# Patient Record
Sex: FEMALE | Race: BLACK OR AFRICAN AMERICAN | NOT HISPANIC OR LATINO
[De-identification: names, ages, dates, MRNs, and addresses within clinical notes are randomized per-mention and may not be internally consistent; named-entity substitution may affect disease eponyms.]

---

## 2017-11-17 PROBLEM — Z00.00 ENCOUNTER FOR PREVENTIVE HEALTH EXAMINATION: Status: ACTIVE | Noted: 2017-11-17

## 2017-12-12 ENCOUNTER — APPOINTMENT (OUTPATIENT)
Dept: PULMONOLOGY | Facility: CLINIC | Age: 82
End: 2017-12-12
Payer: MEDICARE

## 2017-12-12 VITALS
DIASTOLIC BLOOD PRESSURE: 82 MMHG | HEART RATE: 84 BPM | SYSTOLIC BLOOD PRESSURE: 138 MMHG | TEMPERATURE: 97.9 F | HEIGHT: 65 IN | BODY MASS INDEX: 30.82 KG/M2 | WEIGHT: 185 LBS | OXYGEN SATURATION: 90 %

## 2017-12-12 DIAGNOSIS — Z87.39 PERSONAL HISTORY OF OTHER DISEASES OF THE MUSCULOSKELETAL SYSTEM AND CONNECTIVE TISSUE: ICD-10-CM

## 2017-12-12 DIAGNOSIS — J44.9 CHRONIC OBSTRUCTIVE PULMONARY DISEASE, UNSPECIFIED: ICD-10-CM

## 2017-12-12 DIAGNOSIS — Z86.39 PERSONAL HISTORY OF OTHER ENDOCRINE, NUTRITIONAL AND METABOLIC DISEASE: ICD-10-CM

## 2017-12-12 DIAGNOSIS — Z86.79 PERSONAL HISTORY OF OTHER DISEASES OF THE CIRCULATORY SYSTEM: ICD-10-CM

## 2017-12-12 DIAGNOSIS — F17.200 NICOTINE DEPENDENCE, UNSPECIFIED, UNCOMPLICATED: ICD-10-CM

## 2017-12-12 DIAGNOSIS — Z86.69 PERSONAL HISTORY OF OTHER DISEASES OF THE NERVOUS SYSTEM AND SENSE ORGANS: ICD-10-CM

## 2017-12-12 DIAGNOSIS — Z83.3 FAMILY HISTORY OF DIABETES MELLITUS: ICD-10-CM

## 2017-12-12 PROCEDURE — 99204 OFFICE O/P NEW MOD 45 MIN: CPT

## 2017-12-12 RX ORDER — EZETIMIBE AND SIMVASTATIN 10; 80 MG/1; MG/1
TABLET ORAL
Refills: 0 | Status: ACTIVE | COMMUNITY

## 2017-12-12 RX ORDER — TRIAMTERENE AND HYDROCHLOROTHIAZIDE 37.5; 25 MG/1; MG/1
CAPSULE ORAL
Refills: 0 | Status: ACTIVE | COMMUNITY

## 2017-12-12 RX ORDER — TIOTROPIUM BROMIDE 18 UG/1
18 CAPSULE ORAL; RESPIRATORY (INHALATION) DAILY
Qty: 2 | Refills: 3 | Status: ACTIVE | COMMUNITY
Start: 2017-12-12 | End: 1900-01-01

## 2018-05-25 ENCOUNTER — APPOINTMENT (OUTPATIENT)
Dept: ORTHOPEDIC SURGERY | Facility: CLINIC | Age: 83
End: 2018-05-25
Payer: MEDICARE

## 2018-05-25 VITALS — BODY MASS INDEX: 30.99 KG/M2 | WEIGHT: 186 LBS | HEIGHT: 65 IN

## 2018-05-25 DIAGNOSIS — M16.12 UNILATERAL PRIMARY OSTEOARTHRITIS, LEFT HIP: ICD-10-CM

## 2018-05-25 DIAGNOSIS — Z78.9 OTHER SPECIFIED HEALTH STATUS: ICD-10-CM

## 2018-05-25 PROCEDURE — 99203 OFFICE O/P NEW LOW 30 MIN: CPT | Mod: 25

## 2018-05-25 PROCEDURE — 73502 X-RAY EXAM HIP UNI 2-3 VIEWS: CPT | Mod: LT

## 2018-05-25 PROCEDURE — 20611 DRAIN/INJ JOINT/BURSA W/US: CPT | Mod: LT

## 2019-05-14 ENCOUNTER — APPOINTMENT (OUTPATIENT)
Dept: PULMONOLOGY | Facility: CLINIC | Age: 84
End: 2019-05-14
Payer: MEDICARE

## 2019-05-14 VITALS
BODY MASS INDEX: 26.99 KG/M2 | OXYGEN SATURATION: 93 % | TEMPERATURE: 98.1 F | WEIGHT: 162 LBS | SYSTOLIC BLOOD PRESSURE: 116 MMHG | DIASTOLIC BLOOD PRESSURE: 79 MMHG | HEART RATE: 85 BPM | HEIGHT: 65 IN

## 2019-05-14 DIAGNOSIS — R91.8 OTHER NONSPECIFIC ABNORMAL FINDING OF LUNG FIELD: ICD-10-CM

## 2019-05-14 PROCEDURE — 94726 PLETHYSMOGRAPHY LUNG VOLUMES: CPT

## 2019-05-14 PROCEDURE — 99214 OFFICE O/P EST MOD 30 MIN: CPT | Mod: 25

## 2019-05-14 PROCEDURE — ZZZZZ: CPT

## 2019-05-14 PROCEDURE — 94060 EVALUATION OF WHEEZING: CPT

## 2019-05-14 PROCEDURE — 94729 DIFFUSING CAPACITY: CPT

## 2019-05-14 PROCEDURE — 99406 BEHAV CHNG SMOKING 3-10 MIN: CPT

## 2019-05-15 NOTE — PROCEDURE
[FreeTextEntry1] : PFTs- Spirometry shows moderate-severe obstruction with a sig bronchodilator response. Lung volumes show hyperinflation and air trapping. DLCO is severely reduced.\par \par CT chest- Reviewed online [facility] radiology- 2.5 spiculated RML nodule with bronchus sign in lateral segment and 1.4 cm spiculated mass RLL. 2 small < 5mm nodules SANDRA. 1.7cm precarinal node and 1cm right hilar node.\par \par

## 2019-05-15 NOTE — PHYSICAL EXAM
[General Appearance - Well Developed] : well developed [Well Groomed] : well groomed [Normal Appearance] : normal appearance [General Appearance - Well Nourished] : well nourished [No Deformities] : no deformities [General Appearance - In No Acute Distress] : no acute distress [Normal Conjunctiva] : the conjunctiva exhibited no abnormalities [Normal Oropharynx] : normal oropharynx [II] : II [Neck Appearance] : the appearance of the neck was normal [Neck Cervical Mass (___cm)] : no neck mass was observed [Jugular Venous Distention Increased] : there was no jugular-venous distention [Heart Rate And Rhythm] : heart rate and rhythm were normal [Heart Sounds] : normal S1 and S2 [Murmurs] : no murmurs present [Arterial Pulses Normal] : the arterial pulses were normal [Edema] : no peripheral edema present [Abdomen Soft] : soft [Bowel Sounds] : normal bowel sounds [Abdomen Tenderness] : non-tender [Abdomen Mass (___ Cm)] : no abdominal mass palpated [Abnormal Walk] : normal gait [Cyanosis, Localized] : no localized cyanosis [Petechial Hemorrhages (___cm)] : no petechial hemorrhages [Nail Clubbing] : no clubbing of the fingernails [Skin Color & Pigmentation] : normal skin color and pigmentation [Nail Splinter Hemorrhages] : no splinter hemorrhages of the nails [Skin Turgor] : normal skin turgor [No Focal Deficits] : no focal deficits [] : no rash [FreeTextEntry1] : Decreased BS bilaterally

## 2019-05-15 NOTE — ASSESSMENT
[FreeTextEntry1] : 1) Abnormal CT chest- spiculated lung nodules largest 2.5cm with adenopathy. Satellite lesion in RLL\par 2) Moderate/severe COPD\par 3) Active tobacco use

## 2019-05-29 ENCOUNTER — APPOINTMENT (OUTPATIENT)
Dept: THORACIC SURGERY | Facility: CLINIC | Age: 84
End: 2019-05-29
Payer: MEDICARE

## 2019-05-29 VITALS
HEIGHT: 65 IN | SYSTOLIC BLOOD PRESSURE: 142 MMHG | WEIGHT: 163 LBS | OXYGEN SATURATION: 96 % | TEMPERATURE: 97.9 F | HEART RATE: 73 BPM | DIASTOLIC BLOOD PRESSURE: 82 MMHG | BODY MASS INDEX: 27.16 KG/M2

## 2019-05-29 DIAGNOSIS — Z87.09 PERSONAL HISTORY OF OTHER DISEASES OF THE RESPIRATORY SYSTEM: ICD-10-CM

## 2019-05-29 PROCEDURE — 99204 OFFICE O/P NEW MOD 45 MIN: CPT

## 2019-05-29 RX ORDER — LATANOPROST/PF 0.005 %
DROPS OPHTHALMIC (EYE)
Refills: 0 | Status: ACTIVE | COMMUNITY

## 2019-05-29 RX ORDER — TIMOLOL MALEATE 5 MG/ML
0.5 SOLUTION/ DROPS OPHTHALMIC
Refills: 0 | Status: ACTIVE | COMMUNITY

## 2019-05-29 RX ORDER — EZETIMIBE AND SIMVASTATIN 10; 10 MG/1; MG/1
10-10 TABLET ORAL
Refills: 0 | Status: ACTIVE | COMMUNITY

## 2019-05-29 RX ORDER — ASPIRIN 325 MG/1
TABLET, FILM COATED ORAL
Refills: 0 | Status: ACTIVE | COMMUNITY

## 2019-05-29 RX ORDER — TRIAMTERENE AND HYDROCHLOROTHIAZIDE 37.5; 25 MG/1; MG/1
37.5-25 CAPSULE ORAL
Refills: 0 | Status: ACTIVE | COMMUNITY

## 2019-05-29 RX ORDER — IBUPROFEN 800 MG/1
800 TABLET, FILM COATED ORAL
Refills: 0 | Status: ACTIVE | COMMUNITY

## 2019-06-04 ENCOUNTER — OUTPATIENT (OUTPATIENT)
Dept: OUTPATIENT SERVICES | Facility: HOSPITAL | Age: 84
LOS: 1 days | End: 2019-06-04

## 2019-06-04 VITALS
SYSTOLIC BLOOD PRESSURE: 160 MMHG | DIASTOLIC BLOOD PRESSURE: 80 MMHG | HEART RATE: 73 BPM | WEIGHT: 162.04 LBS | RESPIRATION RATE: 16 BRPM | TEMPERATURE: 97 F | HEIGHT: 65 IN | OXYGEN SATURATION: 97 %

## 2019-06-04 DIAGNOSIS — Z86.69 PERSONAL HISTORY OF OTHER DISEASES OF THE NERVOUS SYSTEM AND SENSE ORGANS: Chronic | ICD-10-CM

## 2019-06-04 DIAGNOSIS — R91.8 OTHER NONSPECIFIC ABNORMAL FINDING OF LUNG FIELD: ICD-10-CM

## 2019-06-04 LAB
ANION GAP SERPL CALC-SCNC: 10 MMO/L — SIGNIFICANT CHANGE UP (ref 7–14)
BLD GP AB SCN SERPL QL: NEGATIVE — SIGNIFICANT CHANGE UP
BUN SERPL-MCNC: 11 MG/DL — SIGNIFICANT CHANGE UP (ref 7–23)
CALCIUM SERPL-MCNC: 9.5 MG/DL — SIGNIFICANT CHANGE UP (ref 8.4–10.5)
CHLORIDE SERPL-SCNC: 104 MMOL/L — SIGNIFICANT CHANGE UP (ref 98–107)
CO2 SERPL-SCNC: 30 MMOL/L — SIGNIFICANT CHANGE UP (ref 22–31)
CREAT SERPL-MCNC: 0.74 MG/DL — SIGNIFICANT CHANGE UP (ref 0.5–1.3)
GLUCOSE SERPL-MCNC: 49 MG/DL — LOW (ref 70–99)
HCT VFR BLD CALC: 41.2 % — SIGNIFICANT CHANGE UP (ref 34.5–45)
HGB BLD-MCNC: 13.7 G/DL — SIGNIFICANT CHANGE UP (ref 11.5–15.5)
MCHC RBC-ENTMCNC: 28.2 PG — SIGNIFICANT CHANGE UP (ref 27–34)
MCHC RBC-ENTMCNC: 33.3 % — SIGNIFICANT CHANGE UP (ref 32–36)
MCV RBC AUTO: 84.9 FL — SIGNIFICANT CHANGE UP (ref 80–100)
NRBC # FLD: 0 K/UL — SIGNIFICANT CHANGE UP (ref 0–0)
PLATELET # BLD AUTO: 323 K/UL — SIGNIFICANT CHANGE UP (ref 150–400)
PMV BLD: 11.4 FL — SIGNIFICANT CHANGE UP (ref 7–13)
POTASSIUM SERPL-MCNC: 4.3 MMOL/L — SIGNIFICANT CHANGE UP (ref 3.5–5.3)
POTASSIUM SERPL-SCNC: 4.3 MMOL/L — SIGNIFICANT CHANGE UP (ref 3.5–5.3)
RBC # BLD: 4.85 M/UL — SIGNIFICANT CHANGE UP (ref 3.8–5.2)
RBC # FLD: 14.8 % — HIGH (ref 10.3–14.5)
RH IG SCN BLD-IMP: POSITIVE — SIGNIFICANT CHANGE UP
SODIUM SERPL-SCNC: 144 MMOL/L — SIGNIFICANT CHANGE UP (ref 135–145)
WBC # BLD: 6.67 K/UL — SIGNIFICANT CHANGE UP (ref 3.8–10.5)
WBC # FLD AUTO: 6.67 K/UL — SIGNIFICANT CHANGE UP (ref 3.8–10.5)

## 2019-06-04 RX ORDER — SODIUM CHLORIDE 9 MG/ML
1000 INJECTION, SOLUTION INTRAVENOUS
Refills: 0 | Status: DISCONTINUED | OUTPATIENT
Start: 2019-06-07 | End: 2019-06-22

## 2019-06-04 NOTE — H&P PST ADULT - LYMPHATIC
posterior cervical L/posterior cervical R/supraclavicular L/anterior cervical R/supraclavicular R/anterior cervical L

## 2019-06-04 NOTE — H&P PST ADULT - NSICDXPROBLEM_GEN_ALL_CORE_FT
other nonspecific abnormal finding of lung field.  scheduled flexible bronchoscopy, endobronchial ultrasound with cytology, possible mediastinoscopy, navigational bronchoscopy on 6/7/2019.  preop instructions gi prophylaxis & surgical soap given  Pt returned verbalization of surgical soap instructions with good understanding  abo on admit    Hypertension:  continue medication per routine schedule  elevated b/p in PST.  denies symptomatology  medical eval requested.  Soraya in Dr mukherjee (surgeon) office aware  pending copy of report

## 2019-06-04 NOTE — H&P PST ADULT - NEGATIVE GENERAL GENITOURINARY SYMPTOMS
no bladder infections/no hematuria/no renal colic/no flank pain R/normal urinary frequency/no dysuria/no flank pain L

## 2019-06-04 NOTE — H&P PST ADULT - PAIN CHRONIC, DURATION OF, PROFILE
----- Message from Leena Godoy sent at 10/10/2017 10:00 AM CDT -----  Patient states that the phone number for the class he needs to go to is [phone].   Call him at [phone].                                            ortiz  
Spoke with pt and explained to him that we need the name of the doctor that he is seeing at the [facility] so that Lucy the NP can send a referral with that doctors name on it. Pt said ok he would call us back with that.   
10

## 2019-06-04 NOTE — H&P PST ADULT - HISTORY OF PRESENT ILLNESS
84y/o female presents for preop eval for scheduled flexible bronchoscopy, endobronchial ultrasound with cytology, possible mediastinoscopy, navigational bronchoscopy on 6/7/2019.  Pt states abnormal finding in annual routine cxr.  Preop dx other nonspecific abnormal finding of lung field.

## 2019-06-04 NOTE — H&P PST ADULT - NSANTHOSAYNRD_GEN_A_CORE
No. YONAS screening performed.  STOP BANG Legend: 0-2 = LOW Risk; 3-4 = INTERMEDIATE Risk; 5-8 = HIGH Risk

## 2019-06-04 NOTE — H&P PST ADULT - NSICDXPASTMEDICALHX_GEN_ALL_CORE_FT
PAST MEDICAL HISTORY:  Glaucoma     Hyperlipidemia     Hypertension     Other nonspecific abnormal finding of lung field

## 2019-06-05 NOTE — PHYSICAL EXAM
[General Appearance - Alert] : alert [General Appearance - In No Acute Distress] : in no acute distress [Extraocular Movements] : extraocular movements were intact [Sclera] : the sclera and conjunctiva were normal [Outer Ear] : the ears and nose were normal in appearance [Neck Appearance] : the appearance of the neck was normal [] : the neck was supple [Auscultation Breath Sounds / Voice Sounds] : lungs were clear to auscultation bilaterally [Heart Sounds] : normal S1 and S2 [2+] : left 2+ [Abdomen Soft] : soft [Abdomen Tenderness] : non-tender [Cervical Lymph Nodes Enlarged Anterior Bilaterally] : anterior cervical [Cervical Lymph Nodes Enlarged Posterior Bilaterally] : posterior cervical [Supraclavicular Lymph Nodes Enlarged Bilaterally] : supraclavicular [No CVA Tenderness] : no ~M costovertebral angle tenderness [Abnormal Walk] : normal gait [Skin Color & Pigmentation] : normal skin color and pigmentation [No Focal Deficits] : no focal deficits [Oriented To Time, Place, And Person] : oriented to person, place, and time [Fingers] :  capillary refill of the fingers was normal [Left Fingers] :  capillary refill of the left fingers was normal [FreeTextEntry1] : walks with cane

## 2019-06-05 NOTE — ADDENDUM
[FreeTextEntry1] : CD's obtained from [facility] - will plan tentatively for 6/7 for flex bronch, ebus possible med. possible stephany bronch. \par patient will require medical clearance . discussed with patient and PCP's office

## 2019-06-05 NOTE — REVIEW OF SYSTEMS
[SOB on Exertion] : shortness of breath during exertion [Negative] : Heme/Lymph [Fever] : no fever [Chills] : no chills [Shortness Of Breath] : no shortness of breath [Chest Pain] : no chest pain [FreeTextEntry6] : She says she doesn't exert herself so is not sure if she gets SOB with exertion but knows she has COPD [Abdominal Pain] : no abdominal pain

## 2019-06-05 NOTE — DATA REVIEWED
[FreeTextEntry1] : PFTs- Spirometry shows moderate-severe obstruction with a sig bronchodilator response. Lung volumes show hyperinflation and air trapping. DLCO is severely reduced.\par \par CT chest- Reviewed online [facility] radiology- 2.5 spiculated RML nodule with bronchus sign in lateral segment and 1.4 cm spiculated mass RLL. 2 small < 5mm nodules SANDRA. 1.7cm precarinal node and 1cm right hilar node.\par \par \par \par CT chest without contrast  done at  radiology on 4/10/19.  Full report is scanned into chart.\par Impression:\par 1. Right lung nodules suspicious for neoplasm.\par 2.  Mediastinal lymphadenopathy.\par 3.  Emphysema\par \par PET/CT done at  radiology 5/21/19  Full report scanned into chart\par Impression.\par 1. Hypermetabolic RML pulmonary mass, which is most c/w primary bronchogenic malignancy.\par 2.  Additional hypermetabolic right lower lobe pulmonary nodule, which is most consistent with malignancy.\par 3. Hypermetabolic borderline enlarged precarinal lymph node.  These findings are nonspecific, but malignancy cannot be excluded.\par \par \par

## 2019-06-07 ENCOUNTER — RESULT REVIEW (OUTPATIENT)
Age: 84
End: 2019-06-07

## 2019-06-07 ENCOUNTER — OUTPATIENT (OUTPATIENT)
Dept: OUTPATIENT SERVICES | Facility: HOSPITAL | Age: 84
LOS: 1 days | Discharge: HOME | End: 2019-06-07
Payer: MEDICARE

## 2019-06-07 ENCOUNTER — APPOINTMENT (OUTPATIENT)
Dept: THORACIC SURGERY | Facility: HOSPITAL | Age: 84
End: 2019-06-07

## 2019-06-07 VITALS
SYSTOLIC BLOOD PRESSURE: 115 MMHG | DIASTOLIC BLOOD PRESSURE: 74 MMHG | HEART RATE: 94 BPM | RESPIRATION RATE: 14 BRPM | OXYGEN SATURATION: 95 %

## 2019-06-07 VITALS
DIASTOLIC BLOOD PRESSURE: 69 MMHG | WEIGHT: 162.04 LBS | SYSTOLIC BLOOD PRESSURE: 117 MMHG | OXYGEN SATURATION: 100 % | HEART RATE: 85 BPM | RESPIRATION RATE: 18 BRPM | HEIGHT: 65 IN | TEMPERATURE: 98 F

## 2019-06-07 DIAGNOSIS — R91.8 OTHER NONSPECIFIC ABNORMAL FINDING OF LUNG FIELD: ICD-10-CM

## 2019-06-07 DIAGNOSIS — Z86.69 PERSONAL HISTORY OF OTHER DISEASES OF THE NERVOUS SYSTEM AND SENSE ORGANS: Chronic | ICD-10-CM

## 2019-06-07 LAB
GRAM STN SPT: SIGNIFICANT CHANGE UP
RH IG SCN BLD-IMP: POSITIVE — SIGNIFICANT CHANGE UP
SPECIMEN SOURCE: SIGNIFICANT CHANGE UP

## 2019-06-07 PROCEDURE — 31627 NAVIGATIONAL BRONCHOSCOPY: CPT

## 2019-06-07 PROCEDURE — 31652 BRONCH EBUS SAMPLNG 1/2 NODE: CPT

## 2019-06-07 PROCEDURE — 88173 CYTOPATH EVAL FNA REPORT: CPT | Mod: 26

## 2019-06-07 PROCEDURE — 88112 CYTOPATH CELL ENHANCE TECH: CPT | Mod: 26,59

## 2019-06-07 PROCEDURE — 88331 PATH CONSLTJ SURG 1 BLK 1SPC: CPT | Mod: 26

## 2019-06-07 PROCEDURE — 88305 TISSUE EXAM BY PATHOLOGIST: CPT | Mod: 26

## 2019-06-07 PROCEDURE — 31624 DX BRONCHOSCOPE/LAVAGE: CPT

## 2019-06-07 PROCEDURE — 88341 IMHCHEM/IMCYTCHM EA ADD ANTB: CPT | Mod: 26,59

## 2019-06-07 PROCEDURE — 31645 BRNCHSC W/THER ASPIR 1ST: CPT

## 2019-06-07 PROCEDURE — 88172 CYTP DX EVAL FNA 1ST EA SITE: CPT | Mod: 26

## 2019-06-07 PROCEDURE — 88305 TISSUE EXAM BY PATHOLOGIST: CPT | Mod: 26,59

## 2019-06-07 PROCEDURE — 88360 TUMOR IMMUNOHISTOCHEM/MANUAL: CPT | Mod: 26

## 2019-06-07 PROCEDURE — 88342 IMHCHEM/IMCYTCHM 1ST ANTB: CPT | Mod: 26,59

## 2019-06-07 PROCEDURE — 71045 X-RAY EXAM CHEST 1 VIEW: CPT | Mod: 26

## 2019-06-07 RX ADMIN — SODIUM CHLORIDE 30 MILLILITER(S): 9 INJECTION, SOLUTION INTRAVENOUS at 13:26

## 2019-06-07 NOTE — ASU DISCHARGE PLAN (ADULT/PEDIATRIC) - CALL YOUR DOCTOR IF YOU HAVE ANY OF THE FOLLOWING:
Bleeding that does not stop/SOB Bleeding that does not stop/shortness of breath, it is normal to cough up small amounts of blood tinged sputum shortness of breath, it is normal to cough up small amounts of blood tinged sputum/Nausea and vomiting that does not stop/Pain not relieved by Medications/Bleeding that does not stop/Fever greater than (need to indicate Fahrenheit or Celsius)

## 2019-06-07 NOTE — BRIEF OPERATIVE NOTE - OPERATION/FINDINGS
FB, Navigational bronchoscopy, FNA, EBUS, TBNA FB, Navigational bronchoscopy, FNA, EBUS, TBNA, RML bx suspicious for neoplasm

## 2019-06-07 NOTE — BRIEF OPERATIVE NOTE - NSICDXBRIEFPROCEDURE_GEN_ALL_CORE_FT
PROCEDURES:  Navigational bronchoscopy 07-Jun-2019 16:48:31  Henry Mullen PROCEDURES:  Intraoperative endobronchial ultrasound 07-Jun-2019 17:26:34 TBNA level R4 and precarinal LN's Antonella Galeano  Bronchoscopy, flexible, with bronchial washing 07-Jun-2019 17:26:08  Antonella Galeano  Navigational bronchoscopy 07-Jun-2019 16:48:31 RLL, RML bx Henry Mullen

## 2019-06-07 NOTE — ASU DISCHARGE PLAN (ADULT/PEDIATRIC) - CARE PROVIDER_API CALL
Margie Holguin)  Surgery; Thoracic Surgery  [address] [place] [address]  Phone: [phone]  Fax: [phone]  Follow Up Time:

## 2019-06-08 LAB
CULTURE - ACID FAST SMEAR CONCENTRATED: SIGNIFICANT CHANGE UP
SPECIMEN SOURCE: SIGNIFICANT CHANGE UP

## 2019-06-10 LAB — BACTERIA SPT RESP CULT: SIGNIFICANT CHANGE UP

## 2019-06-11 PROBLEM — E78.5 HYPERLIPIDEMIA, UNSPECIFIED: Chronic | Status: ACTIVE | Noted: 2019-06-04

## 2019-06-11 PROBLEM — H40.9 UNSPECIFIED GLAUCOMA: Chronic | Status: ACTIVE | Noted: 2019-06-04

## 2019-06-11 PROBLEM — I10 ESSENTIAL (PRIMARY) HYPERTENSION: Chronic | Status: ACTIVE | Noted: 2019-06-04

## 2019-06-11 PROBLEM — R91.8 OTHER NONSPECIFIC ABNORMAL FINDING OF LUNG FIELD: Chronic | Status: ACTIVE | Noted: 2019-06-04

## 2019-06-11 LAB — SPECIMEN SOURCE: SIGNIFICANT CHANGE UP

## 2019-06-14 LAB — SPECIMEN SOURCE: SIGNIFICANT CHANGE UP

## 2019-06-19 ENCOUNTER — APPOINTMENT (OUTPATIENT)
Dept: THORACIC SURGERY | Facility: CLINIC | Age: 84
End: 2019-06-19
Payer: MEDICARE

## 2019-06-19 VITALS
HEIGHT: 65 IN | BODY MASS INDEX: 27.49 KG/M2 | SYSTOLIC BLOOD PRESSURE: 131 MMHG | TEMPERATURE: 98.5 F | OXYGEN SATURATION: 95 % | HEART RATE: 69 BPM | DIASTOLIC BLOOD PRESSURE: 85 MMHG | WEIGHT: 165 LBS

## 2019-06-19 DIAGNOSIS — C34.90 MALIGNANT NEOPLASM OF UNSPECIFIED PART OF UNSPECIFIED BRONCHUS OR LUNG: ICD-10-CM

## 2019-06-19 PROCEDURE — 99215 OFFICE O/P EST HI 40 MIN: CPT

## 2019-06-19 NOTE — PHYSICAL EXAM
[Extraocular Movements] : extraocular movements were intact [Sclera] : the sclera and conjunctiva were normal [Auscultation Breath Sounds / Voice Sounds] : lungs were clear to auscultation bilaterally [Exaggerated Use Of Accessory Muscles For Inspiration] : no accessory muscle use [] : no respiratory distress [Heart Sounds] : normal S1 and S2 [Abdomen Soft] : soft [Examination Of The Chest] : the chest was normal in appearance [Abdomen Tenderness] : non-tender [No Focal Deficits] : no focal deficits [Skin Color & Pigmentation] : normal skin color and pigmentation [Oriented To Time, Place, And Person] : oriented to person, place, and time

## 2019-06-21 NOTE — DATA REVIEWED
[FreeTextEntry1] : Pathology:  RML cytopathology positive for malignant cells.  Squamous cell carcinoma.

## 2019-06-21 NOTE — REVIEW OF SYSTEMS
[Negative] : Heme/Lymph [Fever] : no fever [Chills] : no chills [Shortness Of Breath] : no shortness of breath [Chest Pain] : no chest pain [SOB on Exertion] : no shortness of breath during exertion [Cough] : no cough

## 2019-06-21 NOTE — HISTORY OF PRESENT ILLNESS
[FreeTextEntry1] : 84 y/o female seen here 3 weeks ago for evaluation of RML and RLL nodules as well  mediastinal lymphadenopathy suspicious for malignancy. She is now s/p navigational bronchoscopy and EBUS done 6/7/19 to diagnose these findings.  She is here for follow up and review of path.  She denies pain, denies dyspnea, denies hemoptysis, denies fever and/or chills. She continues to smoke.\par \par

## 2019-06-21 NOTE — ASSESSMENT
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  PDL1 positive, 95%.   Will refer to oncology (Dr. Kadi Eldridge) for evaluation and treatment.  Also encouraged patient to quit smoking. \par RTO prn.\par \par Written by  Ana Paula Damian PA-C acting as a scribe for Naty Holguin MD. The documentation recorded by the scribe accurately reflects the service I personally performed and the decisions made by me, NATY HOLGUIN MD.\par

## 2019-07-03 LAB — FUNGUS SPEC QL CULT: SIGNIFICANT CHANGE UP

## 2019-07-19 LAB — ACID FAST STN SPEC: SIGNIFICANT CHANGE UP

## 2019-07-30 ENCOUNTER — NON-APPOINTMENT (OUTPATIENT)
Age: 84
End: 2019-07-30

## 2019-07-30 ENCOUNTER — APPOINTMENT (OUTPATIENT)
Dept: OPHTHALMOLOGY | Facility: CLINIC | Age: 84
End: 2019-07-30
Payer: MEDICARE

## 2019-07-30 PROCEDURE — 92004 COMPRE OPH EXAM NEW PT 1/>: CPT

## 2020-08-10 ENCOUNTER — NON-APPOINTMENT (OUTPATIENT)
Age: 85
End: 2020-08-10

## 2020-08-10 ENCOUNTER — APPOINTMENT (OUTPATIENT)
Dept: OPHTHALMOLOGY | Facility: CLINIC | Age: 85
End: 2020-08-10
Payer: MEDICARE

## 2020-08-10 PROCEDURE — 92020 GONIOSCOPY: CPT

## 2020-08-10 PROCEDURE — 92014 COMPRE OPH EXAM EST PT 1/>: CPT

## 2020-08-10 PROCEDURE — 92015 DETERMINE REFRACTIVE STATE: CPT

## 2020-08-10 PROCEDURE — 92250 FUNDUS PHOTOGRAPHY W/I&R: CPT

## 2020-09-09 ENCOUNTER — APPOINTMENT (OUTPATIENT)
Dept: OPHTHALMOLOGY | Facility: CLINIC | Age: 85
End: 2020-09-09

## 2020-11-09 ENCOUNTER — APPOINTMENT (OUTPATIENT)
Dept: OPHTHALMOLOGY | Facility: CLINIC | Age: 85
End: 2020-11-09
Payer: MEDICARE

## 2020-11-09 ENCOUNTER — NON-APPOINTMENT (OUTPATIENT)
Age: 85
End: 2020-11-09

## 2020-11-09 PROCEDURE — 92133 CPTRZD OPH DX IMG PST SGM ON: CPT

## 2020-11-09 PROCEDURE — 76514 ECHO EXAM OF EYE THICKNESS: CPT

## 2020-11-09 PROCEDURE — 92083 EXTENDED VISUAL FIELD XM: CPT

## 2020-11-09 PROCEDURE — 92012 INTRM OPH EXAM EST PATIENT: CPT

## 2020-12-23 ENCOUNTER — APPOINTMENT (OUTPATIENT)
Dept: PHYSICAL MEDICINE AND REHAB | Facility: CLINIC | Age: 85
End: 2020-12-23
Payer: MEDICARE

## 2020-12-23 DIAGNOSIS — M54.10 RADICULOPATHY, SITE UNSPECIFIED: ICD-10-CM

## 2020-12-23 DIAGNOSIS — M79.2 NEURALGIA AND NEURITIS, UNSPECIFIED: ICD-10-CM

## 2020-12-23 DIAGNOSIS — M54.5 LOW BACK PAIN: ICD-10-CM

## 2020-12-23 PROCEDURE — 99204 OFFICE O/P NEW MOD 45 MIN: CPT

## 2020-12-23 RX ORDER — GABAPENTIN 300 MG/1
300 CAPSULE ORAL
Qty: 60 | Refills: 1 | Status: ACTIVE | COMMUNITY
Start: 2020-12-23 | End: 1900-01-01

## 2020-12-23 NOTE — PHYSICAL EXAM
[FreeTextEntry1] : Gen: Patient is A&O x 3, NAD\par HEENT: EOMI, hearing grossly normal\par Resp: regular, non - labored\par CV: pulses regular\par Skin: no rashes, erythema\par Lymph: no clubbing, cyanosis, edema, or palpable lymphadenopathy\par Inspection: no instability or misalignment\par ROM: Pain with full lumbar extension, no pain with hip internal/external rotation\par Palpation: TTP right PSIS and piriformis\par Sensation: intact to light touch\par Reflexes: 1+ and symmetric throughout\par Strength: 4/5 throughout, except 3/5 in right hip flexion\par Special tests: -Stinchfield, -straight leg raise, +Femoral nerve stretch test, +JORDY \par Gait: antalgic\par \par

## 2020-12-23 NOTE — ASSESSMENT
[FreeTextEntry1] : 85-year-old female with history of lung cancer presenting for initial evaluation of back pain.\par \par #Low back pain\par -Appears multifactorial in origin.  She is demonstrating a radicular component consistent with the L3 dermatome.  She also is demonstrating sacroiliac joint pain.  There is also concern for possible metastatic component.  Patient is awaiting for PET scan.  Low suspicion for intrinsic hip pathology as a primary pain generator.  \par -Start gabapentin 300 mg twice a day for neuropathic pain.\par -Discussed with patient that she may benefit from a physical therapy protocol to improve her core strengthening as well as hamstring and quadratus stretching and to improve her functional strength and mobility.\par -Patient will follow up after completion of PET scan.  Could consider epidural injection at that time.\par \par Follow-up in 1 month.

## 2021-01-27 ENCOUNTER — APPOINTMENT (OUTPATIENT)
Dept: PHYSICAL MEDICINE AND REHAB | Facility: CLINIC | Age: 86
End: 2021-01-27

## 2021-03-09 ENCOUNTER — APPOINTMENT (OUTPATIENT)
Dept: OPHTHALMOLOGY | Facility: CLINIC | Age: 86
End: 2021-03-09

## 2021-07-13 ENCOUNTER — NON-APPOINTMENT (OUTPATIENT)
Age: 86
End: 2021-07-13

## 2021-07-13 ENCOUNTER — APPOINTMENT (OUTPATIENT)
Dept: OPHTHALMOLOGY | Facility: CLINIC | Age: 86
End: 2021-07-13
Payer: MEDICARE

## 2021-07-13 PROCEDURE — 92012 INTRM OPH EXAM EST PATIENT: CPT

## 2022-01-31 ENCOUNTER — APPOINTMENT (OUTPATIENT)
Dept: OPHTHALMOLOGY | Facility: CLINIC | Age: 87
End: 2022-01-31

## 2023-01-01 ENCOUNTER — RESULT REVIEW (OUTPATIENT)
Age: 88
End: 2023-01-01

## 2023-01-01 ENCOUNTER — TRANSCRIPTION ENCOUNTER (OUTPATIENT)
Age: 88
End: 2023-01-01

## 2023-01-01 ENCOUNTER — INPATIENT (INPATIENT)
Facility: HOSPITAL | Age: 88
LOS: 10 days | Discharge: NURSING, REHAB OR LONG-TERM CARE | DRG: 64 | End: 2023-11-07
Attending: INTERNAL MEDICINE | Admitting: INTERNAL MEDICINE
Payer: MEDICARE

## 2023-01-01 VITALS
TEMPERATURE: 98 F | SYSTOLIC BLOOD PRESSURE: 135 MMHG | HEART RATE: 67 BPM | DIASTOLIC BLOOD PRESSURE: 79 MMHG | RESPIRATION RATE: 17 BRPM | OXYGEN SATURATION: 94 %

## 2023-01-01 VITALS
SYSTOLIC BLOOD PRESSURE: 121 MMHG | OXYGEN SATURATION: 94 % | HEIGHT: 67 IN | TEMPERATURE: 98 F | WEIGHT: 165.79 LBS | DIASTOLIC BLOOD PRESSURE: 85 MMHG | RESPIRATION RATE: 20 BRPM | HEART RATE: 114 BPM

## 2023-01-01 DIAGNOSIS — Z86.69 PERSONAL HISTORY OF OTHER DISEASES OF THE NERVOUS SYSTEM AND SENSE ORGANS: Chronic | ICD-10-CM

## 2023-01-01 DIAGNOSIS — N39.0 URINARY TRACT INFECTION, SITE NOT SPECIFIED: ICD-10-CM

## 2023-01-01 DIAGNOSIS — I24.89 OTHER FORMS OF ACUTE ISCHEMIC HEART DISEASE: ICD-10-CM

## 2023-01-01 DIAGNOSIS — I21.4 NON-ST ELEVATION (NSTEMI) MYOCARDIAL INFARCTION: ICD-10-CM

## 2023-01-01 DIAGNOSIS — I63.9 CEREBRAL INFARCTION, UNSPECIFIED: ICD-10-CM

## 2023-01-01 DIAGNOSIS — Z29.9 ENCOUNTER FOR PROPHYLACTIC MEASURES, UNSPECIFIED: ICD-10-CM

## 2023-01-01 DIAGNOSIS — B37.81 CANDIDAL ESOPHAGITIS: ICD-10-CM

## 2023-01-01 DIAGNOSIS — Z02.9 ENCOUNTER FOR ADMINISTRATIVE EXAMINATIONS, UNSPECIFIED: ICD-10-CM

## 2023-01-01 DIAGNOSIS — I10 ESSENTIAL (PRIMARY) HYPERTENSION: ICD-10-CM

## 2023-01-01 DIAGNOSIS — R41.82 ALTERED MENTAL STATUS, UNSPECIFIED: ICD-10-CM

## 2023-01-01 DIAGNOSIS — I48.20 CHRONIC ATRIAL FIBRILLATION, UNSPECIFIED: ICD-10-CM

## 2023-01-01 DIAGNOSIS — J96.01 ACUTE RESPIRATORY FAILURE WITH HYPOXIA: ICD-10-CM

## 2023-01-01 DIAGNOSIS — R13.10 DYSPHAGIA, UNSPECIFIED: ICD-10-CM

## 2023-01-01 LAB
-  AMIKACIN: SIGNIFICANT CHANGE UP
-  AMIKACIN: SIGNIFICANT CHANGE UP
-  AMOXICILLIN/CLAVULANIC ACID: SIGNIFICANT CHANGE UP
-  AMOXICILLIN/CLAVULANIC ACID: SIGNIFICANT CHANGE UP
-  AMPICILLIN/SULBACTAM: SIGNIFICANT CHANGE UP
-  AMPICILLIN/SULBACTAM: SIGNIFICANT CHANGE UP
-  AMPICILLIN: SIGNIFICANT CHANGE UP
-  AMPICILLIN: SIGNIFICANT CHANGE UP
-  AZTREONAM: SIGNIFICANT CHANGE UP
-  AZTREONAM: SIGNIFICANT CHANGE UP
-  CEFAZOLIN: SIGNIFICANT CHANGE UP
-  CEFAZOLIN: SIGNIFICANT CHANGE UP
-  CEFEPIME: SIGNIFICANT CHANGE UP
-  CEFEPIME: SIGNIFICANT CHANGE UP
-  CEFOXITIN: SIGNIFICANT CHANGE UP
-  CEFOXITIN: SIGNIFICANT CHANGE UP
-  CEFTRIAXONE: SIGNIFICANT CHANGE UP
-  CEFTRIAXONE: SIGNIFICANT CHANGE UP
-  CEFUROXIME: SIGNIFICANT CHANGE UP
-  CEFUROXIME: SIGNIFICANT CHANGE UP
-  CIPROFLOXACIN: SIGNIFICANT CHANGE UP
-  CIPROFLOXACIN: SIGNIFICANT CHANGE UP
-  ERTAPENEM: SIGNIFICANT CHANGE UP
-  ERTAPENEM: SIGNIFICANT CHANGE UP
-  GENTAMICIN: SIGNIFICANT CHANGE UP
-  GENTAMICIN: SIGNIFICANT CHANGE UP
-  IMIPENEM: SIGNIFICANT CHANGE UP
-  IMIPENEM: SIGNIFICANT CHANGE UP
-  LEVOFLOXACIN: SIGNIFICANT CHANGE UP
-  LEVOFLOXACIN: SIGNIFICANT CHANGE UP
-  MEROPENEM: SIGNIFICANT CHANGE UP
-  MEROPENEM: SIGNIFICANT CHANGE UP
-  NITROFURANTOIN: SIGNIFICANT CHANGE UP
-  NITROFURANTOIN: SIGNIFICANT CHANGE UP
-  PIPERACILLIN/TAZOBACTAM: SIGNIFICANT CHANGE UP
-  PIPERACILLIN/TAZOBACTAM: SIGNIFICANT CHANGE UP
-  TOBRAMYCIN: SIGNIFICANT CHANGE UP
-  TOBRAMYCIN: SIGNIFICANT CHANGE UP
-  TRIMETHOPRIM/SULFAMETHOXAZOLE: SIGNIFICANT CHANGE UP
-  TRIMETHOPRIM/SULFAMETHOXAZOLE: SIGNIFICANT CHANGE UP
24R-OH-CALCIDIOL SERPL-MCNC: 13.4 NG/ML — LOW (ref 30–80)
24R-OH-CALCIDIOL SERPL-MCNC: 13.4 NG/ML — LOW (ref 30–80)
A1C WITH ESTIMATED AVERAGE GLUCOSE RESULT: 5.7 % — HIGH (ref 4–5.6)
A1C WITH ESTIMATED AVERAGE GLUCOSE RESULT: 5.7 % — HIGH (ref 4–5.6)
ALBUMIN SERPL ELPH-MCNC: 2.4 G/DL — LOW (ref 3.5–5)
ALBUMIN SERPL ELPH-MCNC: 2.5 G/DL — LOW (ref 3.5–5)
ALBUMIN SERPL ELPH-MCNC: 2.6 G/DL — LOW (ref 3.5–5)
ALBUMIN SERPL ELPH-MCNC: 2.6 G/DL — LOW (ref 3.5–5)
ALBUMIN SERPL ELPH-MCNC: 2.7 G/DL — LOW (ref 3.5–5)
ALBUMIN SERPL ELPH-MCNC: 2.7 G/DL — LOW (ref 3.5–5)
ALBUMIN SERPL ELPH-MCNC: 2.9 G/DL — LOW (ref 3.5–5)
ALBUMIN SERPL ELPH-MCNC: 2.9 G/DL — LOW (ref 3.5–5)
ALBUMIN SERPL ELPH-MCNC: 3.1 G/DL — LOW (ref 3.5–5)
ALBUMIN SERPL ELPH-MCNC: 3.1 G/DL — LOW (ref 3.5–5)
ALP SERPL-CCNC: 170 U/L — HIGH (ref 40–120)
ALP SERPL-CCNC: 170 U/L — HIGH (ref 40–120)
ALP SERPL-CCNC: 171 U/L — HIGH (ref 40–120)
ALP SERPL-CCNC: 172 U/L — HIGH (ref 40–120)
ALP SERPL-CCNC: 172 U/L — HIGH (ref 40–120)
ALP SERPL-CCNC: 177 U/L — HIGH (ref 40–120)
ALP SERPL-CCNC: 177 U/L — HIGH (ref 40–120)
ALP SERPL-CCNC: 181 U/L — HIGH (ref 40–120)
ALP SERPL-CCNC: 181 U/L — HIGH (ref 40–120)
ALP SERPL-CCNC: 193 U/L — HIGH (ref 40–120)
ALP SERPL-CCNC: 193 U/L — HIGH (ref 40–120)
ALP SERPL-CCNC: 195 U/L — HIGH (ref 40–120)
ALP SERPL-CCNC: 195 U/L — HIGH (ref 40–120)
ALP SERPL-CCNC: 196 U/L — HIGH (ref 40–120)
ALP SERPL-CCNC: 196 U/L — HIGH (ref 40–120)
ALT FLD-CCNC: 17 U/L DA — SIGNIFICANT CHANGE UP (ref 10–60)
ALT FLD-CCNC: 17 U/L DA — SIGNIFICANT CHANGE UP (ref 10–60)
ALT FLD-CCNC: 18 U/L DA — SIGNIFICANT CHANGE UP (ref 10–60)
ALT FLD-CCNC: 19 U/L DA — SIGNIFICANT CHANGE UP (ref 10–60)
ALT FLD-CCNC: 19 U/L DA — SIGNIFICANT CHANGE UP (ref 10–60)
ALT FLD-CCNC: 21 U/L DA — SIGNIFICANT CHANGE UP (ref 10–60)
ALT FLD-CCNC: 21 U/L DA — SIGNIFICANT CHANGE UP (ref 10–60)
ALT FLD-CCNC: 23 U/L DA — SIGNIFICANT CHANGE UP (ref 10–60)
ANION GAP SERPL CALC-SCNC: 3 MMOL/L — LOW (ref 5–17)
ANION GAP SERPL CALC-SCNC: 3 MMOL/L — LOW (ref 5–17)
ANION GAP SERPL CALC-SCNC: 4 MMOL/L — LOW (ref 5–17)
ANION GAP SERPL CALC-SCNC: 4 MMOL/L — LOW (ref 5–17)
ANION GAP SERPL CALC-SCNC: 5 MMOL/L — SIGNIFICANT CHANGE UP (ref 5–17)
ANION GAP SERPL CALC-SCNC: 5 MMOL/L — SIGNIFICANT CHANGE UP (ref 5–17)
ANION GAP SERPL CALC-SCNC: 6 MMOL/L — SIGNIFICANT CHANGE UP (ref 5–17)
ANION GAP SERPL CALC-SCNC: 7 MMOL/L — SIGNIFICANT CHANGE UP (ref 5–17)
ANION GAP SERPL CALC-SCNC: 7 MMOL/L — SIGNIFICANT CHANGE UP (ref 5–17)
ANION GAP SERPL CALC-SCNC: 8 MMOL/L — SIGNIFICANT CHANGE UP (ref 5–17)
ANION GAP SERPL CALC-SCNC: 9 MMOL/L — SIGNIFICANT CHANGE UP (ref 5–17)
ANION GAP SERPL CALC-SCNC: 9 MMOL/L — SIGNIFICANT CHANGE UP (ref 5–17)
ANISOCYTOSIS BLD QL: SLIGHT — SIGNIFICANT CHANGE UP
ANISOCYTOSIS BLD QL: SLIGHT — SIGNIFICANT CHANGE UP
APPEARANCE UR: ABNORMAL
APPEARANCE UR: ABNORMAL
APTT BLD: 41.7 SEC — HIGH (ref 24.5–35.6)
APTT BLD: 41.7 SEC — HIGH (ref 24.5–35.6)
AST SERPL-CCNC: 13 U/L — SIGNIFICANT CHANGE UP (ref 10–40)
AST SERPL-CCNC: 13 U/L — SIGNIFICANT CHANGE UP (ref 10–40)
AST SERPL-CCNC: 15 U/L — SIGNIFICANT CHANGE UP (ref 10–40)
AST SERPL-CCNC: 15 U/L — SIGNIFICANT CHANGE UP (ref 10–40)
AST SERPL-CCNC: 16 U/L — SIGNIFICANT CHANGE UP (ref 10–40)
AST SERPL-CCNC: 19 U/L — SIGNIFICANT CHANGE UP (ref 10–40)
AST SERPL-CCNC: 19 U/L — SIGNIFICANT CHANGE UP (ref 10–40)
AST SERPL-CCNC: 21 U/L — SIGNIFICANT CHANGE UP (ref 10–40)
AST SERPL-CCNC: 21 U/L — SIGNIFICANT CHANGE UP (ref 10–40)
AST SERPL-CCNC: 22 U/L — SIGNIFICANT CHANGE UP (ref 10–40)
AST SERPL-CCNC: 22 U/L — SIGNIFICANT CHANGE UP (ref 10–40)
AST SERPL-CCNC: 25 U/L — SIGNIFICANT CHANGE UP (ref 10–40)
BASOPHILS # BLD AUTO: 0.02 K/UL — SIGNIFICANT CHANGE UP (ref 0–0.2)
BASOPHILS # BLD AUTO: 0.03 K/UL — SIGNIFICANT CHANGE UP (ref 0–0.2)
BASOPHILS # BLD AUTO: 0.04 K/UL — SIGNIFICANT CHANGE UP (ref 0–0.2)
BASOPHILS NFR BLD AUTO: 0.2 % — SIGNIFICANT CHANGE UP (ref 0–2)
BASOPHILS NFR BLD AUTO: 0.2 % — SIGNIFICANT CHANGE UP (ref 0–2)
BASOPHILS NFR BLD AUTO: 0.3 % — SIGNIFICANT CHANGE UP (ref 0–2)
BASOPHILS NFR BLD AUTO: 0.4 % — SIGNIFICANT CHANGE UP (ref 0–2)
BASOPHILS NFR BLD AUTO: 0.5 % — SIGNIFICANT CHANGE UP (ref 0–2)
BASOPHILS NFR BLD AUTO: 0.5 % — SIGNIFICANT CHANGE UP (ref 0–2)
BASOPHILS NFR BLD AUTO: 0.6 % — SIGNIFICANT CHANGE UP (ref 0–2)
BASOPHILS NFR BLD AUTO: 0.6 % — SIGNIFICANT CHANGE UP (ref 0–2)
BASOPHILS NFR BLD AUTO: 0.7 % — SIGNIFICANT CHANGE UP (ref 0–2)
BASOPHILS NFR BLD AUTO: 0.7 % — SIGNIFICANT CHANGE UP (ref 0–2)
BASOPHILS NFR BLD AUTO: 0.8 % — SIGNIFICANT CHANGE UP (ref 0–2)
BILIRUB SERPL-MCNC: 0.5 MG/DL — SIGNIFICANT CHANGE UP (ref 0.2–1.2)
BILIRUB SERPL-MCNC: 0.6 MG/DL — SIGNIFICANT CHANGE UP (ref 0.2–1.2)
BILIRUB SERPL-MCNC: 0.7 MG/DL — SIGNIFICANT CHANGE UP (ref 0.2–1.2)
BILIRUB SERPL-MCNC: 1.1 MG/DL — SIGNIFICANT CHANGE UP (ref 0.2–1.2)
BILIRUB SERPL-MCNC: 1.1 MG/DL — SIGNIFICANT CHANGE UP (ref 0.2–1.2)
BILIRUB UR-MCNC: ABNORMAL
BILIRUB UR-MCNC: ABNORMAL
BUN SERPL-MCNC: 10 MG/DL — SIGNIFICANT CHANGE UP (ref 7–18)
BUN SERPL-MCNC: 11 MG/DL — SIGNIFICANT CHANGE UP (ref 7–18)
BUN SERPL-MCNC: 14 MG/DL — SIGNIFICANT CHANGE UP (ref 7–18)
BUN SERPL-MCNC: 14 MG/DL — SIGNIFICANT CHANGE UP (ref 7–18)
BUN SERPL-MCNC: 7 MG/DL — SIGNIFICANT CHANGE UP (ref 7–18)
BUN SERPL-MCNC: 7 MG/DL — SIGNIFICANT CHANGE UP (ref 7–18)
BUN SERPL-MCNC: 8 MG/DL — SIGNIFICANT CHANGE UP (ref 7–18)
BUN SERPL-MCNC: 8 MG/DL — SIGNIFICANT CHANGE UP (ref 7–18)
BUN SERPL-MCNC: 9 MG/DL — SIGNIFICANT CHANGE UP (ref 7–18)
CALCIUM SERPL-MCNC: 7.9 MG/DL — LOW (ref 8.4–10.5)
CALCIUM SERPL-MCNC: 7.9 MG/DL — LOW (ref 8.4–10.5)
CALCIUM SERPL-MCNC: 8 MG/DL — LOW (ref 8.4–10.5)
CALCIUM SERPL-MCNC: 8 MG/DL — LOW (ref 8.4–10.5)
CALCIUM SERPL-MCNC: 8.1 MG/DL — LOW (ref 8.4–10.5)
CALCIUM SERPL-MCNC: 8.4 MG/DL — SIGNIFICANT CHANGE UP (ref 8.4–10.5)
CALCIUM SERPL-MCNC: 8.5 MG/DL — SIGNIFICANT CHANGE UP (ref 8.4–10.5)
CALCIUM SERPL-MCNC: 8.6 MG/DL — SIGNIFICANT CHANGE UP (ref 8.4–10.5)
CHLORIDE SERPL-SCNC: 102 MMOL/L — SIGNIFICANT CHANGE UP (ref 96–108)
CHLORIDE SERPL-SCNC: 102 MMOL/L — SIGNIFICANT CHANGE UP (ref 96–108)
CHLORIDE SERPL-SCNC: 103 MMOL/L — SIGNIFICANT CHANGE UP (ref 96–108)
CHLORIDE SERPL-SCNC: 103 MMOL/L — SIGNIFICANT CHANGE UP (ref 96–108)
CHLORIDE SERPL-SCNC: 105 MMOL/L — SIGNIFICANT CHANGE UP (ref 96–108)
CHLORIDE SERPL-SCNC: 105 MMOL/L — SIGNIFICANT CHANGE UP (ref 96–108)
CHLORIDE SERPL-SCNC: 107 MMOL/L — SIGNIFICANT CHANGE UP (ref 96–108)
CHLORIDE SERPL-SCNC: 108 MMOL/L — SIGNIFICANT CHANGE UP (ref 96–108)
CHLORIDE SERPL-SCNC: 108 MMOL/L — SIGNIFICANT CHANGE UP (ref 96–108)
CHLORIDE SERPL-SCNC: 109 MMOL/L — HIGH (ref 96–108)
CHOLEST SERPL-MCNC: 126 MG/DL — SIGNIFICANT CHANGE UP
CHOLEST SERPL-MCNC: 126 MG/DL — SIGNIFICANT CHANGE UP
CO2 SERPL-SCNC: 28 MMOL/L — SIGNIFICANT CHANGE UP (ref 22–31)
CO2 SERPL-SCNC: 30 MMOL/L — SIGNIFICANT CHANGE UP (ref 22–31)
CO2 SERPL-SCNC: 31 MMOL/L — SIGNIFICANT CHANGE UP (ref 22–31)
CO2 SERPL-SCNC: 31 MMOL/L — SIGNIFICANT CHANGE UP (ref 22–31)
CO2 SERPL-SCNC: 32 MMOL/L — HIGH (ref 22–31)
CO2 SERPL-SCNC: 32 MMOL/L — HIGH (ref 22–31)
CO2 SERPL-SCNC: 33 MMOL/L — HIGH (ref 22–31)
CO2 SERPL-SCNC: 33 MMOL/L — HIGH (ref 22–31)
CO2 SERPL-SCNC: 35 MMOL/L — HIGH (ref 22–31)
CO2 SERPL-SCNC: 36 MMOL/L — HIGH (ref 22–31)
CO2 SERPL-SCNC: 36 MMOL/L — HIGH (ref 22–31)
COLOR SPEC: SIGNIFICANT CHANGE UP
COLOR SPEC: SIGNIFICANT CHANGE UP
CREAT SERPL-MCNC: 0.61 MG/DL — SIGNIFICANT CHANGE UP (ref 0.5–1.3)
CREAT SERPL-MCNC: 0.61 MG/DL — SIGNIFICANT CHANGE UP (ref 0.5–1.3)
CREAT SERPL-MCNC: 0.63 MG/DL — SIGNIFICANT CHANGE UP (ref 0.5–1.3)
CREAT SERPL-MCNC: 0.63 MG/DL — SIGNIFICANT CHANGE UP (ref 0.5–1.3)
CREAT SERPL-MCNC: 0.69 MG/DL — SIGNIFICANT CHANGE UP (ref 0.5–1.3)
CREAT SERPL-MCNC: 0.69 MG/DL — SIGNIFICANT CHANGE UP (ref 0.5–1.3)
CREAT SERPL-MCNC: 0.71 MG/DL — SIGNIFICANT CHANGE UP (ref 0.5–1.3)
CREAT SERPL-MCNC: 0.72 MG/DL — SIGNIFICANT CHANGE UP (ref 0.5–1.3)
CREAT SERPL-MCNC: 0.72 MG/DL — SIGNIFICANT CHANGE UP (ref 0.5–1.3)
CREAT SERPL-MCNC: 0.73 MG/DL — SIGNIFICANT CHANGE UP (ref 0.5–1.3)
CREAT SERPL-MCNC: 0.75 MG/DL — SIGNIFICANT CHANGE UP (ref 0.5–1.3)
CREAT SERPL-MCNC: 0.75 MG/DL — SIGNIFICANT CHANGE UP (ref 0.5–1.3)
CREAT SERPL-MCNC: 0.81 MG/DL — SIGNIFICANT CHANGE UP (ref 0.5–1.3)
CREAT SERPL-MCNC: 0.81 MG/DL — SIGNIFICANT CHANGE UP (ref 0.5–1.3)
CREAT SERPL-MCNC: 1.01 MG/DL — SIGNIFICANT CHANGE UP (ref 0.5–1.3)
CREAT SERPL-MCNC: 1.01 MG/DL — SIGNIFICANT CHANGE UP (ref 0.5–1.3)
CULTURE RESULTS: ABNORMAL
CULTURE RESULTS: ABNORMAL
DACRYOCYTES BLD QL SMEAR: SLIGHT — SIGNIFICANT CHANGE UP
DACRYOCYTES BLD QL SMEAR: SLIGHT — SIGNIFICANT CHANGE UP
DIFF PNL FLD: ABNORMAL
DIFF PNL FLD: ABNORMAL
EGFR: 54 ML/MIN/1.73M2 — LOW
EGFR: 54 ML/MIN/1.73M2 — LOW
EGFR: 71 ML/MIN/1.73M2 — SIGNIFICANT CHANGE UP
EGFR: 71 ML/MIN/1.73M2 — SIGNIFICANT CHANGE UP
EGFR: 77 ML/MIN/1.73M2 — SIGNIFICANT CHANGE UP
EGFR: 77 ML/MIN/1.73M2 — SIGNIFICANT CHANGE UP
EGFR: 80 ML/MIN/1.73M2 — SIGNIFICANT CHANGE UP
EGFR: 81 ML/MIN/1.73M2 — SIGNIFICANT CHANGE UP
EGFR: 81 ML/MIN/1.73M2 — SIGNIFICANT CHANGE UP
EGFR: 83 ML/MIN/1.73M2 — SIGNIFICANT CHANGE UP
EGFR: 84 ML/MIN/1.73M2 — SIGNIFICANT CHANGE UP
EGFR: 84 ML/MIN/1.73M2 — SIGNIFICANT CHANGE UP
EGFR: 86 ML/MIN/1.73M2 — SIGNIFICANT CHANGE UP
EGFR: 86 ML/MIN/1.73M2 — SIGNIFICANT CHANGE UP
EGFR: 87 ML/MIN/1.73M2 — SIGNIFICANT CHANGE UP
EGFR: 87 ML/MIN/1.73M2 — SIGNIFICANT CHANGE UP
EOSINOPHIL # BLD AUTO: 0.01 K/UL — SIGNIFICANT CHANGE UP (ref 0–0.5)
EOSINOPHIL # BLD AUTO: 0.01 K/UL — SIGNIFICANT CHANGE UP (ref 0–0.5)
EOSINOPHIL # BLD AUTO: 0.02 K/UL — SIGNIFICANT CHANGE UP (ref 0–0.5)
EOSINOPHIL # BLD AUTO: 0.02 K/UL — SIGNIFICANT CHANGE UP (ref 0–0.5)
EOSINOPHIL # BLD AUTO: 0.04 K/UL — SIGNIFICANT CHANGE UP (ref 0–0.5)
EOSINOPHIL # BLD AUTO: 0.04 K/UL — SIGNIFICANT CHANGE UP (ref 0–0.5)
EOSINOPHIL # BLD AUTO: 0.09 K/UL — SIGNIFICANT CHANGE UP (ref 0–0.5)
EOSINOPHIL # BLD AUTO: 0.09 K/UL — SIGNIFICANT CHANGE UP (ref 0–0.5)
EOSINOPHIL # BLD AUTO: 0.12 K/UL — SIGNIFICANT CHANGE UP (ref 0–0.5)
EOSINOPHIL # BLD AUTO: 0.12 K/UL — SIGNIFICANT CHANGE UP (ref 0–0.5)
EOSINOPHIL # BLD AUTO: 0.13 K/UL — SIGNIFICANT CHANGE UP (ref 0–0.5)
EOSINOPHIL # BLD AUTO: 0.13 K/UL — SIGNIFICANT CHANGE UP (ref 0–0.5)
EOSINOPHIL # BLD AUTO: 0.14 K/UL — SIGNIFICANT CHANGE UP (ref 0–0.5)
EOSINOPHIL # BLD AUTO: 0.15 K/UL — SIGNIFICANT CHANGE UP (ref 0–0.5)
EOSINOPHIL # BLD AUTO: 0.15 K/UL — SIGNIFICANT CHANGE UP (ref 0–0.5)
EOSINOPHIL # BLD AUTO: 0.16 K/UL — SIGNIFICANT CHANGE UP (ref 0–0.5)
EOSINOPHIL # BLD AUTO: 0.16 K/UL — SIGNIFICANT CHANGE UP (ref 0–0.5)
EOSINOPHIL # BLD AUTO: 0.2 K/UL — SIGNIFICANT CHANGE UP (ref 0–0.5)
EOSINOPHIL # BLD AUTO: 0.2 K/UL — SIGNIFICANT CHANGE UP (ref 0–0.5)
EOSINOPHIL NFR BLD AUTO: 0.1 % — SIGNIFICANT CHANGE UP (ref 0–6)
EOSINOPHIL NFR BLD AUTO: 0.1 % — SIGNIFICANT CHANGE UP (ref 0–6)
EOSINOPHIL NFR BLD AUTO: 0.2 % — SIGNIFICANT CHANGE UP (ref 0–6)
EOSINOPHIL NFR BLD AUTO: 0.2 % — SIGNIFICANT CHANGE UP (ref 0–6)
EOSINOPHIL NFR BLD AUTO: 0.5 % — SIGNIFICANT CHANGE UP (ref 0–6)
EOSINOPHIL NFR BLD AUTO: 0.5 % — SIGNIFICANT CHANGE UP (ref 0–6)
EOSINOPHIL NFR BLD AUTO: 1.2 % — SIGNIFICANT CHANGE UP (ref 0–6)
EOSINOPHIL NFR BLD AUTO: 1.2 % — SIGNIFICANT CHANGE UP (ref 0–6)
EOSINOPHIL NFR BLD AUTO: 1.9 % — SIGNIFICANT CHANGE UP (ref 0–6)
EOSINOPHIL NFR BLD AUTO: 1.9 % — SIGNIFICANT CHANGE UP (ref 0–6)
EOSINOPHIL NFR BLD AUTO: 2.7 % — SIGNIFICANT CHANGE UP (ref 0–6)
EOSINOPHIL NFR BLD AUTO: 2.7 % — SIGNIFICANT CHANGE UP (ref 0–6)
EOSINOPHIL NFR BLD AUTO: 2.9 % — SIGNIFICANT CHANGE UP (ref 0–6)
EOSINOPHIL NFR BLD AUTO: 2.9 % — SIGNIFICANT CHANGE UP (ref 0–6)
EOSINOPHIL NFR BLD AUTO: 3.1 % — SIGNIFICANT CHANGE UP (ref 0–6)
EOSINOPHIL NFR BLD AUTO: 3.1 % — SIGNIFICANT CHANGE UP (ref 0–6)
EOSINOPHIL NFR BLD AUTO: 3.2 % — SIGNIFICANT CHANGE UP (ref 0–6)
EOSINOPHIL NFR BLD AUTO: 3.2 % — SIGNIFICANT CHANGE UP (ref 0–6)
EOSINOPHIL NFR BLD AUTO: 3.4 % — SIGNIFICANT CHANGE UP (ref 0–6)
EOSINOPHIL NFR BLD AUTO: 3.4 % — SIGNIFICANT CHANGE UP (ref 0–6)
EOSINOPHIL NFR BLD AUTO: 4 % — SIGNIFICANT CHANGE UP (ref 0–6)
EOSINOPHIL NFR BLD AUTO: 4 % — SIGNIFICANT CHANGE UP (ref 0–6)
ESTIMATED AVERAGE GLUCOSE: 117 MG/DL — HIGH (ref 68–114)
ESTIMATED AVERAGE GLUCOSE: 117 MG/DL — HIGH (ref 68–114)
GIANT PLATELETS BLD QL SMEAR: PRESENT — SIGNIFICANT CHANGE UP
GIANT PLATELETS BLD QL SMEAR: PRESENT — SIGNIFICANT CHANGE UP
GLUCOSE BLDC GLUCOMTR-MCNC: 102 MG/DL — HIGH (ref 70–99)
GLUCOSE BLDC GLUCOMTR-MCNC: 102 MG/DL — HIGH (ref 70–99)
GLUCOSE BLDC GLUCOMTR-MCNC: 93 MG/DL — SIGNIFICANT CHANGE UP (ref 70–99)
GLUCOSE BLDC GLUCOMTR-MCNC: 93 MG/DL — SIGNIFICANT CHANGE UP (ref 70–99)
GLUCOSE SERPL-MCNC: 104 MG/DL — HIGH (ref 70–99)
GLUCOSE SERPL-MCNC: 104 MG/DL — HIGH (ref 70–99)
GLUCOSE SERPL-MCNC: 76 MG/DL — SIGNIFICANT CHANGE UP (ref 70–99)
GLUCOSE SERPL-MCNC: 76 MG/DL — SIGNIFICANT CHANGE UP (ref 70–99)
GLUCOSE SERPL-MCNC: 77 MG/DL — SIGNIFICANT CHANGE UP (ref 70–99)
GLUCOSE SERPL-MCNC: 77 MG/DL — SIGNIFICANT CHANGE UP (ref 70–99)
GLUCOSE SERPL-MCNC: 86 MG/DL — SIGNIFICANT CHANGE UP (ref 70–99)
GLUCOSE SERPL-MCNC: 86 MG/DL — SIGNIFICANT CHANGE UP (ref 70–99)
GLUCOSE SERPL-MCNC: 88 MG/DL — SIGNIFICANT CHANGE UP (ref 70–99)
GLUCOSE SERPL-MCNC: 88 MG/DL — SIGNIFICANT CHANGE UP (ref 70–99)
GLUCOSE SERPL-MCNC: 93 MG/DL — SIGNIFICANT CHANGE UP (ref 70–99)
GLUCOSE SERPL-MCNC: 93 MG/DL — SIGNIFICANT CHANGE UP (ref 70–99)
GLUCOSE SERPL-MCNC: 94 MG/DL — SIGNIFICANT CHANGE UP (ref 70–99)
GLUCOSE SERPL-MCNC: 95 MG/DL — SIGNIFICANT CHANGE UP (ref 70–99)
GLUCOSE SERPL-MCNC: 95 MG/DL — SIGNIFICANT CHANGE UP (ref 70–99)
GLUCOSE SERPL-MCNC: 98 MG/DL — SIGNIFICANT CHANGE UP (ref 70–99)
GLUCOSE SERPL-MCNC: 98 MG/DL — SIGNIFICANT CHANGE UP (ref 70–99)
GLUCOSE UR QL: NEGATIVE MG/DL — SIGNIFICANT CHANGE UP
GLUCOSE UR QL: NEGATIVE MG/DL — SIGNIFICANT CHANGE UP
HCT VFR BLD CALC: 36.4 % — SIGNIFICANT CHANGE UP (ref 34.5–45)
HCT VFR BLD CALC: 36.4 % — SIGNIFICANT CHANGE UP (ref 34.5–45)
HCT VFR BLD CALC: 37.5 % — SIGNIFICANT CHANGE UP (ref 34.5–45)
HCT VFR BLD CALC: 37.5 % — SIGNIFICANT CHANGE UP (ref 34.5–45)
HCT VFR BLD CALC: 37.8 % — SIGNIFICANT CHANGE UP (ref 34.5–45)
HCT VFR BLD CALC: 37.8 % — SIGNIFICANT CHANGE UP (ref 34.5–45)
HCT VFR BLD CALC: 38.1 % — SIGNIFICANT CHANGE UP (ref 34.5–45)
HCT VFR BLD CALC: 38.1 % — SIGNIFICANT CHANGE UP (ref 34.5–45)
HCT VFR BLD CALC: 38.2 % — SIGNIFICANT CHANGE UP (ref 34.5–45)
HCT VFR BLD CALC: 38.2 % — SIGNIFICANT CHANGE UP (ref 34.5–45)
HCT VFR BLD CALC: 38.5 % — SIGNIFICANT CHANGE UP (ref 34.5–45)
HCT VFR BLD CALC: 38.5 % — SIGNIFICANT CHANGE UP (ref 34.5–45)
HCT VFR BLD CALC: 38.7 % — SIGNIFICANT CHANGE UP (ref 34.5–45)
HCT VFR BLD CALC: 38.7 % — SIGNIFICANT CHANGE UP (ref 34.5–45)
HCT VFR BLD CALC: 39.7 % — SIGNIFICANT CHANGE UP (ref 34.5–45)
HCT VFR BLD CALC: 39.7 % — SIGNIFICANT CHANGE UP (ref 34.5–45)
HCT VFR BLD CALC: 39.8 % — SIGNIFICANT CHANGE UP (ref 34.5–45)
HCT VFR BLD CALC: 39.8 % — SIGNIFICANT CHANGE UP (ref 34.5–45)
HCT VFR BLD CALC: 41.4 % — SIGNIFICANT CHANGE UP (ref 34.5–45)
HCT VFR BLD CALC: 41.4 % — SIGNIFICANT CHANGE UP (ref 34.5–45)
HCT VFR BLD CALC: 43.1 % — SIGNIFICANT CHANGE UP (ref 34.5–45)
HCT VFR BLD CALC: 43.1 % — SIGNIFICANT CHANGE UP (ref 34.5–45)
HDLC SERPL-MCNC: 63 MG/DL — SIGNIFICANT CHANGE UP
HDLC SERPL-MCNC: 63 MG/DL — SIGNIFICANT CHANGE UP
HGB BLD-MCNC: 12.2 G/DL — SIGNIFICANT CHANGE UP (ref 11.5–15.5)
HGB BLD-MCNC: 12.2 G/DL — SIGNIFICANT CHANGE UP (ref 11.5–15.5)
HGB BLD-MCNC: 12.5 G/DL — SIGNIFICANT CHANGE UP (ref 11.5–15.5)
HGB BLD-MCNC: 12.5 G/DL — SIGNIFICANT CHANGE UP (ref 11.5–15.5)
HGB BLD-MCNC: 12.8 G/DL — SIGNIFICANT CHANGE UP (ref 11.5–15.5)
HGB BLD-MCNC: 12.9 G/DL — SIGNIFICANT CHANGE UP (ref 11.5–15.5)
HGB BLD-MCNC: 12.9 G/DL — SIGNIFICANT CHANGE UP (ref 11.5–15.5)
HGB BLD-MCNC: 13 G/DL — SIGNIFICANT CHANGE UP (ref 11.5–15.5)
HGB BLD-MCNC: 13 G/DL — SIGNIFICANT CHANGE UP (ref 11.5–15.5)
HGB BLD-MCNC: 13.4 G/DL — SIGNIFICANT CHANGE UP (ref 11.5–15.5)
HGB BLD-MCNC: 13.4 G/DL — SIGNIFICANT CHANGE UP (ref 11.5–15.5)
HGB BLD-MCNC: 13.5 G/DL — SIGNIFICANT CHANGE UP (ref 11.5–15.5)
HGB BLD-MCNC: 13.5 G/DL — SIGNIFICANT CHANGE UP (ref 11.5–15.5)
HGB BLD-MCNC: 14.1 G/DL — SIGNIFICANT CHANGE UP (ref 11.5–15.5)
HGB BLD-MCNC: 14.1 G/DL — SIGNIFICANT CHANGE UP (ref 11.5–15.5)
HGB BLD-MCNC: 14.4 G/DL — SIGNIFICANT CHANGE UP (ref 11.5–15.5)
HGB BLD-MCNC: 14.4 G/DL — SIGNIFICANT CHANGE UP (ref 11.5–15.5)
IMM GRANULOCYTES NFR BLD AUTO: 0.2 % — SIGNIFICANT CHANGE UP (ref 0–0.9)
IMM GRANULOCYTES NFR BLD AUTO: 0.3 % — SIGNIFICANT CHANGE UP (ref 0–0.9)
IMM GRANULOCYTES NFR BLD AUTO: 0.3 % — SIGNIFICANT CHANGE UP (ref 0–0.9)
IMM GRANULOCYTES NFR BLD AUTO: 0.4 % — SIGNIFICANT CHANGE UP (ref 0–0.9)
IMM GRANULOCYTES NFR BLD AUTO: 1 % — HIGH (ref 0–0.9)
IMM GRANULOCYTES NFR BLD AUTO: 1 % — HIGH (ref 0–0.9)
INR BLD: 1.58 RATIO — HIGH (ref 0.85–1.18)
INR BLD: 1.58 RATIO — HIGH (ref 0.85–1.18)
KETONES UR-MCNC: 15 MG/DL
KETONES UR-MCNC: 15 MG/DL
LEUKOCYTE ESTERASE UR-ACNC: ABNORMAL
LEUKOCYTE ESTERASE UR-ACNC: ABNORMAL
LG PLATELETS BLD QL AUTO: SIGNIFICANT CHANGE UP
LG PLATELETS BLD QL AUTO: SIGNIFICANT CHANGE UP
LIPID PNL WITH DIRECT LDL SERPL: 50 MG/DL — SIGNIFICANT CHANGE UP
LIPID PNL WITH DIRECT LDL SERPL: 50 MG/DL — SIGNIFICANT CHANGE UP
LYMPHOCYTES # BLD AUTO: 0.55 K/UL — LOW (ref 1–3.3)
LYMPHOCYTES # BLD AUTO: 0.55 K/UL — LOW (ref 1–3.3)
LYMPHOCYTES # BLD AUTO: 0.6 K/UL — LOW (ref 1–3.3)
LYMPHOCYTES # BLD AUTO: 0.6 K/UL — LOW (ref 1–3.3)
LYMPHOCYTES # BLD AUTO: 0.7 K/UL — LOW (ref 1–3.3)
LYMPHOCYTES # BLD AUTO: 0.7 K/UL — LOW (ref 1–3.3)
LYMPHOCYTES # BLD AUTO: 0.75 K/UL — LOW (ref 1–3.3)
LYMPHOCYTES # BLD AUTO: 0.75 K/UL — LOW (ref 1–3.3)
LYMPHOCYTES # BLD AUTO: 0.79 K/UL — LOW (ref 1–3.3)
LYMPHOCYTES # BLD AUTO: 0.79 K/UL — LOW (ref 1–3.3)
LYMPHOCYTES # BLD AUTO: 0.89 K/UL — LOW (ref 1–3.3)
LYMPHOCYTES # BLD AUTO: 0.92 K/UL — LOW (ref 1–3.3)
LYMPHOCYTES # BLD AUTO: 0.94 K/UL — LOW (ref 1–3.3)
LYMPHOCYTES # BLD AUTO: 0.94 K/UL — LOW (ref 1–3.3)
LYMPHOCYTES # BLD AUTO: 1.04 K/UL — SIGNIFICANT CHANGE UP (ref 1–3.3)
LYMPHOCYTES # BLD AUTO: 1.04 K/UL — SIGNIFICANT CHANGE UP (ref 1–3.3)
LYMPHOCYTES # BLD AUTO: 10.3 % — LOW (ref 13–44)
LYMPHOCYTES # BLD AUTO: 10.3 % — LOW (ref 13–44)
LYMPHOCYTES # BLD AUTO: 14 % — SIGNIFICANT CHANGE UP (ref 13–44)
LYMPHOCYTES # BLD AUTO: 14 % — SIGNIFICANT CHANGE UP (ref 13–44)
LYMPHOCYTES # BLD AUTO: 15.6 % — SIGNIFICANT CHANGE UP (ref 13–44)
LYMPHOCYTES # BLD AUTO: 15.6 % — SIGNIFICANT CHANGE UP (ref 13–44)
LYMPHOCYTES # BLD AUTO: 15.8 % — SIGNIFICANT CHANGE UP (ref 13–44)
LYMPHOCYTES # BLD AUTO: 15.8 % — SIGNIFICANT CHANGE UP (ref 13–44)
LYMPHOCYTES # BLD AUTO: 17.7 % — SIGNIFICANT CHANGE UP (ref 13–44)
LYMPHOCYTES # BLD AUTO: 17.7 % — SIGNIFICANT CHANGE UP (ref 13–44)
LYMPHOCYTES # BLD AUTO: 20.9 % — SIGNIFICANT CHANGE UP (ref 13–44)
LYMPHOCYTES # BLD AUTO: 20.9 % — SIGNIFICANT CHANGE UP (ref 13–44)
LYMPHOCYTES # BLD AUTO: 21.2 % — SIGNIFICANT CHANGE UP (ref 13–44)
LYMPHOCYTES # BLD AUTO: 21.2 % — SIGNIFICANT CHANGE UP (ref 13–44)
LYMPHOCYTES # BLD AUTO: 21.5 % — SIGNIFICANT CHANGE UP (ref 13–44)
LYMPHOCYTES # BLD AUTO: 21.5 % — SIGNIFICANT CHANGE UP (ref 13–44)
LYMPHOCYTES # BLD AUTO: 6.8 % — LOW (ref 13–44)
LYMPHOCYTES # BLD AUTO: 6.8 % — LOW (ref 13–44)
LYMPHOCYTES # BLD AUTO: 7.1 % — LOW (ref 13–44)
LYMPHOCYTES # BLD AUTO: 7.1 % — LOW (ref 13–44)
LYMPHOCYTES # BLD AUTO: 9.6 % — LOW (ref 13–44)
LYMPHOCYTES # BLD AUTO: 9.6 % — LOW (ref 13–44)
MAGNESIUM SERPL-MCNC: 1.8 MG/DL — SIGNIFICANT CHANGE UP (ref 1.6–2.6)
MAGNESIUM SERPL-MCNC: 1.9 MG/DL — SIGNIFICANT CHANGE UP (ref 1.6–2.6)
MAGNESIUM SERPL-MCNC: 2 MG/DL — SIGNIFICANT CHANGE UP (ref 1.6–2.6)
MAGNESIUM SERPL-MCNC: 2 MG/DL — SIGNIFICANT CHANGE UP (ref 1.6–2.6)
MAGNESIUM SERPL-MCNC: 2.1 MG/DL — SIGNIFICANT CHANGE UP (ref 1.6–2.6)
MAGNESIUM SERPL-MCNC: 2.1 MG/DL — SIGNIFICANT CHANGE UP (ref 1.6–2.6)
MANUAL SMEAR VERIFICATION: SIGNIFICANT CHANGE UP
MCHC RBC-ENTMCNC: 25.6 PG — LOW (ref 27–34)
MCHC RBC-ENTMCNC: 25.6 PG — LOW (ref 27–34)
MCHC RBC-ENTMCNC: 25.7 PG — LOW (ref 27–34)
MCHC RBC-ENTMCNC: 25.8 PG — LOW (ref 27–34)
MCHC RBC-ENTMCNC: 25.9 PG — LOW (ref 27–34)
MCHC RBC-ENTMCNC: 26.2 PG — LOW (ref 27–34)
MCHC RBC-ENTMCNC: 33.1 GM/DL — SIGNIFICANT CHANGE UP (ref 32–36)
MCHC RBC-ENTMCNC: 33.1 GM/DL — SIGNIFICANT CHANGE UP (ref 32–36)
MCHC RBC-ENTMCNC: 33.2 GM/DL — SIGNIFICANT CHANGE UP (ref 32–36)
MCHC RBC-ENTMCNC: 33.2 GM/DL — SIGNIFICANT CHANGE UP (ref 32–36)
MCHC RBC-ENTMCNC: 33.3 GM/DL — SIGNIFICANT CHANGE UP (ref 32–36)
MCHC RBC-ENTMCNC: 33.3 GM/DL — SIGNIFICANT CHANGE UP (ref 32–36)
MCHC RBC-ENTMCNC: 33.4 GM/DL — SIGNIFICANT CHANGE UP (ref 32–36)
MCHC RBC-ENTMCNC: 33.4 GM/DL — SIGNIFICANT CHANGE UP (ref 32–36)
MCHC RBC-ENTMCNC: 33.5 GM/DL — SIGNIFICANT CHANGE UP (ref 32–36)
MCHC RBC-ENTMCNC: 33.5 GM/DL — SIGNIFICANT CHANGE UP (ref 32–36)
MCHC RBC-ENTMCNC: 33.6 GM/DL — SIGNIFICANT CHANGE UP (ref 32–36)
MCHC RBC-ENTMCNC: 33.6 GM/DL — SIGNIFICANT CHANGE UP (ref 32–36)
MCHC RBC-ENTMCNC: 33.8 GM/DL — SIGNIFICANT CHANGE UP (ref 32–36)
MCHC RBC-ENTMCNC: 33.8 GM/DL — SIGNIFICANT CHANGE UP (ref 32–36)
MCHC RBC-ENTMCNC: 33.9 GM/DL — SIGNIFICANT CHANGE UP (ref 32–36)
MCHC RBC-ENTMCNC: 33.9 GM/DL — SIGNIFICANT CHANGE UP (ref 32–36)
MCHC RBC-ENTMCNC: 34 GM/DL — SIGNIFICANT CHANGE UP (ref 32–36)
MCHC RBC-ENTMCNC: 34 GM/DL — SIGNIFICANT CHANGE UP (ref 32–36)
MCHC RBC-ENTMCNC: 34.1 GM/DL — SIGNIFICANT CHANGE UP (ref 32–36)
MCV RBC AUTO: 75.7 FL — LOW (ref 80–100)
MCV RBC AUTO: 75.7 FL — LOW (ref 80–100)
MCV RBC AUTO: 76.1 FL — LOW (ref 80–100)
MCV RBC AUTO: 76.1 FL — LOW (ref 80–100)
MCV RBC AUTO: 76.5 FL — LOW (ref 80–100)
MCV RBC AUTO: 76.5 FL — LOW (ref 80–100)
MCV RBC AUTO: 76.6 FL — LOW (ref 80–100)
MCV RBC AUTO: 76.6 FL — LOW (ref 80–100)
MCV RBC AUTO: 76.7 FL — LOW (ref 80–100)
MCV RBC AUTO: 76.7 FL — LOW (ref 80–100)
MCV RBC AUTO: 76.8 FL — LOW (ref 80–100)
MCV RBC AUTO: 76.9 FL — LOW (ref 80–100)
MCV RBC AUTO: 76.9 FL — LOW (ref 80–100)
MCV RBC AUTO: 77.4 FL — LOW (ref 80–100)
MCV RBC AUTO: 77.4 FL — LOW (ref 80–100)
MCV RBC AUTO: 77.6 FL — LOW (ref 80–100)
MCV RBC AUTO: 77.6 FL — LOW (ref 80–100)
MCV RBC AUTO: 77.8 FL — LOW (ref 80–100)
MCV RBC AUTO: 77.8 FL — LOW (ref 80–100)
METHOD TYPE: SIGNIFICANT CHANGE UP
METHOD TYPE: SIGNIFICANT CHANGE UP
MICROCYTES BLD QL: SLIGHT — SIGNIFICANT CHANGE UP
MICROCYTES BLD QL: SLIGHT — SIGNIFICANT CHANGE UP
MONOCYTES # BLD AUTO: 0.75 K/UL — SIGNIFICANT CHANGE UP (ref 0–0.9)
MONOCYTES # BLD AUTO: 0.75 K/UL — SIGNIFICANT CHANGE UP (ref 0–0.9)
MONOCYTES # BLD AUTO: 0.78 K/UL — SIGNIFICANT CHANGE UP (ref 0–0.9)
MONOCYTES # BLD AUTO: 0.78 K/UL — SIGNIFICANT CHANGE UP (ref 0–0.9)
MONOCYTES # BLD AUTO: 0.81 K/UL — SIGNIFICANT CHANGE UP (ref 0–0.9)
MONOCYTES # BLD AUTO: 0.81 K/UL — SIGNIFICANT CHANGE UP (ref 0–0.9)
MONOCYTES # BLD AUTO: 0.82 K/UL — SIGNIFICANT CHANGE UP (ref 0–0.9)
MONOCYTES # BLD AUTO: 0.82 K/UL — SIGNIFICANT CHANGE UP (ref 0–0.9)
MONOCYTES # BLD AUTO: 0.84 K/UL — SIGNIFICANT CHANGE UP (ref 0–0.9)
MONOCYTES # BLD AUTO: 0.84 K/UL — SIGNIFICANT CHANGE UP (ref 0–0.9)
MONOCYTES # BLD AUTO: 0.86 K/UL — SIGNIFICANT CHANGE UP (ref 0–0.9)
MONOCYTES # BLD AUTO: 0.86 K/UL — SIGNIFICANT CHANGE UP (ref 0–0.9)
MONOCYTES # BLD AUTO: 1.01 K/UL — HIGH (ref 0–0.9)
MONOCYTES # BLD AUTO: 1.19 K/UL — HIGH (ref 0–0.9)
MONOCYTES # BLD AUTO: 1.19 K/UL — HIGH (ref 0–0.9)
MONOCYTES # BLD AUTO: 1.27 K/UL — HIGH (ref 0–0.9)
MONOCYTES # BLD AUTO: 1.27 K/UL — HIGH (ref 0–0.9)
MONOCYTES # BLD AUTO: 1.34 K/UL — HIGH (ref 0–0.9)
MONOCYTES # BLD AUTO: 1.34 K/UL — HIGH (ref 0–0.9)
MONOCYTES NFR BLD AUTO: 15.5 % — HIGH (ref 2–14)
MONOCYTES NFR BLD AUTO: 15.5 % — HIGH (ref 2–14)
MONOCYTES NFR BLD AUTO: 15.6 % — HIGH (ref 2–14)
MONOCYTES NFR BLD AUTO: 15.6 % — HIGH (ref 2–14)
MONOCYTES NFR BLD AUTO: 15.7 % — HIGH (ref 2–14)
MONOCYTES NFR BLD AUTO: 15.7 % — HIGH (ref 2–14)
MONOCYTES NFR BLD AUTO: 15.9 % — HIGH (ref 2–14)
MONOCYTES NFR BLD AUTO: 15.9 % — HIGH (ref 2–14)
MONOCYTES NFR BLD AUTO: 16.3 % — HIGH (ref 2–14)
MONOCYTES NFR BLD AUTO: 16.3 % — HIGH (ref 2–14)
MONOCYTES NFR BLD AUTO: 16.8 % — HIGH (ref 2–14)
MONOCYTES NFR BLD AUTO: 16.8 % — HIGH (ref 2–14)
MONOCYTES NFR BLD AUTO: 17.6 % — HIGH (ref 2–14)
MONOCYTES NFR BLD AUTO: 17.6 % — HIGH (ref 2–14)
MONOCYTES NFR BLD AUTO: 17.9 % — HIGH (ref 2–14)
MONOCYTES NFR BLD AUTO: 17.9 % — HIGH (ref 2–14)
MONOCYTES NFR BLD AUTO: 18.6 % — HIGH (ref 2–14)
MONOCYTES NFR BLD AUTO: 18.6 % — HIGH (ref 2–14)
MONOCYTES NFR BLD AUTO: 19.5 % — HIGH (ref 2–14)
MONOCYTES NFR BLD AUTO: 19.5 % — HIGH (ref 2–14)
MONOCYTES NFR BLD AUTO: 9.6 % — SIGNIFICANT CHANGE UP (ref 2–14)
MONOCYTES NFR BLD AUTO: 9.6 % — SIGNIFICANT CHANGE UP (ref 2–14)
NEUTROPHILS # BLD AUTO: 2.49 K/UL — SIGNIFICANT CHANGE UP (ref 1.8–7.4)
NEUTROPHILS # BLD AUTO: 2.49 K/UL — SIGNIFICANT CHANGE UP (ref 1.8–7.4)
NEUTROPHILS # BLD AUTO: 2.52 K/UL — SIGNIFICANT CHANGE UP (ref 1.8–7.4)
NEUTROPHILS # BLD AUTO: 2.52 K/UL — SIGNIFICANT CHANGE UP (ref 1.8–7.4)
NEUTROPHILS # BLD AUTO: 2.86 K/UL — SIGNIFICANT CHANGE UP (ref 1.8–7.4)
NEUTROPHILS # BLD AUTO: 2.86 K/UL — SIGNIFICANT CHANGE UP (ref 1.8–7.4)
NEUTROPHILS # BLD AUTO: 3.02 K/UL — SIGNIFICANT CHANGE UP (ref 1.8–7.4)
NEUTROPHILS # BLD AUTO: 3.02 K/UL — SIGNIFICANT CHANGE UP (ref 1.8–7.4)
NEUTROPHILS # BLD AUTO: 3.05 K/UL — SIGNIFICANT CHANGE UP (ref 1.8–7.4)
NEUTROPHILS # BLD AUTO: 3.05 K/UL — SIGNIFICANT CHANGE UP (ref 1.8–7.4)
NEUTROPHILS # BLD AUTO: 3.11 K/UL — SIGNIFICANT CHANGE UP (ref 1.8–7.4)
NEUTROPHILS # BLD AUTO: 3.11 K/UL — SIGNIFICANT CHANGE UP (ref 1.8–7.4)
NEUTROPHILS # BLD AUTO: 4.29 K/UL — SIGNIFICANT CHANGE UP (ref 1.8–7.4)
NEUTROPHILS # BLD AUTO: 4.29 K/UL — SIGNIFICANT CHANGE UP (ref 1.8–7.4)
NEUTROPHILS # BLD AUTO: 5.23 K/UL — SIGNIFICANT CHANGE UP (ref 1.8–7.4)
NEUTROPHILS # BLD AUTO: 5.23 K/UL — SIGNIFICANT CHANGE UP (ref 1.8–7.4)
NEUTROPHILS # BLD AUTO: 6.21 K/UL — SIGNIFICANT CHANGE UP (ref 1.8–7.4)
NEUTROPHILS # BLD AUTO: 6.21 K/UL — SIGNIFICANT CHANGE UP (ref 1.8–7.4)
NEUTROPHILS # BLD AUTO: 6.29 K/UL — SIGNIFICANT CHANGE UP (ref 1.8–7.4)
NEUTROPHILS # BLD AUTO: 6.29 K/UL — SIGNIFICANT CHANGE UP (ref 1.8–7.4)
NEUTROPHILS # BLD AUTO: 6.96 K/UL — SIGNIFICANT CHANGE UP (ref 1.8–7.4)
NEUTROPHILS # BLD AUTO: 6.96 K/UL — SIGNIFICANT CHANGE UP (ref 1.8–7.4)
NEUTROPHILS NFR BLD AUTO: 56.6 % — SIGNIFICANT CHANGE UP (ref 43–77)
NEUTROPHILS NFR BLD AUTO: 56.6 % — SIGNIFICANT CHANGE UP (ref 43–77)
NEUTROPHILS NFR BLD AUTO: 56.9 % — SIGNIFICANT CHANGE UP (ref 43–77)
NEUTROPHILS NFR BLD AUTO: 56.9 % — SIGNIFICANT CHANGE UP (ref 43–77)
NEUTROPHILS NFR BLD AUTO: 58.7 % — SIGNIFICANT CHANGE UP (ref 43–77)
NEUTROPHILS NFR BLD AUTO: 58.7 % — SIGNIFICANT CHANGE UP (ref 43–77)
NEUTROPHILS NFR BLD AUTO: 59.1 % — SIGNIFICANT CHANGE UP (ref 43–77)
NEUTROPHILS NFR BLD AUTO: 59.1 % — SIGNIFICANT CHANGE UP (ref 43–77)
NEUTROPHILS NFR BLD AUTO: 62.2 % — SIGNIFICANT CHANGE UP (ref 43–77)
NEUTROPHILS NFR BLD AUTO: 62.2 % — SIGNIFICANT CHANGE UP (ref 43–77)
NEUTROPHILS NFR BLD AUTO: 62.8 % — SIGNIFICANT CHANGE UP (ref 43–77)
NEUTROPHILS NFR BLD AUTO: 62.8 % — SIGNIFICANT CHANGE UP (ref 43–77)
NEUTROPHILS NFR BLD AUTO: 67.6 % — SIGNIFICANT CHANGE UP (ref 43–77)
NEUTROPHILS NFR BLD AUTO: 67.6 % — SIGNIFICANT CHANGE UP (ref 43–77)
NEUTROPHILS NFR BLD AUTO: 71.6 % — SIGNIFICANT CHANGE UP (ref 43–77)
NEUTROPHILS NFR BLD AUTO: 71.6 % — SIGNIFICANT CHANGE UP (ref 43–77)
NEUTROPHILS NFR BLD AUTO: 73.2 % — SIGNIFICANT CHANGE UP (ref 43–77)
NEUTROPHILS NFR BLD AUTO: 73.2 % — SIGNIFICANT CHANGE UP (ref 43–77)
NEUTROPHILS NFR BLD AUTO: 76.7 % — SIGNIFICANT CHANGE UP (ref 43–77)
NEUTROPHILS NFR BLD AUTO: 76.7 % — SIGNIFICANT CHANGE UP (ref 43–77)
NEUTROPHILS NFR BLD AUTO: 82.4 % — HIGH (ref 43–77)
NEUTROPHILS NFR BLD AUTO: 82.4 % — HIGH (ref 43–77)
NITRITE UR-MCNC: NEGATIVE — SIGNIFICANT CHANGE UP
NITRITE UR-MCNC: NEGATIVE — SIGNIFICANT CHANGE UP
NON HDL CHOLESTEROL: 63 MG/DL — SIGNIFICANT CHANGE UP
NON HDL CHOLESTEROL: 63 MG/DL — SIGNIFICANT CHANGE UP
NRBC # BLD: 0 /100 WBCS — SIGNIFICANT CHANGE UP (ref 0–0)
ORGANISM # SPEC MICROSCOPIC CNT: ABNORMAL
PH UR: 6 — SIGNIFICANT CHANGE UP (ref 5–8)
PH UR: 6 — SIGNIFICANT CHANGE UP (ref 5–8)
PHOSPHATE SERPL-MCNC: 3 MG/DL — SIGNIFICANT CHANGE UP (ref 2.5–4.5)
PHOSPHATE SERPL-MCNC: 3 MG/DL — SIGNIFICANT CHANGE UP (ref 2.5–4.5)
PHOSPHATE SERPL-MCNC: 3.2 MG/DL — SIGNIFICANT CHANGE UP (ref 2.5–4.5)
PHOSPHATE SERPL-MCNC: 3.3 MG/DL — SIGNIFICANT CHANGE UP (ref 2.5–4.5)
PHOSPHATE SERPL-MCNC: 3.4 MG/DL — SIGNIFICANT CHANGE UP (ref 2.5–4.5)
PHOSPHATE SERPL-MCNC: 3.4 MG/DL — SIGNIFICANT CHANGE UP (ref 2.5–4.5)
PHOSPHATE SERPL-MCNC: 3.5 MG/DL — SIGNIFICANT CHANGE UP (ref 2.5–4.5)
PHOSPHATE SERPL-MCNC: 3.5 MG/DL — SIGNIFICANT CHANGE UP (ref 2.5–4.5)
PLAT MORPH BLD: NORMAL — SIGNIFICANT CHANGE UP
PLATELET # BLD AUTO: 249 K/UL — SIGNIFICANT CHANGE UP (ref 150–400)
PLATELET # BLD AUTO: 249 K/UL — SIGNIFICANT CHANGE UP (ref 150–400)
PLATELET # BLD AUTO: 251 K/UL — SIGNIFICANT CHANGE UP (ref 150–400)
PLATELET # BLD AUTO: 251 K/UL — SIGNIFICANT CHANGE UP (ref 150–400)
PLATELET # BLD AUTO: 278 K/UL — SIGNIFICANT CHANGE UP (ref 150–400)
PLATELET # BLD AUTO: 278 K/UL — SIGNIFICANT CHANGE UP (ref 150–400)
PLATELET # BLD AUTO: 290 K/UL — SIGNIFICANT CHANGE UP (ref 150–400)
PLATELET # BLD AUTO: 290 K/UL — SIGNIFICANT CHANGE UP (ref 150–400)
PLATELET # BLD AUTO: 291 K/UL — SIGNIFICANT CHANGE UP (ref 150–400)
PLATELET # BLD AUTO: 291 K/UL — SIGNIFICANT CHANGE UP (ref 150–400)
PLATELET # BLD AUTO: 292 K/UL — SIGNIFICANT CHANGE UP (ref 150–400)
PLATELET # BLD AUTO: 292 K/UL — SIGNIFICANT CHANGE UP (ref 150–400)
PLATELET # BLD AUTO: 307 K/UL — SIGNIFICANT CHANGE UP (ref 150–400)
PLATELET # BLD AUTO: 315 K/UL — SIGNIFICANT CHANGE UP (ref 150–400)
PLATELET # BLD AUTO: 315 K/UL — SIGNIFICANT CHANGE UP (ref 150–400)
PLATELET # BLD AUTO: 343 K/UL — SIGNIFICANT CHANGE UP (ref 150–400)
PLATELET # BLD AUTO: 343 K/UL — SIGNIFICANT CHANGE UP (ref 150–400)
PLATELET # BLD AUTO: 377 K/UL — SIGNIFICANT CHANGE UP (ref 150–400)
PLATELET # BLD AUTO: 377 K/UL — SIGNIFICANT CHANGE UP (ref 150–400)
PLATELET COUNT - ESTIMATE: NORMAL — SIGNIFICANT CHANGE UP
PLATELET COUNT - ESTIMATE: NORMAL — SIGNIFICANT CHANGE UP
POIKILOCYTOSIS BLD QL AUTO: SLIGHT — SIGNIFICANT CHANGE UP
POIKILOCYTOSIS BLD QL AUTO: SLIGHT — SIGNIFICANT CHANGE UP
POTASSIUM SERPL-MCNC: 3.3 MMOL/L — LOW (ref 3.5–5.3)
POTASSIUM SERPL-MCNC: 3.3 MMOL/L — LOW (ref 3.5–5.3)
POTASSIUM SERPL-MCNC: 3.4 MMOL/L — LOW (ref 3.5–5.3)
POTASSIUM SERPL-MCNC: 3.4 MMOL/L — LOW (ref 3.5–5.3)
POTASSIUM SERPL-MCNC: 3.5 MMOL/L — SIGNIFICANT CHANGE UP (ref 3.5–5.3)
POTASSIUM SERPL-MCNC: 3.5 MMOL/L — SIGNIFICANT CHANGE UP (ref 3.5–5.3)
POTASSIUM SERPL-MCNC: 3.7 MMOL/L — SIGNIFICANT CHANGE UP (ref 3.5–5.3)
POTASSIUM SERPL-MCNC: 3.8 MMOL/L — SIGNIFICANT CHANGE UP (ref 3.5–5.3)
POTASSIUM SERPL-MCNC: 3.9 MMOL/L — SIGNIFICANT CHANGE UP (ref 3.5–5.3)
POTASSIUM SERPL-MCNC: 3.9 MMOL/L — SIGNIFICANT CHANGE UP (ref 3.5–5.3)
POTASSIUM SERPL-SCNC: 3.3 MMOL/L — LOW (ref 3.5–5.3)
POTASSIUM SERPL-SCNC: 3.3 MMOL/L — LOW (ref 3.5–5.3)
POTASSIUM SERPL-SCNC: 3.4 MMOL/L — LOW (ref 3.5–5.3)
POTASSIUM SERPL-SCNC: 3.4 MMOL/L — LOW (ref 3.5–5.3)
POTASSIUM SERPL-SCNC: 3.5 MMOL/L — SIGNIFICANT CHANGE UP (ref 3.5–5.3)
POTASSIUM SERPL-SCNC: 3.5 MMOL/L — SIGNIFICANT CHANGE UP (ref 3.5–5.3)
POTASSIUM SERPL-SCNC: 3.7 MMOL/L — SIGNIFICANT CHANGE UP (ref 3.5–5.3)
POTASSIUM SERPL-SCNC: 3.8 MMOL/L — SIGNIFICANT CHANGE UP (ref 3.5–5.3)
POTASSIUM SERPL-SCNC: 3.9 MMOL/L — SIGNIFICANT CHANGE UP (ref 3.5–5.3)
POTASSIUM SERPL-SCNC: 3.9 MMOL/L — SIGNIFICANT CHANGE UP (ref 3.5–5.3)
PROT SERPL-MCNC: 5.8 G/DL — LOW (ref 6–8.3)
PROT SERPL-MCNC: 5.8 G/DL — LOW (ref 6–8.3)
PROT SERPL-MCNC: 6 G/DL — SIGNIFICANT CHANGE UP (ref 6–8.3)
PROT SERPL-MCNC: 6.1 G/DL — SIGNIFICANT CHANGE UP (ref 6–8.3)
PROT SERPL-MCNC: 6.1 G/DL — SIGNIFICANT CHANGE UP (ref 6–8.3)
PROT SERPL-MCNC: 6.2 G/DL — SIGNIFICANT CHANGE UP (ref 6–8.3)
PROT SERPL-MCNC: 6.2 G/DL — SIGNIFICANT CHANGE UP (ref 6–8.3)
PROT SERPL-MCNC: 6.3 G/DL — SIGNIFICANT CHANGE UP (ref 6–8.3)
PROT SERPL-MCNC: 6.3 G/DL — SIGNIFICANT CHANGE UP (ref 6–8.3)
PROT SERPL-MCNC: 6.5 G/DL — SIGNIFICANT CHANGE UP (ref 6–8.3)
PROT SERPL-MCNC: 6.5 G/DL — SIGNIFICANT CHANGE UP (ref 6–8.3)
PROT SERPL-MCNC: 6.8 G/DL — SIGNIFICANT CHANGE UP (ref 6–8.3)
PROT SERPL-MCNC: 6.8 G/DL — SIGNIFICANT CHANGE UP (ref 6–8.3)
PROT UR-MCNC: 100 MG/DL
PROT UR-MCNC: 100 MG/DL
PROTHROM AB SERPL-ACNC: 17.8 SEC — HIGH (ref 9.5–13)
PROTHROM AB SERPL-ACNC: 17.8 SEC — HIGH (ref 9.5–13)
RBC # BLD: 4.75 M/UL — SIGNIFICANT CHANGE UP (ref 3.8–5.2)
RBC # BLD: 4.75 M/UL — SIGNIFICANT CHANGE UP (ref 3.8–5.2)
RBC # BLD: 4.87 M/UL — SIGNIFICANT CHANGE UP (ref 3.8–5.2)
RBC # BLD: 4.87 M/UL — SIGNIFICANT CHANGE UP (ref 3.8–5.2)
RBC # BLD: 4.89 M/UL — SIGNIFICANT CHANGE UP (ref 3.8–5.2)
RBC # BLD: 4.89 M/UL — SIGNIFICANT CHANGE UP (ref 3.8–5.2)
RBC # BLD: 4.95 M/UL — SIGNIFICANT CHANGE UP (ref 3.8–5.2)
RBC # BLD: 4.95 M/UL — SIGNIFICANT CHANGE UP (ref 3.8–5.2)
RBC # BLD: 4.96 M/UL — SIGNIFICANT CHANGE UP (ref 3.8–5.2)
RBC # BLD: 4.96 M/UL — SIGNIFICANT CHANGE UP (ref 3.8–5.2)
RBC # BLD: 5.02 M/UL — SIGNIFICANT CHANGE UP (ref 3.8–5.2)
RBC # BLD: 5.02 M/UL — SIGNIFICANT CHANGE UP (ref 3.8–5.2)
RBC # BLD: 5.03 M/UL — SIGNIFICANT CHANGE UP (ref 3.8–5.2)
RBC # BLD: 5.03 M/UL — SIGNIFICANT CHANGE UP (ref 3.8–5.2)
RBC # BLD: 5.19 M/UL — SIGNIFICANT CHANGE UP (ref 3.8–5.2)
RBC # BLD: 5.19 M/UL — SIGNIFICANT CHANGE UP (ref 3.8–5.2)
RBC # BLD: 5.26 M/UL — HIGH (ref 3.8–5.2)
RBC # BLD: 5.26 M/UL — HIGH (ref 3.8–5.2)
RBC # BLD: 5.39 M/UL — HIGH (ref 3.8–5.2)
RBC # BLD: 5.39 M/UL — HIGH (ref 3.8–5.2)
RBC # BLD: 5.57 M/UL — HIGH (ref 3.8–5.2)
RBC # BLD: 5.57 M/UL — HIGH (ref 3.8–5.2)
RBC # FLD: 15.9 % — HIGH (ref 10.3–14.5)
RBC # FLD: 15.9 % — HIGH (ref 10.3–14.5)
RBC # FLD: 16 % — HIGH (ref 10.3–14.5)
RBC # FLD: 16.1 % — HIGH (ref 10.3–14.5)
RBC # FLD: 16.2 % — HIGH (ref 10.3–14.5)
RBC # FLD: 16.2 % — HIGH (ref 10.3–14.5)
RBC # FLD: 16.4 % — HIGH (ref 10.3–14.5)
RBC BLD AUTO: ABNORMAL
RBC BLD AUTO: ABNORMAL
RBC BLD AUTO: NORMAL — SIGNIFICANT CHANGE UP
RBC BLD AUTO: NORMAL — SIGNIFICANT CHANGE UP
SARS-COV-2 RNA SPEC QL NAA+PROBE: SIGNIFICANT CHANGE UP
SARS-COV-2 RNA SPEC QL NAA+PROBE: SIGNIFICANT CHANGE UP
SODIUM SERPL-SCNC: 141 MMOL/L — SIGNIFICANT CHANGE UP (ref 135–145)
SODIUM SERPL-SCNC: 141 MMOL/L — SIGNIFICANT CHANGE UP (ref 135–145)
SODIUM SERPL-SCNC: 143 MMOL/L — SIGNIFICANT CHANGE UP (ref 135–145)
SODIUM SERPL-SCNC: 144 MMOL/L — SIGNIFICANT CHANGE UP (ref 135–145)
SODIUM SERPL-SCNC: 145 MMOL/L — SIGNIFICANT CHANGE UP (ref 135–145)
SODIUM SERPL-SCNC: 145 MMOL/L — SIGNIFICANT CHANGE UP (ref 135–145)
SODIUM SERPL-SCNC: 146 MMOL/L — HIGH (ref 135–145)
SODIUM SERPL-SCNC: 147 MMOL/L — HIGH (ref 135–145)
SODIUM SERPL-SCNC: 147 MMOL/L — HIGH (ref 135–145)
SP GR SPEC: 1.02 — SIGNIFICANT CHANGE UP (ref 1–1.03)
SP GR SPEC: 1.02 — SIGNIFICANT CHANGE UP (ref 1–1.03)
SPECIMEN SOURCE: SIGNIFICANT CHANGE UP
SPECIMEN SOURCE: SIGNIFICANT CHANGE UP
SURGICAL PATHOLOGY STUDY: SIGNIFICANT CHANGE UP
SURGICAL PATHOLOGY STUDY: SIGNIFICANT CHANGE UP
TRIGL SERPL-MCNC: 67 MG/DL — SIGNIFICANT CHANGE UP
TRIGL SERPL-MCNC: 67 MG/DL — SIGNIFICANT CHANGE UP
TROPONIN I, HIGH SENSITIVITY RESULT: 131.3 NG/L — HIGH
TROPONIN I, HIGH SENSITIVITY RESULT: 131.3 NG/L — HIGH
TROPONIN I, HIGH SENSITIVITY RESULT: 356 NG/L — HIGH
TROPONIN I, HIGH SENSITIVITY RESULT: 356 NG/L — HIGH
TROPONIN I, HIGH SENSITIVITY RESULT: 483 NG/L — HIGH
TROPONIN I, HIGH SENSITIVITY RESULT: 483 NG/L — HIGH
TSH SERPL-MCNC: 1.19 UU/ML — SIGNIFICANT CHANGE UP (ref 0.34–4.82)
TSH SERPL-MCNC: 1.19 UU/ML — SIGNIFICANT CHANGE UP (ref 0.34–4.82)
UROBILINOGEN FLD QL: 2 MG/DL (ref 0.2–1)
UROBILINOGEN FLD QL: 2 MG/DL (ref 0.2–1)
VIT B12 SERPL-MCNC: 404 PG/ML — SIGNIFICANT CHANGE UP (ref 232–1245)
VIT B12 SERPL-MCNC: 404 PG/ML — SIGNIFICANT CHANGE UP (ref 232–1245)
WBC # BLD: 4.4 K/UL — SIGNIFICANT CHANGE UP (ref 3.8–10.5)
WBC # BLD: 4.4 K/UL — SIGNIFICANT CHANGE UP (ref 3.8–10.5)
WBC # BLD: 4.43 K/UL — SIGNIFICANT CHANGE UP (ref 3.8–10.5)
WBC # BLD: 4.43 K/UL — SIGNIFICANT CHANGE UP (ref 3.8–10.5)
WBC # BLD: 4.81 K/UL — SIGNIFICANT CHANGE UP (ref 3.8–10.5)
WBC # BLD: 4.81 K/UL — SIGNIFICANT CHANGE UP (ref 3.8–10.5)
WBC # BLD: 4.84 K/UL — SIGNIFICANT CHANGE UP (ref 3.8–10.5)
WBC # BLD: 4.84 K/UL — SIGNIFICANT CHANGE UP (ref 3.8–10.5)
WBC # BLD: 5 K/UL — SIGNIFICANT CHANGE UP (ref 3.8–10.5)
WBC # BLD: 5 K/UL — SIGNIFICANT CHANGE UP (ref 3.8–10.5)
WBC # BLD: 5.19 K/UL — SIGNIFICANT CHANGE UP (ref 3.8–10.5)
WBC # BLD: 5.19 K/UL — SIGNIFICANT CHANGE UP (ref 3.8–10.5)
WBC # BLD: 6.35 K/UL — SIGNIFICANT CHANGE UP (ref 3.8–10.5)
WBC # BLD: 6.35 K/UL — SIGNIFICANT CHANGE UP (ref 3.8–10.5)
WBC # BLD: 7.3 K/UL — SIGNIFICANT CHANGE UP (ref 3.8–10.5)
WBC # BLD: 7.3 K/UL — SIGNIFICANT CHANGE UP (ref 3.8–10.5)
WBC # BLD: 8.1 K/UL — SIGNIFICANT CHANGE UP (ref 3.8–10.5)
WBC # BLD: 8.1 K/UL — SIGNIFICANT CHANGE UP (ref 3.8–10.5)
WBC # BLD: 8.44 K/UL — SIGNIFICANT CHANGE UP (ref 3.8–10.5)
WBC # BLD: 8.44 K/UL — SIGNIFICANT CHANGE UP (ref 3.8–10.5)
WBC # BLD: 8.6 K/UL — SIGNIFICANT CHANGE UP (ref 3.8–10.5)
WBC # BLD: 8.6 K/UL — SIGNIFICANT CHANGE UP (ref 3.8–10.5)
WBC # FLD AUTO: 4.4 K/UL — SIGNIFICANT CHANGE UP (ref 3.8–10.5)
WBC # FLD AUTO: 4.4 K/UL — SIGNIFICANT CHANGE UP (ref 3.8–10.5)
WBC # FLD AUTO: 4.43 K/UL — SIGNIFICANT CHANGE UP (ref 3.8–10.5)
WBC # FLD AUTO: 4.43 K/UL — SIGNIFICANT CHANGE UP (ref 3.8–10.5)
WBC # FLD AUTO: 4.81 K/UL — SIGNIFICANT CHANGE UP (ref 3.8–10.5)
WBC # FLD AUTO: 4.81 K/UL — SIGNIFICANT CHANGE UP (ref 3.8–10.5)
WBC # FLD AUTO: 4.84 K/UL — SIGNIFICANT CHANGE UP (ref 3.8–10.5)
WBC # FLD AUTO: 4.84 K/UL — SIGNIFICANT CHANGE UP (ref 3.8–10.5)
WBC # FLD AUTO: 5 K/UL — SIGNIFICANT CHANGE UP (ref 3.8–10.5)
WBC # FLD AUTO: 5 K/UL — SIGNIFICANT CHANGE UP (ref 3.8–10.5)
WBC # FLD AUTO: 5.19 K/UL — SIGNIFICANT CHANGE UP (ref 3.8–10.5)
WBC # FLD AUTO: 5.19 K/UL — SIGNIFICANT CHANGE UP (ref 3.8–10.5)
WBC # FLD AUTO: 6.35 K/UL — SIGNIFICANT CHANGE UP (ref 3.8–10.5)
WBC # FLD AUTO: 6.35 K/UL — SIGNIFICANT CHANGE UP (ref 3.8–10.5)
WBC # FLD AUTO: 7.3 K/UL — SIGNIFICANT CHANGE UP (ref 3.8–10.5)
WBC # FLD AUTO: 7.3 K/UL — SIGNIFICANT CHANGE UP (ref 3.8–10.5)
WBC # FLD AUTO: 8.1 K/UL — SIGNIFICANT CHANGE UP (ref 3.8–10.5)
WBC # FLD AUTO: 8.1 K/UL — SIGNIFICANT CHANGE UP (ref 3.8–10.5)
WBC # FLD AUTO: 8.44 K/UL — SIGNIFICANT CHANGE UP (ref 3.8–10.5)
WBC # FLD AUTO: 8.44 K/UL — SIGNIFICANT CHANGE UP (ref 3.8–10.5)
WBC # FLD AUTO: 8.6 K/UL — SIGNIFICANT CHANGE UP (ref 3.8–10.5)
WBC # FLD AUTO: 8.6 K/UL — SIGNIFICANT CHANGE UP (ref 3.8–10.5)

## 2023-01-01 PROCEDURE — 97116 GAIT TRAINING THERAPY: CPT

## 2023-01-01 PROCEDURE — 93880 EXTRACRANIAL BILAT STUDY: CPT

## 2023-01-01 PROCEDURE — 71045 X-RAY EXAM CHEST 1 VIEW: CPT

## 2023-01-01 PROCEDURE — 70450 CT HEAD/BRAIN W/O DYE: CPT | Mod: MA

## 2023-01-01 PROCEDURE — 80061 LIPID PANEL: CPT

## 2023-01-01 PROCEDURE — 87077 CULTURE AEROBIC IDENTIFY: CPT

## 2023-01-01 PROCEDURE — 88312 SPECIAL STAINS GROUP 1: CPT

## 2023-01-01 PROCEDURE — 93010 ELECTROCARDIOGRAM REPORT: CPT

## 2023-01-01 PROCEDURE — 87186 SC STD MICRODIL/AGAR DIL: CPT

## 2023-01-01 PROCEDURE — 74230 X-RAY XM SWLNG FUNCJ C+: CPT

## 2023-01-01 PROCEDURE — 85025 COMPLETE CBC W/AUTO DIFF WBC: CPT

## 2023-01-01 PROCEDURE — 82306 VITAMIN D 25 HYDROXY: CPT

## 2023-01-01 PROCEDURE — 87086 URINE CULTURE/COLONY COUNT: CPT

## 2023-01-01 PROCEDURE — L9981: CPT

## 2023-01-01 PROCEDURE — 80053 COMPREHEN METABOLIC PANEL: CPT

## 2023-01-01 PROCEDURE — 93306 TTE W/DOPPLER COMPLETE: CPT | Mod: 26

## 2023-01-01 PROCEDURE — 74230 X-RAY XM SWLNG FUNCJ C+: CPT | Mod: 26

## 2023-01-01 PROCEDURE — 84100 ASSAY OF PHOSPHORUS: CPT

## 2023-01-01 PROCEDURE — 88305 TISSUE EXAM BY PATHOLOGIST: CPT | Mod: 26

## 2023-01-01 PROCEDURE — 70551 MRI BRAIN STEM W/O DYE: CPT | Mod: 26

## 2023-01-01 PROCEDURE — 43247 EGD REMOVE FOREIGN BODY: CPT

## 2023-01-01 PROCEDURE — 43239 EGD BIOPSY SINGLE/MULTIPLE: CPT

## 2023-01-01 PROCEDURE — 88305 TISSUE EXAM BY PATHOLOGIST: CPT

## 2023-01-01 PROCEDURE — 97530 THERAPEUTIC ACTIVITIES: CPT

## 2023-01-01 PROCEDURE — 99232 SBSQ HOSP IP/OBS MODERATE 35: CPT

## 2023-01-01 PROCEDURE — 99291 CRITICAL CARE FIRST HOUR: CPT

## 2023-01-01 PROCEDURE — 82962 GLUCOSE BLOOD TEST: CPT

## 2023-01-01 PROCEDURE — 92526 ORAL FUNCTION THERAPY: CPT

## 2023-01-01 PROCEDURE — 87635 SARS-COV-2 COVID-19 AMP PRB: CPT

## 2023-01-01 PROCEDURE — 70551 MRI BRAIN STEM W/O DYE: CPT

## 2023-01-01 PROCEDURE — 80048 BASIC METABOLIC PNL TOTAL CA: CPT

## 2023-01-01 PROCEDURE — 92610 EVALUATE SWALLOWING FUNCTION: CPT

## 2023-01-01 PROCEDURE — 85610 PROTHROMBIN TIME: CPT

## 2023-01-01 PROCEDURE — 97110 THERAPEUTIC EXERCISES: CPT

## 2023-01-01 PROCEDURE — 83735 ASSAY OF MAGNESIUM: CPT

## 2023-01-01 PROCEDURE — 97163 PT EVAL HIGH COMPLEX 45 MIN: CPT

## 2023-01-01 PROCEDURE — 93880 EXTRACRANIAL BILAT STUDY: CPT | Mod: 26

## 2023-01-01 PROCEDURE — 99222 1ST HOSP IP/OBS MODERATE 55: CPT

## 2023-01-01 PROCEDURE — 70450 CT HEAD/BRAIN W/O DYE: CPT | Mod: 26,MA

## 2023-01-01 PROCEDURE — 82607 VITAMIN B-12: CPT

## 2023-01-01 PROCEDURE — 83036 HEMOGLOBIN GLYCOSYLATED A1C: CPT

## 2023-01-01 PROCEDURE — 92611 MOTION FLUOROSCOPY/SWALLOW: CPT

## 2023-01-01 PROCEDURE — 85730 THROMBOPLASTIN TIME PARTIAL: CPT

## 2023-01-01 PROCEDURE — 36415 COLL VENOUS BLD VENIPUNCTURE: CPT

## 2023-01-01 PROCEDURE — 71250 CT THORAX DX C-: CPT | Mod: 26

## 2023-01-01 PROCEDURE — 96375 TX/PRO/DX INJ NEW DRUG ADDON: CPT

## 2023-01-01 PROCEDURE — 71250 CT THORAX DX C-: CPT

## 2023-01-01 PROCEDURE — 71045 X-RAY EXAM CHEST 1 VIEW: CPT | Mod: 26

## 2023-01-01 PROCEDURE — 93306 TTE W/DOPPLER COMPLETE: CPT

## 2023-01-01 PROCEDURE — 96374 THER/PROPH/DIAG INJ IV PUSH: CPT

## 2023-01-01 PROCEDURE — 88312 SPECIAL STAINS GROUP 1: CPT | Mod: 26

## 2023-01-01 PROCEDURE — 96372 THER/PROPH/DIAG INJ SC/IM: CPT | Mod: XU

## 2023-01-01 PROCEDURE — 84443 ASSAY THYROID STIM HORMONE: CPT

## 2023-01-01 PROCEDURE — 84484 ASSAY OF TROPONIN QUANT: CPT

## 2023-01-01 PROCEDURE — 93005 ELECTROCARDIOGRAM TRACING: CPT

## 2023-01-01 PROCEDURE — 81001 URINALYSIS AUTO W/SCOPE: CPT

## 2023-01-01 RX ORDER — CEFTRIAXONE 500 MG/1
1000 INJECTION, POWDER, FOR SOLUTION INTRAMUSCULAR; INTRAVENOUS ONCE
Refills: 0 | Status: COMPLETED | OUTPATIENT
Start: 2023-01-01 | End: 2023-01-01

## 2023-01-01 RX ORDER — CEFTRIAXONE 500 MG/1
1000 INJECTION, POWDER, FOR SOLUTION INTRAMUSCULAR; INTRAVENOUS EVERY 24 HOURS
Refills: 0 | Status: COMPLETED | OUTPATIENT
Start: 2023-01-01 | End: 2023-01-01

## 2023-01-01 RX ORDER — FUROSEMIDE 40 MG
20 TABLET ORAL ONCE
Refills: 0 | Status: COMPLETED | OUTPATIENT
Start: 2023-01-01 | End: 2023-01-01

## 2023-01-01 RX ORDER — PANTOPRAZOLE SODIUM 20 MG/1
40 TABLET, DELAYED RELEASE ORAL DAILY
Refills: 0 | Status: DISCONTINUED | OUTPATIENT
Start: 2023-01-01 | End: 2023-01-01

## 2023-01-01 RX ORDER — NYSTATIN CREAM 100000 [USP'U]/G
1 CREAM TOPICAL EVERY 12 HOURS
Refills: 0 | Status: DISCONTINUED | OUTPATIENT
Start: 2023-01-01 | End: 2023-01-01

## 2023-01-01 RX ORDER — TRIAMTERENE/HYDROCHLOROTHIAZID 75 MG-50MG
1 TABLET ORAL
Qty: 0 | Refills: 0 | DISCHARGE

## 2023-01-01 RX ORDER — DRONEDARONE 400 MG/1
400 TABLET, FILM COATED ORAL
Refills: 0 | Status: DISCONTINUED | OUTPATIENT
Start: 2023-01-01 | End: 2023-01-01

## 2023-01-01 RX ORDER — SENNA PLUS 8.6 MG/1
2 TABLET ORAL
Refills: 0 | DISCHARGE

## 2023-01-01 RX ORDER — SENNA PLUS 8.6 MG/1
2 TABLET ORAL
Qty: 0 | Refills: 0 | DISCHARGE
Start: 2023-01-01

## 2023-01-01 RX ORDER — FLUCONAZOLE 150 MG/1
2 TABLET ORAL
Qty: 0 | Refills: 0 | DISCHARGE
Start: 2023-01-01

## 2023-01-01 RX ORDER — NYSTATIN CREAM 100000 [USP'U]/G
1 CREAM TOPICAL
Qty: 0 | Refills: 0 | DISCHARGE
Start: 2023-01-01

## 2023-01-01 RX ORDER — ERGOCALCIFEROL 1.25 MG/1
50000 CAPSULE ORAL
Refills: 0 | Status: DISCONTINUED | OUTPATIENT
Start: 2023-01-01 | End: 2023-01-01

## 2023-01-01 RX ORDER — TIMOLOL 0.5 %
1 DROPS OPHTHALMIC (EYE)
Qty: 0 | Refills: 0 | DISCHARGE

## 2023-01-01 RX ORDER — METOPROLOL TARTRATE 50 MG
1 TABLET ORAL
Qty: 0 | Refills: 0 | DISCHARGE
Start: 2023-01-01

## 2023-01-01 RX ORDER — ATORVASTATIN CALCIUM 80 MG/1
20 TABLET, FILM COATED ORAL AT BEDTIME
Refills: 0 | Status: DISCONTINUED | OUTPATIENT
Start: 2023-01-01 | End: 2023-01-01

## 2023-01-01 RX ORDER — LANOLIN ALCOHOL/MO/W.PET/CERES
3 CREAM (GRAM) TOPICAL AT BEDTIME
Refills: 0 | Status: COMPLETED | OUTPATIENT
Start: 2023-01-01 | End: 2023-01-01

## 2023-01-01 RX ORDER — ASPIRIN/CALCIUM CARB/MAGNESIUM 324 MG
81 TABLET ORAL DAILY
Refills: 0 | Status: DISCONTINUED | OUTPATIENT
Start: 2023-01-01 | End: 2023-01-01

## 2023-01-01 RX ORDER — METOPROLOL TARTRATE 50 MG
25 TABLET ORAL EVERY 8 HOURS
Refills: 0 | Status: DISCONTINUED | OUTPATIENT
Start: 2023-01-01 | End: 2023-01-01

## 2023-01-01 RX ORDER — FLUCONAZOLE 150 MG/1
400 TABLET ORAL DAILY
Refills: 0 | Status: DISCONTINUED | OUTPATIENT
Start: 2023-01-01 | End: 2023-01-01

## 2023-01-01 RX ORDER — ASPIRIN/CALCIUM CARB/MAGNESIUM 324 MG
1 TABLET ORAL
Qty: 0 | Refills: 0 | DISCHARGE

## 2023-01-01 RX ORDER — POTASSIUM CHLORIDE 20 MEQ
10 PACKET (EA) ORAL
Refills: 0 | Status: COMPLETED | OUTPATIENT
Start: 2023-01-01 | End: 2023-01-01

## 2023-01-01 RX ORDER — POLYETHYLENE GLYCOL 3350 17 G/17G
17 POWDER, FOR SOLUTION ORAL
Refills: 0 | Status: DISCONTINUED | OUTPATIENT
Start: 2023-01-01 | End: 2023-01-01

## 2023-01-01 RX ORDER — POTASSIUM CHLORIDE 20 MEQ
40 PACKET (EA) ORAL ONCE
Refills: 0 | Status: COMPLETED | OUTPATIENT
Start: 2023-01-01 | End: 2023-01-01

## 2023-01-01 RX ORDER — SODIUM CHLORIDE 9 MG/ML
1000 INJECTION, SOLUTION INTRAVENOUS
Refills: 0 | Status: DISCONTINUED | OUTPATIENT
Start: 2023-01-01 | End: 2023-01-01

## 2023-01-01 RX ORDER — CLOPIDOGREL BISULFATE 75 MG/1
1 TABLET, FILM COATED ORAL
Refills: 0 | DISCHARGE

## 2023-01-01 RX ORDER — FLUCONAZOLE 200 MG/1
200 TABLET ORAL DAILY
Qty: 14 | Refills: 0 | Status: ACTIVE | COMMUNITY
Start: 2023-01-01 | End: 1900-01-01

## 2023-01-01 RX ORDER — APIXABAN 2.5 MG/1
5 TABLET, FILM COATED ORAL
Refills: 0 | Status: DISCONTINUED | OUTPATIENT
Start: 2023-01-01 | End: 2023-01-01

## 2023-01-01 RX ORDER — ATORVASTATIN CALCIUM 80 MG/1
80 TABLET, FILM COATED ORAL AT BEDTIME
Refills: 0 | Status: DISCONTINUED | OUTPATIENT
Start: 2023-01-01 | End: 2023-01-01

## 2023-01-01 RX ORDER — POTASSIUM CHLORIDE 20 MEQ
40 PACKET (EA) ORAL EVERY 4 HOURS
Refills: 0 | Status: DISCONTINUED | OUTPATIENT
Start: 2023-01-01 | End: 2023-01-01

## 2023-01-01 RX ORDER — ERGOCALCIFEROL 1.25 MG/1
1 CAPSULE ORAL
Qty: 6 | Refills: 0
Start: 2023-01-01 | End: 2023-01-01

## 2023-01-01 RX ORDER — POTASSIUM CHLORIDE 20 MEQ
10 PACKET (EA) ORAL
Refills: 0 | Status: DISCONTINUED | OUTPATIENT
Start: 2023-01-01 | End: 2023-01-01

## 2023-01-01 RX ORDER — IBUPROFEN 200 MG
0 TABLET ORAL
Qty: 0 | Refills: 0 | DISCHARGE

## 2023-01-01 RX ORDER — APIXABAN 2.5 MG/1
1 TABLET, FILM COATED ORAL
Refills: 0 | DISCHARGE

## 2023-01-01 RX ORDER — FUROSEMIDE 40 MG
20 TABLET ORAL DAILY
Refills: 0 | Status: DISCONTINUED | OUTPATIENT
Start: 2023-01-01 | End: 2023-01-01

## 2023-01-01 RX ORDER — ENOXAPARIN SODIUM 100 MG/ML
75 INJECTION SUBCUTANEOUS EVERY 12 HOURS
Refills: 0 | Status: DISCONTINUED | OUTPATIENT
Start: 2023-01-01 | End: 2023-01-01

## 2023-01-01 RX ORDER — HALOPERIDOL DECANOATE 100 MG/ML
2 INJECTION INTRAMUSCULAR ONCE
Refills: 0 | Status: COMPLETED | OUTPATIENT
Start: 2023-01-01 | End: 2023-01-01

## 2023-01-01 RX ORDER — APIXABAN 2.5 MG/1
1 TABLET, FILM COATED ORAL
Qty: 0 | Refills: 0 | DISCHARGE
Start: 2023-01-01

## 2023-01-01 RX ORDER — HALOPERIDOL DECANOATE 100 MG/ML
2.5 INJECTION INTRAMUSCULAR ONCE
Refills: 0 | Status: COMPLETED | OUTPATIENT
Start: 2023-01-01 | End: 2023-01-01

## 2023-01-01 RX ORDER — SENNA PLUS 8.6 MG/1
2 TABLET ORAL AT BEDTIME
Refills: 0 | Status: DISCONTINUED | OUTPATIENT
Start: 2023-01-01 | End: 2023-01-01

## 2023-01-01 RX ORDER — EZETIMIBE AND SIMVASTATIN 10; 80 MG/1; MG/1
1 TABLET, FILM COATED ORAL
Qty: 0 | Refills: 0 | DISCHARGE

## 2023-01-01 RX ADMIN — ATORVASTATIN CALCIUM 80 MILLIGRAM(S): 80 TABLET, FILM COATED ORAL at 22:24

## 2023-01-01 RX ADMIN — CEFTRIAXONE 100 MILLIGRAM(S): 500 INJECTION, POWDER, FOR SOLUTION INTRAMUSCULAR; INTRAVENOUS at 21:25

## 2023-01-01 RX ADMIN — Medication 20 MILLIGRAM(S): at 05:49

## 2023-01-01 RX ADMIN — POLYETHYLENE GLYCOL 3350 17 GRAM(S): 17 POWDER, FOR SOLUTION ORAL at 17:21

## 2023-01-01 RX ADMIN — Medication 40 MILLIEQUIVALENT(S): at 12:05

## 2023-01-01 RX ADMIN — Medication 25 MILLIGRAM(S): at 05:59

## 2023-01-01 RX ADMIN — APIXABAN 5 MILLIGRAM(S): 2.5 TABLET, FILM COATED ORAL at 22:29

## 2023-01-01 RX ADMIN — APIXABAN 5 MILLIGRAM(S): 2.5 TABLET, FILM COATED ORAL at 17:20

## 2023-01-01 RX ADMIN — SODIUM CHLORIDE 50 MILLILITER(S): 9 INJECTION, SOLUTION INTRAVENOUS at 17:31

## 2023-01-01 RX ADMIN — Medication 25 MILLIGRAM(S): at 21:14

## 2023-01-01 RX ADMIN — Medication 25 MILLIGRAM(S): at 22:21

## 2023-01-01 RX ADMIN — CEFTRIAXONE 100 MILLIGRAM(S): 500 INJECTION, POWDER, FOR SOLUTION INTRAMUSCULAR; INTRAVENOUS at 12:18

## 2023-01-01 RX ADMIN — APIXABAN 5 MILLIGRAM(S): 2.5 TABLET, FILM COATED ORAL at 09:35

## 2023-01-01 RX ADMIN — CEFTRIAXONE 100 MILLIGRAM(S): 500 INJECTION, POWDER, FOR SOLUTION INTRAMUSCULAR; INTRAVENOUS at 22:02

## 2023-01-01 RX ADMIN — NYSTATIN CREAM 1 APPLICATION(S): 100000 CREAM TOPICAL at 17:21

## 2023-01-01 RX ADMIN — PANTOPRAZOLE SODIUM 40 MILLIGRAM(S): 20 TABLET, DELAYED RELEASE ORAL at 11:56

## 2023-01-01 RX ADMIN — DRONEDARONE 400 MILLIGRAM(S): 400 TABLET, FILM COATED ORAL at 18:27

## 2023-01-01 RX ADMIN — CEFTRIAXONE 100 MILLIGRAM(S): 500 INJECTION, POWDER, FOR SOLUTION INTRAMUSCULAR; INTRAVENOUS at 11:19

## 2023-01-01 RX ADMIN — PANTOPRAZOLE SODIUM 40 MILLIGRAM(S): 20 TABLET, DELAYED RELEASE ORAL at 12:24

## 2023-01-01 RX ADMIN — PANTOPRAZOLE SODIUM 40 MILLIGRAM(S): 20 TABLET, DELAYED RELEASE ORAL at 12:46

## 2023-01-01 RX ADMIN — PANTOPRAZOLE SODIUM 40 MILLIGRAM(S): 20 TABLET, DELAYED RELEASE ORAL at 12:25

## 2023-01-01 RX ADMIN — PANTOPRAZOLE SODIUM 40 MILLIGRAM(S): 20 TABLET, DELAYED RELEASE ORAL at 12:18

## 2023-01-01 RX ADMIN — ENOXAPARIN SODIUM 75 MILLIGRAM(S): 100 INJECTION SUBCUTANEOUS at 06:23

## 2023-01-01 RX ADMIN — Medication 25 MILLIGRAM(S): at 22:06

## 2023-01-01 RX ADMIN — Medication 25 MILLIGRAM(S): at 15:28

## 2023-01-01 RX ADMIN — APIXABAN 5 MILLIGRAM(S): 2.5 TABLET, FILM COATED ORAL at 05:13

## 2023-01-01 RX ADMIN — ATORVASTATIN CALCIUM 80 MILLIGRAM(S): 80 TABLET, FILM COATED ORAL at 22:06

## 2023-01-01 RX ADMIN — DRONEDARONE 400 MILLIGRAM(S): 400 TABLET, FILM COATED ORAL at 17:20

## 2023-01-01 RX ADMIN — ATORVASTATIN CALCIUM 20 MILLIGRAM(S): 80 TABLET, FILM COATED ORAL at 21:14

## 2023-01-01 RX ADMIN — ATORVASTATIN CALCIUM 80 MILLIGRAM(S): 80 TABLET, FILM COATED ORAL at 22:29

## 2023-01-01 RX ADMIN — ENOXAPARIN SODIUM 75 MILLIGRAM(S): 100 INJECTION SUBCUTANEOUS at 05:40

## 2023-01-01 RX ADMIN — Medication 25 MILLIGRAM(S): at 05:54

## 2023-01-01 RX ADMIN — Medication 25 MILLIGRAM(S): at 22:29

## 2023-01-01 RX ADMIN — Medication 20 MILLIGRAM(S): at 05:54

## 2023-01-01 RX ADMIN — Medication 3 MILLIGRAM(S): at 22:18

## 2023-01-01 RX ADMIN — DRONEDARONE 400 MILLIGRAM(S): 400 TABLET, FILM COATED ORAL at 05:45

## 2023-01-01 RX ADMIN — Medication 25 MILLIGRAM(S): at 06:52

## 2023-01-01 RX ADMIN — ATORVASTATIN CALCIUM 80 MILLIGRAM(S): 80 TABLET, FILM COATED ORAL at 22:55

## 2023-01-01 RX ADMIN — NYSTATIN CREAM 1 APPLICATION(S): 100000 CREAM TOPICAL at 05:54

## 2023-01-01 RX ADMIN — PANTOPRAZOLE SODIUM 40 MILLIGRAM(S): 20 TABLET, DELAYED RELEASE ORAL at 11:18

## 2023-01-01 RX ADMIN — FLUCONAZOLE 400 MILLIGRAM(S): 150 TABLET ORAL at 12:46

## 2023-01-01 RX ADMIN — Medication 3 MILLIGRAM(S): at 21:38

## 2023-01-01 RX ADMIN — NYSTATIN CREAM 1 APPLICATION(S): 100000 CREAM TOPICAL at 18:27

## 2023-01-01 RX ADMIN — Medication 20 MILLIGRAM(S): at 05:45

## 2023-01-01 RX ADMIN — Medication 25 MILLIGRAM(S): at 22:23

## 2023-01-01 RX ADMIN — SENNA PLUS 2 TABLET(S): 8.6 TABLET ORAL at 22:29

## 2023-01-01 RX ADMIN — Medication 81 MILLIGRAM(S): at 11:27

## 2023-01-01 RX ADMIN — Medication 20 MILLIGRAM(S): at 11:18

## 2023-01-01 RX ADMIN — Medication 1 MILLIGRAM(S): at 19:07

## 2023-01-01 RX ADMIN — DRONEDARONE 400 MILLIGRAM(S): 400 TABLET, FILM COATED ORAL at 17:03

## 2023-01-01 RX ADMIN — NYSTATIN CREAM 1 APPLICATION(S): 100000 CREAM TOPICAL at 17:22

## 2023-01-01 RX ADMIN — Medication 100 MILLIEQUIVALENT(S): at 13:18

## 2023-01-01 RX ADMIN — Medication 25 MILLIGRAM(S): at 05:13

## 2023-01-01 RX ADMIN — APIXABAN 5 MILLIGRAM(S): 2.5 TABLET, FILM COATED ORAL at 06:19

## 2023-01-01 RX ADMIN — APIXABAN 5 MILLIGRAM(S): 2.5 TABLET, FILM COATED ORAL at 05:54

## 2023-01-01 RX ADMIN — Medication 25 MILLIGRAM(S): at 13:19

## 2023-01-01 RX ADMIN — NYSTATIN CREAM 1 APPLICATION(S): 100000 CREAM TOPICAL at 05:45

## 2023-01-01 RX ADMIN — Medication 20 MILLIGRAM(S): at 10:49

## 2023-01-01 RX ADMIN — APIXABAN 5 MILLIGRAM(S): 2.5 TABLET, FILM COATED ORAL at 22:04

## 2023-01-01 RX ADMIN — DRONEDARONE 400 MILLIGRAM(S): 400 TABLET, FILM COATED ORAL at 17:31

## 2023-01-01 RX ADMIN — ERGOCALCIFEROL 50000 UNIT(S): 1.25 CAPSULE ORAL at 11:27

## 2023-01-01 RX ADMIN — NYSTATIN CREAM 1 APPLICATION(S): 100000 CREAM TOPICAL at 05:50

## 2023-01-01 RX ADMIN — POLYETHYLENE GLYCOL 3350 17 GRAM(S): 17 POWDER, FOR SOLUTION ORAL at 05:46

## 2023-01-01 RX ADMIN — NYSTATIN CREAM 1 APPLICATION(S): 100000 CREAM TOPICAL at 05:12

## 2023-01-01 RX ADMIN — NYSTATIN CREAM 1 APPLICATION(S): 100000 CREAM TOPICAL at 06:51

## 2023-01-01 RX ADMIN — NYSTATIN CREAM 1 APPLICATION(S): 100000 CREAM TOPICAL at 17:15

## 2023-01-01 RX ADMIN — SODIUM CHLORIDE 50 MILLILITER(S): 9 INJECTION, SOLUTION INTRAVENOUS at 09:16

## 2023-01-01 RX ADMIN — NYSTATIN CREAM 1 APPLICATION(S): 100000 CREAM TOPICAL at 06:22

## 2023-01-01 RX ADMIN — NYSTATIN CREAM 1 APPLICATION(S): 100000 CREAM TOPICAL at 06:05

## 2023-01-01 RX ADMIN — DRONEDARONE 400 MILLIGRAM(S): 400 TABLET, FILM COATED ORAL at 05:53

## 2023-01-01 RX ADMIN — APIXABAN 5 MILLIGRAM(S): 2.5 TABLET, FILM COATED ORAL at 05:49

## 2023-01-01 RX ADMIN — CEFTRIAXONE 100 MILLIGRAM(S): 500 INJECTION, POWDER, FOR SOLUTION INTRAMUSCULAR; INTRAVENOUS at 21:14

## 2023-01-01 RX ADMIN — Medication 100 MILLIEQUIVALENT(S): at 14:46

## 2023-01-01 RX ADMIN — POLYETHYLENE GLYCOL 3350 17 GRAM(S): 17 POWDER, FOR SOLUTION ORAL at 17:20

## 2023-01-01 RX ADMIN — APIXABAN 5 MILLIGRAM(S): 2.5 TABLET, FILM COATED ORAL at 05:45

## 2023-01-01 RX ADMIN — APIXABAN 5 MILLIGRAM(S): 2.5 TABLET, FILM COATED ORAL at 05:38

## 2023-01-01 RX ADMIN — ATORVASTATIN CALCIUM 80 MILLIGRAM(S): 80 TABLET, FILM COATED ORAL at 22:21

## 2023-01-01 RX ADMIN — Medication 25 MILLIGRAM(S): at 05:38

## 2023-01-01 RX ADMIN — Medication 25 MILLIGRAM(S): at 16:01

## 2023-01-01 RX ADMIN — DRONEDARONE 400 MILLIGRAM(S): 400 TABLET, FILM COATED ORAL at 18:53

## 2023-01-01 RX ADMIN — NYSTATIN CREAM 1 APPLICATION(S): 100000 CREAM TOPICAL at 18:53

## 2023-01-01 RX ADMIN — APIXABAN 5 MILLIGRAM(S): 2.5 TABLET, FILM COATED ORAL at 17:14

## 2023-01-01 RX ADMIN — Medication 25 MILLIGRAM(S): at 22:55

## 2023-01-01 RX ADMIN — FLUCONAZOLE 400 MILLIGRAM(S): 150 TABLET ORAL at 12:24

## 2023-01-01 RX ADMIN — DRONEDARONE 400 MILLIGRAM(S): 400 TABLET, FILM COATED ORAL at 05:49

## 2023-01-01 RX ADMIN — SENNA PLUS 2 TABLET(S): 8.6 TABLET ORAL at 21:53

## 2023-01-01 RX ADMIN — Medication 100 MILLIEQUIVALENT(S): at 12:19

## 2023-01-01 RX ADMIN — NYSTATIN CREAM 1 APPLICATION(S): 100000 CREAM TOPICAL at 18:23

## 2023-01-01 RX ADMIN — APIXABAN 5 MILLIGRAM(S): 2.5 TABLET, FILM COATED ORAL at 17:22

## 2023-01-01 RX ADMIN — PANTOPRAZOLE SODIUM 40 MILLIGRAM(S): 20 TABLET, DELAYED RELEASE ORAL at 11:28

## 2023-01-01 RX ADMIN — Medication 20 MILLIGRAM(S): at 05:13

## 2023-01-01 RX ADMIN — NYSTATIN CREAM 1 APPLICATION(S): 100000 CREAM TOPICAL at 17:20

## 2023-01-01 RX ADMIN — Medication 25 MILLIGRAM(S): at 06:07

## 2023-01-01 RX ADMIN — ATORVASTATIN CALCIUM 80 MILLIGRAM(S): 80 TABLET, FILM COATED ORAL at 21:53

## 2023-01-01 RX ADMIN — Medication 25 MILLIGRAM(S): at 06:22

## 2023-01-01 RX ADMIN — Medication 25 MILLIGRAM(S): at 05:49

## 2023-01-01 RX ADMIN — Medication 25 MILLIGRAM(S): at 21:53

## 2023-01-01 RX ADMIN — POLYETHYLENE GLYCOL 3350 17 GRAM(S): 17 POWDER, FOR SOLUTION ORAL at 05:50

## 2023-01-01 RX ADMIN — NYSTATIN CREAM 1 APPLICATION(S): 100000 CREAM TOPICAL at 05:38

## 2023-01-01 RX ADMIN — HALOPERIDOL DECANOATE 2.5 MILLIGRAM(S): 100 INJECTION INTRAMUSCULAR at 18:36

## 2023-01-01 RX ADMIN — ERGOCALCIFEROL 50000 UNIT(S): 1.25 CAPSULE ORAL at 12:46

## 2023-01-01 RX ADMIN — NYSTATIN CREAM 1 APPLICATION(S): 100000 CREAM TOPICAL at 06:35

## 2023-01-01 RX ADMIN — Medication 25 MILLIGRAM(S): at 14:30

## 2023-01-01 RX ADMIN — POLYETHYLENE GLYCOL 3350 17 GRAM(S): 17 POWDER, FOR SOLUTION ORAL at 05:13

## 2023-01-01 RX ADMIN — NYSTATIN CREAM 1 APPLICATION(S): 100000 CREAM TOPICAL at 17:31

## 2023-01-01 RX ADMIN — DRONEDARONE 400 MILLIGRAM(S): 400 TABLET, FILM COATED ORAL at 05:38

## 2023-01-01 RX ADMIN — PANTOPRAZOLE SODIUM 40 MILLIGRAM(S): 20 TABLET, DELAYED RELEASE ORAL at 13:15

## 2023-01-01 RX ADMIN — ENOXAPARIN SODIUM 75 MILLIGRAM(S): 100 INJECTION SUBCUTANEOUS at 18:22

## 2023-01-01 RX ADMIN — APIXABAN 5 MILLIGRAM(S): 2.5 TABLET, FILM COATED ORAL at 05:59

## 2023-01-01 RX ADMIN — DRONEDARONE 400 MILLIGRAM(S): 400 TABLET, FILM COATED ORAL at 06:07

## 2023-01-01 RX ADMIN — SENNA PLUS 2 TABLET(S): 8.6 TABLET ORAL at 22:55

## 2023-01-01 RX ADMIN — PANTOPRAZOLE SODIUM 40 MILLIGRAM(S): 20 TABLET, DELAYED RELEASE ORAL at 12:06

## 2023-01-01 RX ADMIN — Medication 20 MILLIGRAM(S): at 06:52

## 2023-01-01 RX ADMIN — DRONEDARONE 400 MILLIGRAM(S): 400 TABLET, FILM COATED ORAL at 06:52

## 2023-01-01 RX ADMIN — CEFTRIAXONE 100 MILLIGRAM(S): 500 INJECTION, POWDER, FOR SOLUTION INTRAMUSCULAR; INTRAVENOUS at 21:38

## 2023-01-01 RX ADMIN — HALOPERIDOL DECANOATE 2 MILLIGRAM(S): 100 INJECTION INTRAMUSCULAR at 15:31

## 2023-01-01 RX ADMIN — NYSTATIN CREAM 1 APPLICATION(S): 100000 CREAM TOPICAL at 17:04

## 2023-01-01 RX ADMIN — NYSTATIN CREAM 1 APPLICATION(S): 100000 CREAM TOPICAL at 06:23

## 2023-01-01 RX ADMIN — ATORVASTATIN CALCIUM 80 MILLIGRAM(S): 80 TABLET, FILM COATED ORAL at 21:38

## 2023-01-01 RX ADMIN — NYSTATIN CREAM 1 APPLICATION(S): 100000 CREAM TOPICAL at 06:00

## 2023-01-01 RX ADMIN — Medication 25 MILLIGRAM(S): at 21:38

## 2023-01-01 RX ADMIN — Medication 20 MILLIGRAM(S): at 05:59

## 2023-01-01 RX ADMIN — CEFTRIAXONE 100 MILLIGRAM(S): 500 INJECTION, POWDER, FOR SOLUTION INTRAMUSCULAR; INTRAVENOUS at 13:14

## 2023-01-01 RX ADMIN — APIXABAN 5 MILLIGRAM(S): 2.5 TABLET, FILM COATED ORAL at 17:03

## 2023-01-01 RX ADMIN — Medication 25 MILLIGRAM(S): at 10:53

## 2023-10-27 NOTE — ED PROVIDER NOTE - CLINICAL SUMMARY MEDICAL DECISION MAKING FREE TEXT BOX
Stroke code activated by triage nurse on arrival.  Patient with noted right-sided neurologic findings on exam however has a baseline of prior stroke and unknown if these are new or old findings.  We will proceed with imaging per stroke "protocol.  Patient was able to obtain noncontrast CT however was not able to sit still for CT angio and CT perfusion per protocol.  Tried 2 mg of IV Haldol but unable to calm patient enough to obtain further imaging of the table also brought back to emergency department leading to delays in further studies.  Not eligible for TNK as patient is actively on a DOAC with abnormal INR.  Also possible neurologic findings are baseline after prior stroke and change in mental status as noted is due to other toxic or metabolic or infectious causes so will evaluate for these as well.  Will require admission at this time for further evaluation.

## 2023-10-27 NOTE — ED PROVIDER NOTE - OBJECTIVE STATEMENT
87-year-old woman brought in by EMS from assisted living for reported fall with change in mental status.  Patient is providing coherent history at this time but is speaking.  Family not present in the emergency department to give further history about baseline.  Per EMS report change in mental status was noted about 1 hour ago.  Patient has a baseline history including stroke on both Eliquis and Plavix.

## 2023-10-27 NOTE — ED ADULT NURSE REASSESSMENT NOTE - NS ED NURSE REASSESS COMMENT FT1
LOUISA Mcbride notified that patient was unable to have CTA done due to agitation, no further orders given.

## 2023-10-27 NOTE — ED PROVIDER NOTE - NSICDXPASTMEDICALHX_GEN_ALL_CORE_FT
PAST MEDICAL HISTORY:  CVA (cerebrovascular accident)     Glaucoma     Hyperlipidemia     Hypertension     Other nonspecific abnormal finding of lung field

## 2023-10-27 NOTE — ED ADULT NURSE NOTE - NSFALLHARMRISKINTERV_ED_ALL_ED
Assistance OOB with selected safe patient handling equipment if applicable/Assistance with ambulation/Communicate risk of Fall with Harm to all staff, patient, and family/Monitor gait and stability/Monitor for mental status changes and reorient to person, place, and time, as needed/Provide visual cue: red socks, yellow wristband, yellow gown, etc/Reinforce activity limits and safety measures with patient and family/Toileting schedule using arm’s reach rule for commode and bathroom/Use of alarms - bed, stretcher, chair and/or video monitoring/Bed in lowest position, wheels locked, appropriate side rails in place/Call bell, personal items and telephone in reach/Instruct patient to call for assistance before getting out of bed/chair/stretcher/Non-slip footwear applied when patient is off stretcher/[place] to call system/Physically safe environment - no spills, clutter or unnecessary equipment/Purposeful Proactive Rounding/Room/bathroom lighting operational, light cord in reach

## 2023-10-27 NOTE — ED ADULT NURSE NOTE - ED STAT RN HANDOFF DETAILS
Patient A&Ox1 admitted to Telemetry Vital signs stable as documented, IV intact, no redness or swelling noted. Sinus tachycardia noted on tele monitor,  no acute distress noted. Endorsed to Day STEINBERG. Pending transfer to 5th floor

## 2023-10-28 NOTE — H&P ADULT - NSHPPHYSICALEXAM_GEN_ALL_CORE
GENERAL: mild agitation   HEAD:  Atraumatic, Normocephalic  EYES:  conjunctiva and sclera clear  NECK: Supple, No JVD, Normal thyroid  CHEST/LUNG: Clear to auscultation. No rales, rhonchi, wheezing, or rubs  HEART: Regular rate and rhythm; No murmurs, rubs, or gallops  ABDOMEN: Soft, Nontender, Nondistended; Bowel sounds present  NERVOUS SYSTEM:  Alert & Oriented X2(place and person),  RLE & RUE 3/5, Sensation intact   EXTREMITIES:  2+ Peripheral Pulses, No clubbing, cyanosis, or edema  SKIN: warm dry

## 2023-10-28 NOTE — SWALLOW BEDSIDE ASSESSMENT ADULT - CONSISTENCIES ADMINISTERED
applesauce+lonnie crackers, x3tsp/minced & moist applesauce, x4 tsp/pureed lonnie cracker/regular solid water, ~3oz/thin liquid

## 2023-10-28 NOTE — SWALLOW BEDSIDE ASSESSMENT ADULT - COMMENTS
Pt AA+Ox2, responsive to all queries and simple, one-step commands, HOB elevated to 90°. Pt was on RA w/ increased WOB, attempted to check satO2 w/ difficulty obtaining adequate perfusion; IDRIS Rosales came in, device inconsistently indicating 79-88%. RN replaced n/c w/ O2 at 2LPM. At the end of session, pt on 100%, RN made aware. Pt benefited from liquid wash to clear oral residue.

## 2023-10-28 NOTE — SWALLOW BEDSIDE ASSESSMENT ADULT - ORAL PREPARATORY PHASE
Reduced oral grading/Anterior loss of bolus Reduced oral grading bolus holding/Reduced oral grading/Decreased mastication ability Reduced oral grading/Decreased mastication ability

## 2023-10-28 NOTE — SWALLOW BEDSIDE ASSESSMENT ADULT - PHARYNGEAL PHASE
Delayed pharyngeal swallow/Decreased laryngeal elevation audible/ gulp swallow/Delayed pharyngeal swallow/Decreased laryngeal elevation

## 2023-10-28 NOTE — H&P ADULT - ASSESSMENT
This is an 86 y/o female from [facility] with pmhx of Afib, arthritis, HTN, CVA presenting to the ED for fall and AMS as per NH papers. Patient is a poor historian, answers " i don't know" to every question, collateral history was taken form ED attending chart and NH papers. Patient states she has right sided weakness after previous CVA accident. Endorses burning on urination.  Admitted for fall and AMS       In ED  vitals: BP: 121/85, HR: 114, T: 36.4  S/P Rocephin, haloperidol 2 mg and 2.5mg   Ua(+), f/u Ucx   CTH age indeterminant left MCA  Trop 131

## 2023-10-28 NOTE — H&P ADULT - PROBLEM SELECTOR PLAN 3
hx CVA on plavix and atorvastatin   unknown residual deficit  R sided upper and lower extremity weakness, slurred speech    code stroke in ED with NIHSS 6  failed dysphagia   CTH age indeterminant left MCA  Tele monitoring  F/U Echo with bubble study  Pt not rTPA candidate on DOAC  Monitor BP - allow permissive htn x24hr from last known normal  PT consult  Dysphagia screen failed, f/u speech and swallow eval   Neuro checks q4  Neuro Consulted Dr. Preston

## 2023-10-28 NOTE — PATIENT PROFILE ADULT - FALL HARM RISK - HARM RISK INTERVENTIONS

## 2023-10-28 NOTE — H&P ADULT - HISTORY OF PRESENT ILLNESS
This is an 86 y/o female from [facility] with pmhx of Afib, arthritis, HTN, CVA presenting to the ED for fall and AMS as per NH papers. Patient is a poor historian, answers " i don't know" to every question, collateral history was taken form ED attending chart and NH papers. Patient states she has right sided weakness after previous CVA accident (slurred speech noted), however unknown true baseline, code stroke in ED with NIHSS score of 6. Endorses burning on urination.  Denies any other complaint. Does not know why she is at hospital nor cano she remember a fall.       In ED  vitals: BP: 121/85, HR: 114, T: 36.4  S/P Rocephin, haloperidol 2 mg and 2.5mg   Ua(+), f/u Ucx   CTH age indeterminant left MCA  Trop 131  This is an 86 y/o female from [facility] with pmhx of [place] Afib, arthritis, HTN, CVA presenting to the ED for fall and AMS as per NH papers. Patient is a poor historian, answers " i don't know" to every question, collateral history was taken form ED attending chart and NH papers. Patient states she has right sided weakness after previous CVA accident (slurred speech noted), however unknown true baseline, code stroke in ED with NIHSS score of 6. Endorses burning on urination.  Denies any other complaint. Does not know why she is at hospital nor cano she remember a fall.       In ED  vitals: BP: 121/85, HR: 114, T: 36.4  S/P Rocephin, haloperidol 2 mg and 2.5mg   Ua(+), f/u Ucx   CTH age indeterminant left MCA  Trop 131

## 2023-10-28 NOTE — H&P ADULT - PROBLEM SELECTOR PLAN 1
p/w AMS may be 2/2 UTI vs new CVA   UA (+)   CTH age indeterminant left MCA  f/u Ucx  c/w Rocephin   Neuro consulted: Dr. Preston

## 2023-10-29 NOTE — PROGRESS NOTE ADULT - SUBJECTIVE AND OBJECTIVE BOX
Date of Service 10-29-23 @ 10:39    CHIEF COMPLAINT:Patient is a 87y old  Female who presents with a chief complaint of Fall and AMS (28 Oct 2023 12:05)    	  REVIEW OF SYSTEMS:  CONSTITUTIONAL: No fever, weight loss, or fatigue  EYES: No eye pain, visual disturbances, or discharge  ENT:  No difficulty hearing, tinnitus, vertigo; No sinus or throat pain  NECK: No pain or stiffness  RESPIRATORY: No cough, wheezing, chills or hemoptysis; No Shortness of Breath  CARDIOVASCULAR: No chest pain, palpitations, passing out, dizziness, or leg swelling  GASTROINTESTINAL: No abdominal or epigastric pain. No nausea, vomiting, or hematemesis; No diarrhea or constipation. No melena or hematochezia.  GENITOURINARY: No dysuria, frequency, hematuria, or incontinence  NEUROLOGICAL: No headaches, memory loss, loss of strength, numbness, or tremors  SKIN: No itching, burning, rashes, or lesions   LYMPH Nodes: No enlarged glands  ENDOCRINE: No heat or cold intolerance; No hair loss  MUSCULOSKELETAL: No joint pain or swelling; No muscle, back, or extremity pain  PSYCHIATRIC: No depression, anxiety, mood swings, or difficulty sleeping  HEME/LYMPH: No easy bruising, or bleeding gums  ALLERGY AND IMMUNOLOGIC: No hives or eczema	    [ ] All others negative	  [ ] Unable to obtain    PHYSICAL EXAM:  T(C): 36.5 (10-29-23 @ 07:14), Max: 36.8 (10-28-23 @ 15:43)  HR: 86 (10-29-23 @ 07:14) (85 - 101)  BP: 162/94 (10-29-23 @ 07:14) (136/72 - 167/95)  RR: 18 (10-29-23 @ 07:14) (18 - 18)  SpO2: 100% (10-29-23 @ 07:14) (94% - 100%)  Wt(kg): --  I&O's Summary    28 Oct 2023 07:01  -  29 Oct 2023 07:00  --------------------------------------------------------  IN: 0 mL / OUT: 700 mL / NET: -700 mL        Appearance: Normal	  HEENT:   Normal oral mucosa, PERRL, EOMI	  Lymphatic: No lymphadenopathy  Cardiovascular: Normal S1 S2, No JVD, No murmurs, No edema  Respiratory: Lungs clear to auscultation	  Psychiatry: A & O x 3, Mood & affect appropriate  Gastrointestinal:  Soft, Non-tender, + BS	  Skin: No rashes, No ecchymoses, No cyanosis	  Neurologic: Non-focal  Extremities: Normal range of motion, No clubbing, cyanosis or edema  Vascular: Peripheral pulses palpable 2+ bilaterally    MEDICATIONS  (STANDING):  apixaban 5 milliGRAM(s) Oral two times a day  atorvastatin 20 milliGRAM(s) Oral at bedtime  cefTRIAXone   IVPB 1000 milliGRAM(s) IV Intermittent every 24 hours  nystatin Powder 1 Application(s) Topical every 12 hours  pantoprazole  Injectable 40 milliGRAM(s) IV Push daily      TELEMETRY: 	nsr,sinus tach     	  	  LABS:	 	          Troponin I, High Sensitivity Result: 356.0 ng/L (10-28 @ 10:40)  Troponin I, High Sensitivity Result: 483.0 ng/L (10-28 @ 06:10)  Troponin I, High Sensitivity Result: 131.3 ng/L (10-27 @ 15:25)                            12.8   7.30  )-----------( 251      ( 29 Oct 2023 07:50 )             38.1     10-29    146<H>  |  109<H>  |  8   ----------------------------<  86  3.3<L>   |  31  |  0.63    Ca    8.0<L>      29 Oct 2023 07:50  Phos  3.3     10-29  Mg     1.9     10-29    TPro  6.5  /  Alb  3.1<L>  /  TBili  1.1  /  DBili  x   /  AST  21  /  ALT  18  /  AlkPhos  196<H>  10-27    proBNP:   Lipid Profile: Cholesterol 126  LDL --  HDL 63  TG 67  Ldl calc 50  Ratio --    HgA1c:   TSH: Thyroid Stimulating Hormone, Serum: 1.19 uU/mL (10-29 @ 07:50)      	    Culture - Urine (10.27.23 @ 17:20)   Specimen Source: Clean Catch Clean Catch (Midstream)  Culture Results:   >100,000 CFU/ml Gram Negative Rods     Date of Service 10-29-23 @ 10:39    CHIEF COMPLAINT:Patient is a 87y old  Female who presents with a chief complaint of Fall and AMS.Pt awake and alert this am.    	  REVIEW OF SYSTEMS:  CONSTITUTIONAL: No fever, weight loss, or fatigue  EYES: No eye pain, visual disturbances, or discharge  ENT:  No difficulty hearing, tinnitus, vertigo; No sinus or throat pain  NECK: No pain or stiffness  RESPIRATORY: No cough, wheezing, chills or hemoptysis; No Shortness of Breath  CARDIOVASCULAR: No chest pain, palpitations, passing out, dizziness, or leg swelling  GASTROINTESTINAL: No abdominal or epigastric pain. No nausea, vomiting, or hematemesis; No diarrhea or constipation. No melena or hematochezia.  GENITOURINARY: No dysuria, frequency, hematuria, or incontinence  NEUROLOGICAL: No headaches, memory loss, loss of strength, numbness, or tremors  SKIN: No itching, burning, rashes, or lesions   LYMPH Nodes: No enlarged glands  ENDOCRINE: No heat or cold intolerance; No hair loss  MUSCULOSKELETAL: No joint pain or swelling; No muscle, back, or extremity pain  PSYCHIATRIC: No depression, anxiety, mood swings, or difficulty sleeping  HEME/LYMPH: No easy bruising, or bleeding gums  ALLERGY AND IMMUNOLOGIC: No hives or eczema	    PHYSICAL EXAM:  T(C): 36.5 (10-29-23 @ 07:14), Max: 36.8 (10-28-23 @ 15:43)  HR: 86 (10-29-23 @ 07:14) (85 - 101)  BP: 162/94 (10-29-23 @ 07:14) (136/72 - 167/95)  RR: 18 (10-29-23 @ 07:14) (18 - 18)  SpO2: 100% (10-29-23 @ 07:14) (94% - 100%)  Wt(kg): --  I&O's Summary    28 Oct 2023 07:01  -  29 Oct 2023 07:00  --------------------------------------------------------  IN: 0 mL / OUT: 700 mL / NET: -700 mL        Appearance: Normal	  HEENT:   Normal oral mucosa, PERRL, EOMI	  Lymphatic: No lymphadenopathy  Cardiovascular: Normal S1 S2, No JVD, No murmurs, No edema  Respiratory: Lungs clear to auscultation	  Gastrointestinal:  Soft, Non-tender, + BS	  Skin: No rashes, No ecchymoses, No cyanosis	  Extremities: Normal range of motion, No clubbing, cyanosis or edema  Vascular: Peripheral pulses palpable 2+ bilaterally    MEDICATIONS  (STANDING):  apixaban 5 milliGRAM(s) Oral two times a day  atorvastatin 20 milliGRAM(s) Oral at bedtime  cefTRIAXone   IVPB 1000 milliGRAM(s) IV Intermittent every 24 hours  nystatin Powder 1 Application(s) Topical every 12 hours  pantoprazole  Injectable 40 milliGRAM(s) IV Push daily      TELEMETRY: 	nsr,sinus tach     	  	  LABS:	 	          Troponin I, High Sensitivity Result: 356.0 ng/L (10-28 @ 10:40)  Troponin I, High Sensitivity Result: 483.0 ng/L (10-28 @ 06:10)  Troponin I, High Sensitivity Result: 131.3 ng/L (10-27 @ 15:25)                            12.8   7.30  )-----------( 251      ( 29 Oct 2023 07:50 )             38.1     10-29    146<H>  |  109<H>  |  8   ----------------------------<  86  3.3<L>   |  31  |  0.63    Ca    8.0<L>      29 Oct 2023 07:50  Phos  3.3     10-29  Mg     1.9     10-29    TPro  6.5  /  Alb  3.1<L>  /  TBili  1.1  /  DBili  x   /  AST  21  /  ALT  18  /  AlkPhos  196<H>  10-27    proBNP:   Lipid Profile: Cholesterol 126  LDL --  HDL 63  TG 67  Ldl calc 50  Ratio --    HgA1c:   TSH: Thyroid Stimulating Hormone, Serum: 1.19 uU/mL (10-29 @ 07:50)      	    Culture - Urine (10.27.23 @ 17:20)   Specimen Source: Clean Catch Clean Catch (Midstream)  Culture Results:   >100,000 CFU/ml Gram Negative Rods

## 2023-10-29 NOTE — PROGRESS NOTE ADULT - ASSESSMENT
This is an 86 y/o female from [facility] with pmhx of Afib, arthritis, HTN, CVA presenting to the ED for fall and AMS as per NH papers. Patient is a poor historian, answers " i don't know" to every question, collateral history was taken form ED attending chart and NH papers. Patient states she has right sided weakness after previous CVA accident. Endorses burning on urination.  Admitted for fall and AMS       In ED  vitals: BP: 121/85, HR: 114, T: 36.4  S/P Rocephin, haloperidol 2 mg and 2.5mg   Ua(+), f/u Ucx   CTH age indeterminant left MCA  Trop 131

## 2023-10-29 NOTE — PROGRESS NOTE ADULT - ASSESSMENT
The neurological examination and MRI scan support a new ischemic infarct. She appears to have suffered from fgfailed anti-platelet treatment; sinus rhythm so far but needs review for atrial fibrillation causing embolic stroke requiring apixaban.

## 2023-10-29 NOTE — PROGRESS NOTE ADULT - SUBJECTIVE AND OBJECTIVE BOX
PGY-1 Progress Note discussed with attending    PAGER #: [phone]] TILL 5:00 PM  PLEASE CONTACT ON CALL TEAM:  - On Call Team (Please refer to Marck) FROM 5:00 PM - 8:30PM  - Nightfloat Team FROM 8:30 -7:30 AM      INTERVAL HPI/OVERNIGHT EVENTS: Patient examined at bedside. Awake and alert A&O X1-2. Offers no complaints.      REVIEW OF SYSTEMS:  CONSTITUTIONAL: No fever, weight loss, or fatigue  RESPIRATORY: No cough, wheezing, chills or hemoptysis; No shortness of breath  CARDIOVASCULAR: No chest pain, palpitations, dizziness, or leg swelling  GASTROINTESTINAL: No abdominal pain. No nausea, vomiting, or hematemesis; No diarrhea or constipation. No melena or hematochezia.  GENITOURINARY: No dysuria or hematuria, urinary frequency  NEUROLOGICAL: No headaches, memory loss, loss of strength, numbness, or tremors  SKIN: No itching, burning, rashes, or lesions     Vital Signs Last 24 Hrs  T(C): 36.5 (29 Oct 2023 07:14), Max: 36.8 (28 Oct 2023 15:43)  T(F): 97.7 (29 Oct 2023 07:14), Max: 98.2 (28 Oct 2023 15:43)  HR: 86 (29 Oct 2023 07:14) (85 - 101)  BP: 162/94 (29 Oct 2023 07:14) (147/91 - 167/95)  BP(mean): --  RR: 18 (29 Oct 2023 07:14) (18 - 18)  SpO2: 100% (29 Oct 2023 07:14) (94% - 100%)    Parameters below as of 29 Oct 2023 07:14  Patient On (Oxygen Delivery Method): room air        PHYSICAL EXAMINATION:  GENERAL: NAD, well built  HEAD:  Atraumatic, Normocephalic  EYES:  conjunctiva and sclera clear  NECK: Supple, No JVD, Normal thyroid  CHEST/LUNG: Clear to auscultation. No rales, rhonchi, wheezing, or rubs  HEART: Regular rate and rhythm; No murmurs, rubs, or gallops  ABDOMEN: Soft, Nontender, Nondistended; Bowel sounds present  NERVOUS SYSTEM:  Alert & Oriented X3,    EXTREMITIES:  2+ Peripheral Pulses, No clubbing, cyanosis, or edema  SKIN: warm dry                          12.8   7.30  )-----------( 251      ( 29 Oct 2023 07:50 )             38.1     10-29    146<H>  |  109<H>  |  8   ----------------------------<  86  3.3<L>   |  31  |  0.63    Ca    8.0<L>      29 Oct 2023 07:50  Phos  3.3     10-29  Mg     1.9     10-29    TPro  6.5  /  Alb  3.1<L>  /  TBili  1.1  /  DBili  x   /  AST  21  /  ALT  18  /  AlkPhos  196<H>  10-27    LIVER FUNCTIONS - ( 27 Oct 2023 15:25 )  Alb: 3.1 g/dL / Pro: 6.5 g/dL / ALK PHOS: 196 U/L / ALT: 18 U/L DA / AST: 21 U/L / GGT: x               PT/INR - ( 27 Oct 2023 15:25 )   PT: 17.8 sec;   INR: 1.58 ratio         PTT - ( 27 Oct 2023 15:25 )  PTT:41.7 sec    CAPILLARY BLOOD GLUCOSE      RADIOLOGY & ADDITIONAL TESTS:

## 2023-10-29 NOTE — PROGRESS NOTE ADULT - ASSESSMENT
88 y/o female from [facility] with pmhx of Parox Afib, arthritis, HTN, CVA presenting to the ED for fall and AMS as per NH papers,UIT and borderline troponins.  1.Tele monitoring.  2.Neurology eval noted, await MRI..  3.Echocardiogram.  4.Speech and swallow eval appreciated.  5.CVA, PAF-lovenox cand be changed to eliquis, add lopressor 25mg q8..  6.UTI-ABX.F/U urine cx.  7.HTN  8.PPI.  9.AMS due to UTI.

## 2023-10-29 NOTE — PROGRESS NOTE ADULT - PROBLEM SELECTOR PLAN 3
hx CVA on plavix and atorvastatin   unknown residual deficit  R sided upper and lower extremity weakness, slurred speech    code stroke in ED with NIHSS 6  CTH age indeterminant left MCA  Tele monitoring  F/U Echo with bubble study  Pt not rTPA candidate on DOAC  PT consult  Dysphagia screen failed, speech and swallow eval- soft and bite sized   Neuro checks q4  Neuro Consulted Dr. Preston

## 2023-10-29 NOTE — PROGRESS NOTE ADULT - PROBLEM SELECTOR PLAN 1
p/w AMS may be 2/2 UTI vs new CVA   UA (+), UCx Ecoli  CTH age indeterminant left MCA  pending MRI  c/w Rocephin   Neuro consulted: Dr. Preston

## 2023-10-29 NOTE — PROGRESS NOTE ADULT - SUBJECTIVE AND OBJECTIVE BOX
INTERVAL HPI/OVERNIGHT EVENTS:  No new complaints.   she is 87-year-old and resident of [facility].  She was found on the floor and there were no witnesses.  She was unable to give a history.  But later said she had right-sided weakness from a previous stroke.  She also had burning on urination.  HEALTH ISSUES - PROBLEM Dx:  AMS (altered mental status)    Acute UTI    CVA (cerebrovascular accident)    NSTEMI (non-ST elevation myocardial infarction)    Chronic atrial fibrillation    HTN (hypertension)    Prophylactic measure            MEDICATIONS  (STANDING):  apixaban 5 milliGRAM(s) Oral two times a day  atorvastatin 20 milliGRAM(s) Oral at bedtime  cefTRIAXone   IVPB 1000 milliGRAM(s) IV Intermittent every 24 hours  metoprolol tartrate 25 milliGRAM(s) Oral every 8 hours  nystatin Powder 1 Application(s) Topical every 12 hours  pantoprazole  Injectable 40 milliGRAM(s) IV Push daily    MEDICATIONS  (PRN):      Allergies    No Known Allergies    Intolerances        REVIEW OF SYSTEMS        Vital Signs Last 24 Hrs  T(C): 36.6 (29 Oct 2023 15:14), Max: 36.7 (29 Oct 2023 11:04)  T(F): 97.9 (29 Oct 2023 15:14), Max: 98.1 (29 Oct 2023 11:04)  HR: 72 (29 Oct 2023 15:14) (72 - 101)  BP: 175/99 (29 Oct 2023 15:14) (132/86 - 175/99)  BP(mean): --  RR: 18 (29 Oct 2023 15:14) (18 - 18)  SpO2: 98% (29 Oct 2023 15:14) (95% - 100%)    Parameters below as of 29 Oct 2023 15:14  Patient On (Oxygen Delivery Method): room air        PHYSICAL EXAM:  Constitutional: awake and alert.  HEENT: PERRLA, EOMI,   Neck: Supple.  Respiratory: Breath sounds are clear bilaterally  Cardiovascular: S1 and S2, / irregular rhythm  Gastrointestinal: soft, nontender  Extremities:  no edema  Vascular: Caritid Bruit - no  Musculoskeletal: no joint swelling/tenderness, no abnormal movements  Skin: No rashes    Neurological exam:  HF: A x O x 1. Appropriately interactive, normal affect. Speech nonfluent, No Aphasia or paraphasic errors. Naming /repetition abnormal   CN: EDEN, EOMI, VF left hemianopsia, facial sensation normal, right NLFD, tongue midline, Palate moves equally, SCM equal bilaterally  Motor: No pronator drift, Strength 1/5 LUE and 4/5 RUE 3/5 RLE and 4/5LLE in normal bulk and spastic RUE, no tremor, rigidity or bradykinesia.    Sens: Intact to light touch / PP/ VS/ JS    Reflexes: Brisker right side and Babinski R  Coord:  No FNFA, dysmetria, YARI intact   Gait/Balance: Unable to walk    NIHSS: 13 MRS 4  1A: Level of consciousness       +1= Arouses to minor stimulation  1B: Ask month and age       +2= 0 questions right  1C: "Blink eyes" and "Squeeze Hands"       0= Performs both  2: Horizontal EOMs       0= Normal  3: Visual fields       +2= Complete hemianopia  4: Facial palsy (use grimace if obtunded)       +1= Minor paralysis (flat NLF, smile asymmetry)  5A: Left arm motor drift (count out loud and use fingers to show count)       +1= Drift but doesn't hit bed  5B: Right arm motor drift       +2= Some effort against gravity  6A: Left leg motor drift       +1= Drift but doesn't hit bed  6B: Right leg motor drift       +1= Drift but doesn't hit bed  7: Limb ataxia (FNF/heel-shin)       0= No ataxia  8: Sensation       0= Normal, no sensory loss  9: Language/aphasia- describe the scene (on shanthi); name the items; read the sentences (on shanthi)       +1= mild-moderate aphaisa: some obvious changes without significant limitation  10: Dysarthria- read the words       +1= mild-moderate dysarthria: slurring but can be understood  11: Extinction/inattention       0= No abnormality      LABS:                        12.8   7.30  )-----------( 251      ( 29 Oct 2023 07:50 )             38.1     10-29    146<H>  |  109<H>  |  8   ----------------------------<  86  3.3<L>   |  31  |  0.63    Ca    8.0<L>      29 Oct 2023 07:50  Phos  3.3     10-29  Mg     1.9     10-29        Urinalysis Basic - ( 29 Oct 2023 07:50 )    Color: x / Appearance: x / SG: x / pH: x  Gluc: 86 mg/dL / Ketone: x  / Bili: x / Urobili: x   Blood: x / Protein: x / Nitrite: x   Leuk Esterase: x / RBC: x / WBC x   Sq Epi: x / Non Sq Epi: x / Bacteria: x        RADIOLOGY & ADDITIONAL TESTS:  < from: MR Head No Cont (10.29.23 @ 13:17) >  ACC: [id number] EXAM:  MR BRAIN   ORDERED BY: MANOLO KNOTT     PROCEDURE DATE:  10/29/2023          INTERPRETATION:  .    CLINICAL INFORMATION: Altered mental status.    TECHNIQUE: Multiplanar multisequential MRI of the brain was acquired   without the administration of IV gadolinium.    COMPARISON: Prior CT examination of the head dated 10/27/2023. No prior   brain MRI studies are available for comparison    FINDINGS: A curvilinear area of restricted diffusion is seen within the   left posteriorfrontal lobe with extension along the left corticospinal   tract. Associated T2/FLAIR hyperintense signal is also seen within the   same distribution, compatible with cytotoxic edema. Susceptibility   artifact is also seen within the left posterior frontal lobe for which a   petechial hemorrhagic transformation is a possibility.    Additional chronic lacunar infarcts are noted within the bilateral basal   ganglia and bilateral thalami.    Multiple extensive patchy confluent nonspecific T2/FLAIR hyperintense   signal changes are noted throughout the bihemispheric white matter and   carlos without associated mass effect or restricted diffusion.    Ventriculomegaly appears unchanged. No abnormal extra-axial fluid   collections are noted. Flow-voids are noted throughout the major   intracranial vessels, on the T2 weighted images, consistent with their   patency. The sellar location appears unremarkable.    The paranasal sinuses and tympanomastoid cavities are clear. Calvarial   signal appears unremarkable. The orbits appear within normal limits.    IMPRESSION: Acute/subacute infarction within the posterior high left   frontal lobe with additional areas of acute/subacute ischemia along the   left corticospinal tract. No gross hemorrhagic transformation. Petechial   hemorrhagic transformation within the posterior left frontal lobe is a   possibility.    Similar-appearing extensive chronic white matter microvascular type   changes and additional chronic lacunar infarcts, as discussed.    --- End of Report ---            FELIX WHITESIDE MD; Attending Radiologist  This document has been electronically signed. Oct 29 2023  1:26PM    < end of copied text >

## 2023-10-30 NOTE — PROGRESS NOTE ADULT - PROBLEM SELECTOR PLAN 1
p/w AMS may be 2/2 UTI vs new CVA   UA (+), UCx Ecoli  CTH age indeterminant left MCA  pending MRI  c/w Rocephin   Neuro consulted: Dr. Preston p/w AMS may be 2/2 UTI vs new CVA   UA (+), UCx Ecoli  CTH age indeterminant left MCA  MRI: Acute/subacute infarction within the posterior high left   frontal lobe and along the left corticospinal tract. No gross hemorrhagic transformation. Petechial   hemorrhagic transformation within the posterior left frontal lobe is a   possibility.  Similar-appearing extensive chronic white matter microvascular type   changes and additional chronic lacunar infarcts,  c/w Rocephin   Neuro consulted: Dr. Preston hx CVA on plavix and atorvastatin   unknown residual deficit  R sided upper and lower extremity weakness, slurred speech    code stroke in ED with NIHSS 6  CTH age indeterminant left MCA  MRI: Acute/subacute infarction within the posterior high left frontal lobe and along the left corticospinal tract. No gross hemorrhagic transformation. Petechial hemorrhagic transformation within the posterior left frontal lobe is a   possibility   Tele monitoring  Echo with bubble study: Normal LVEF 62 %, PFO/ ASD  Pt not rTPA candidate on DOAC  PT consult  Dysphagia screen failed, speech and swallow eval- soft and bite sized   Neuro checks q4  Neuro Consulted Dr. Preston hx CVA on plavix and atorvastatin   unknown residual deficit  R sided upper and lower extremity weakness, slurred speech    code stroke in ED with NIHSS 6  CTH age indeterminant left MCA  MRI: Acute/subacute infarction within the posterior high left frontal lobe and along the left corticospinal tract. No gross hemorrhagic transformation. Petechial hemorrhagic transformation within the posterior left frontal lobe is a   possibility   Tele monitoring  Echo with bubble study: Normal LVEF 62 %, PFO/ ASD  Pt not rTPA candidate on DOAC  PT consult- rec long term care  Dysphagia screen failed, speech and swallow eval- soft and bite sized   Neuro checks q4  Neuro Consulted Dr. Preston

## 2023-10-30 NOTE — PROGRESS NOTE ADULT - SUBJECTIVE AND OBJECTIVE BOX
Date of Service 10-30-23 @ 10:27    CHIEF COMPLAINT:Patient is a 87y old  Female who presents with a chief complaint of Fall and AMS.Pt is short of breath this AM.    	  REVIEW OF SYSTEMS:  CONSTITUTIONAL: No fever, weight loss, or fatigue  EYES: No eye pain, visual disturbances, or discharge  ENT:  No difficulty hearing, tinnitus, vertigo; No sinus or throat pain  NECK: No pain or stiffness  RESPIRATORY: No cough, wheezing, chills or hemoptysis; + Shortness of Breath  CARDIOVASCULAR: No chest pain, palpitations, passing out, dizziness, or leg swelling  GASTROINTESTINAL: No abdominal or epigastric pain. No nausea, vomiting, or hematemesis; No diarrhea or constipation. No melena or hematochezia.  GENITOURINARY: No dysuria, frequency, hematuria, or incontinence  NEUROLOGICAL: No headaches, memory loss, loss of strength, numbness, or tremors  SKIN: No itching, burning, rashes, or lesions   LYMPH Nodes: No enlarged glands  ENDOCRINE: No heat or cold intolerance; No hair loss  MUSCULOSKELETAL: No joint pain or swelling; No muscle, back, or extremity pain  PSYCHIATRIC: No depression, anxiety, mood swings, or difficulty sleeping  HEME/LYMPH: No easy bruising, or bleeding gums  ALLERGY AND IMMUNOLOGIC: No hives or eczema	        PHYSICAL EXAM:  T(C): 36.3 (10-30-23 @ 07:58), Max: 37 (10-29-23 @ 19:58)  HR: 77 (10-30-23 @ 07:58) (72 - 87)  BP: 139/88 (10-30-23 @ 07:58) (132/86 - 175/99)  RR: 19 (10-30-23 @ 07:58) (18 - 19)  SpO2: 100% (10-30-23 @ 07:58) (92% - 100%)  Wt(kg): --  I&O's Summary    29 Oct 2023 07:01  -  30 Oct 2023 07:00  --------------------------------------------------------  IN: 200 mL / OUT: 875 mL / NET: -675 mL        Appearance: Normal	  HEENT:   Normal oral mucosa, PERRL, EOMI	  Lymphatic: No lymphadenopathy  Cardiovascular: Normal S1 S2, No JVD, No murmurs, No edema  Respiratory: Lungs clear to auscultation	  Psychiatry: A & O x 3, Mood & affect appropriate  Gastrointestinal:  Soft, Non-tender, + BS	  Skin: No rashes, No ecchymoses, No cyanosis	  Neurologic: Non-focal  Extremities: Normal range of motion, No clubbing, cyanosis or edema  Vascular: Peripheral pulses palpable 2+ bilaterally    MEDICATIONS  (STANDING):  apixaban 5 milliGRAM(s) Oral two times a day  atorvastatin 20 milliGRAM(s) Oral at bedtime  cefTRIAXone   IVPB 1000 milliGRAM(s) IV Intermittent every 24 hours  ergocalciferol 55063 Unit(s) Oral <User Schedule>  furosemide   Injectable 20 milliGRAM(s) IV Push once  melatonin 3 milliGRAM(s) Oral at bedtime  metoprolol tartrate 25 milliGRAM(s) Oral every 8 hours  nystatin Powder 1 Application(s) Topical every 12 hours  pantoprazole  Injectable 40 milliGRAM(s) IV Push daily      TELEMETRY: nsr,sinus tach,pac's	    	  	  LABS:	 	    Troponin I, High Sensitivity Result: 356.0 ng/L (10-28 @ 10:40)  Troponin I, High Sensitivity Result: 483.0 ng/L (10-28 @ 06:10)  Troponin I, High Sensitivity Result: 131.3 ng/L (10-27 @ 15:25)                            13.4   8.10  )-----------( 291      ( 30 Oct 2023 06:50 )             39.7     10-30    144  |  109<H>  |  7   ----------------------------<  94  3.9   |  28  |  0.61    Ca    7.9<L>      30 Oct 2023 06:50  Phos  3.3     10-30  Mg     1.9     10-30    TPro  6.3  /  Alb  2.5<L>  /  TBili  0.7  /  DBili  x   /  AST  22  /  ALT  18  /  AlkPhos  195<H>  10-30      Lipid Profile: Cholesterol 126  LDL --  HDL 63  TG 67  Ldl calc 50  Ratio --    HgA1c:   TSH: Thyroid Stimulating Hormone, Serum: 1.19 uU/mL (10-29 @ 07:50)      	    ACC: [id number] EXAM:  MR BRAIN   ORDERED BY: MANOLO KNOTT     PROCEDURE DATE:  10/29/2023          INTERPRETATION:  .    CLINICAL INFORMATION: Altered mental status.    TECHNIQUE: Multiplanar multisequential MRI of the brain was acquired   without the administration of IV gadolinium.    COMPARISON: Prior CT examination of the head dated 10/27/2023. No prior   brain MRI studies are available for comparison    FINDINGS: A curvilinear area of restricted diffusion is seen within the   left posteriorfrontal lobe with extension along the left corticospinal   tract. Associated T2/FLAIR hyperintense signal is also seen within the   same distribution, compatible with cytotoxic edema. Susceptibility   artifact is also seen within the left posterior frontal lobe for which a   petechial hemorrhagic transformation is a possibility.    Additional chronic lacunar infarcts are noted within the bilateral basal   ganglia and bilateral thalami.    Multiple extensive patchy confluent nonspecific T2/FLAIR hyperintense   signal changes are noted throughout the bihemispheric white matter and   carlos without associated mass effect or restricted diffusion.    Ventriculomegaly appears unchanged. No abnormal extra-axial fluid   collections are noted. Flow-voids are noted throughout the major   intracranial vessels, on the T2 weighted images, consistent with their   patency. The sellar location appears unremarkable.    The paranasal sinuses and tympanomastoid cavities are clear. Calvarial   signal appears unremarkable. The orbits appear within normal limits.    IMPRESSION: Acute/subacute infarction within the posterior high left   frontal lobe with additional areas of acute/subacute ischemia along the   left corticospinal tract. No gross hemorrhagic transformation. Petechial   hemorrhagic transformation within the posterior left frontal lobe is a   possibility.    Similar-appearing extensive chronic white matter microvascular type   changes and additional chronic lacunar infarcts, as discussed.      FELIX WHITESIDE MD; Attending Radiologist  This document has been electronically signed. Oct 29 2023  1:26PM

## 2023-10-30 NOTE — PROGRESS NOTE ADULT - PROBLEM SELECTOR PLAN 6
on furosemide 20mg (LE edema)  started lopressor on furosemide 20mg (LE edema)  on lopressor on furosemide 20mg (LE edema)  s/p iv lasix 20mg   on lopressor

## 2023-10-30 NOTE — PROGRESS NOTE ADULT - ASSESSMENT
88 y/o female from [place] with pmhx of Parox Afib, arthritis, HTN, CVA presenting to the ED for fall and AMS as per NH papers,UIT and borderline troponins,+CVA's.  1.Tele monitoring.  2.Neurology f/u.?Eliquis failure, vs hypercoagg state ie malignancy.  3.Echocardiogram.  4.SOB-Lasix 20mg ivx1, CT chest non-contrast.  5.CVA, PAF- eliquis, lopressor 25mg q8..  6.UTI-ABX.F/U urine cx.  7.HTN  8.PPI.

## 2023-10-30 NOTE — PROGRESS NOTE ADULT - PROBLEM SELECTOR PLAN 2
p/w ams, dysuria  UA positive  Urine culture positive for Ecoli  Start ABx - Rocephin 1g qd p/w AMS may be 2/2 UTI vs new CVA   UA (+), UCx Ecoli  CTH age indeterminant left MCA  MRI: as above  Similar-appearing extensive chronic white matter microvascular type   changes and additional chronic lacunar infarcts,  c/w Aracely   Neuro consulted: Dr. Preston p/w AMS may be 2/2 UTI vs new CVA   UA (+), UCx Ecoli  CTH age indeterminant left MCA  MRI: as above  Patient awake and alert, A&0 X1-2(baseline)

## 2023-10-30 NOTE — PROGRESS NOTE ADULT - PROBLEM SELECTOR PLAN 3
hx CVA on plavix and atorvastatin   unknown residual deficit  R sided upper and lower extremity weakness, slurred speech    code stroke in ED with NIHSS 6  CTH age indeterminant left MCA  Tele monitoring  F/U Echo with bubble study  Pt not rTPA candidate on DOAC  PT consult  Dysphagia screen failed, speech and swallow eval- soft and bite sized   Neuro checks q4  Neuro Consulted Dr. Preston p/w ams, dysuria  UA positive  Urine culture positive for Ecoli  Start ABx - Rocephin 1g qd p/w ams, dysuria  UA positive  Urine culture positive for Ecoli  Rocephin 1g qd (day 2)

## 2023-10-30 NOTE — PROGRESS NOTE ADULT - PROBLEM SELECTOR PLAN 5
hx afib on ELIQUIS  can tolerate diet, started eliquis again Trop: 131 downtrended; likely demand ischemia

## 2023-10-30 NOTE — PROGRESS NOTE ADULT - SUBJECTIVE AND OBJECTIVE BOX
PGY-1 Progress Note discussed with attending    PAGER #: [[phone]] TILL 5:00 PM  PLEASE CONTACT ON CALL TEAM:  - On Call Team (Please refer to Marck) FROM 5:00 PM - 8:30PM  - Nightfloat Team FROM 8:30 -7:30 AM    CHIEF COMPLAINT & BRIEF HOSPITAL COURSE:    INTERVAL HPI/OVERNIGHT EVENTS:       REVIEW OF SYSTEMS:  CONSTITUTIONAL: No fever, weight loss, or fatigue  RESPIRATORY: No cough, wheezing, chills or hemoptysis; No shortness of breath  CARDIOVASCULAR: No chest pain, palpitations, dizziness, or leg swelling  GASTROINTESTINAL: No abdominal pain. No nausea, vomiting, or hematemesis; No diarrhea or constipation. No melena or hematochezia.  GENITOURINARY: No dysuria or hematuria, urinary frequency  NEUROLOGICAL: No headaches, memory loss, loss of strength, numbness, or tremors  SKIN: No itching, burning, rashes, or lesions     Vital Signs Last 24 Hrs  T(C): 36.7 (30 Oct 2023 04:35), Max: 37 (29 Oct 2023 19:58)  T(F): 98.1 (30 Oct 2023 04:35), Max: 98.6 (29 Oct 2023 19:58)  HR: 87 (30 Oct 2023 04:35) (72 - 87)  BP: 133/95 (30 Oct 2023 04:35) (132/86 - 175/99)  BP(mean): --  RR: 18 (30 Oct 2023 04:35) (18 - 18)  SpO2: 97% (30 Oct 2023 04:35) (92% - 100%)    Parameters below as of 30 Oct 2023 04:35  Patient On (Oxygen Delivery Method): room air  O2 Flow (L/min): 2      PHYSICAL EXAMINATION:  GENERAL: NAD, well built  HEAD:  Atraumatic, Normocephalic  EYES:  conjunctiva and sclera clear  NECK: Supple, No JVD, Normal thyroid  CHEST/LUNG: Clear to auscultation. Clear to percussion bilaterally; No rales, rhonchi, wheezing, or rubs  HEART: Regular rate and rhythm; No murmurs, rubs, or gallops  ABDOMEN: Soft, Nontender, Nondistended; Bowel sounds present  NERVOUS SYSTEM:  Alert & Oriented X3,    EXTREMITIES:  2+ Peripheral Pulses, No clubbing, cyanosis, or edema  SKIN: warm dry                          12.8   7.30  )-----------( 251      ( 29 Oct 2023 07:50 )             38.1     10-29    146<H>  |  109<H>  |  8   ----------------------------<  86  3.3<L>   |  31  |  0.63    Ca    8.0<L>      29 Oct 2023 07:50  Phos  3.3     10-29  Mg     1.9     10-29                CAPILLARY BLOOD GLUCOSE      RADIOLOGY & ADDITIONAL TESTS:                   PGY-1 Progress Note discussed with attending    PAGER #: [[phone]] TILL 5:00 PM  PLEASE CONTACT ON CALL TEAM:  - On Call Team (Please refer to Marck) FROM 5:00 PM - 8:30PM  - Nightfloat Team FROM 8:30 -7:30 AM      INTERVAL HPI/OVERNIGHT EVENTS: Patient examined at bedside, she has laboured breathing but saturating 100% on 2l O2. offers no complaints.      REVIEW OF SYSTEMS:  CONSTITUTIONAL: No fever, weight loss, or fatigue  RESPIRATORY: No cough, wheezing, chills or hemoptysis; No shortness of breath  CARDIOVASCULAR: No chest pain, palpitations, dizziness, or leg swelling  GASTROINTESTINAL: No abdominal pain. No nausea, vomiting, or hematemesis; No diarrhea or constipation. No melena or hematochezia.  GENITOURINARY: No dysuria or hematuria, urinary frequency  NEUROLOGICAL: No headaches, memory loss, loss of strength, numbness, or tremors  SKIN: No itching, burning, rashes, or lesions     Vital Signs Last 24 Hrs  T(C): 36.7 (30 Oct 2023 04:35), Max: 37 (29 Oct 2023 19:58)  T(F): 98.1 (30 Oct 2023 04:35), Max: 98.6 (29 Oct 2023 19:58)  HR: 87 (30 Oct 2023 04:35) (72 - 87)  BP: 133/95 (30 Oct 2023 04:35) (132/86 - 175/99)  BP(mean): --  RR: 18 (30 Oct 2023 04:35) (18 - 18)  SpO2: 97% (30 Oct 2023 04:35) (92% - 100%)    Parameters below as of 30 Oct 2023 04:35  Patient On (Oxygen Delivery Method): room air  O2 Flow (L/min): 2      PHYSICAL EXAMINATION:  GENERAL: NAD, well built  HEAD:  Atraumatic, Normocephalic  EYES:  conjunctiva and sclera clear  NECK: Supple, No JVD, Normal thyroid  CHEST/LUNG: Clear to auscultation.  No rales, rhonchi, wheezing, or rubs  HEART: Regular rate and rhythm; No murmurs, rubs, or gallops  ABDOMEN: Soft, Nontender, Nondistended; Bowel sounds present  NERVOUS SYSTEM:  Alert & Oriented X1-2, RLE & RUE 3/5, Sensation intact   EXTREMITIES:  2+ Peripheral Pulses, No clubbing, cyanosis, or edema  SKIN: warm dry                          12.8   7.30  )-----------( 251      ( 29 Oct 2023 07:50 )             38.1     10-29    146<H>  |  109<H>  |  8   ----------------------------<  86  3.3<L>   |  31  |  0.63    Ca    8.0<L>      29 Oct 2023 07:50  Phos  3.3     10-29  Mg     1.9     10-29                CAPILLARY BLOOD GLUCOSE      RADIOLOGY & ADDITIONAL TESTS:

## 2023-10-30 NOTE — PROGRESS NOTE ADULT - ASSESSMENT
This is an 88 y/o female from [facility] with pmhx of Afib, arthritis, HTN, CVA presenting to the ED for fall and AMS as per NH papers. Patient is a poor historian, answers " i don't know" to every question, collateral history was taken form ED attending chart and NH papers. Patient states she has right sided weakness after previous CVA accident. Endorses burning on urination.  Admitted for fall and AMS       In ED  vitals: BP: 121/85, HR: 114, T: 36.4  S/P Rocephin, haloperidol 2 mg and 2.5mg   Ua(+), f/u Ucx   CTH age indeterminant left MCA  Trop 131  This is an 86 y/o female from [facility] with pmhx of Afib, arthritis, HTN, CVA presenting to the ED for fall and AMS as per NH papers. Patient is a poor historian, answers " i don't know" to every question, collateral history was taken from ED attending chart and NH papers. Patient states she has right sided weakness after previous CVA accident. Endorses burning on urination.  Admitted for fall and AMS       In ED  vitals: BP: 121/85, HR: 114, T: 36.4  S/P Rocephin, haloperidol 2 mg and 2.5mg   Ua(+), f/u Ucx   CTH age indeterminant left MCA  Trop 131  This is an 86 y/o female from [facility] with pmhx of Afib, arthritis, HTN, CVA presenting to the ED for fall and AMS as per NH papers. Patient is a poor historian, answers " i don't know" to every question, collateral history was taken from ED attending chart and NH papers. Patient states she has right sided weakness after previous CVA accident. Endorses burning on urination.  Admitted for fall and AMS.       In ED  vitals: BP: 121/85, HR: 114, T: 36.4  S/P Rocephin, haloperidol 2 mg and 2.5mg   CTH age indeterminant left MCA  MRI :  Trop 131  This is an 86 y/o female from [facility] with pmhx of Afib, arthritis, HTN, CVA presenting to the ED for fall and AMS as per NH papers. Patient is a poor historian, answers " i don't know" to every question, collateral history was taken from ED attending chart and NH papers. Patient states she has right sided weakness after previous CVA accident. Endorses burning on urination.  Admitted for fall and AMS.

## 2023-10-30 NOTE — PROGRESS NOTE ADULT - PROBLEM SELECTOR PLAN 4
Trop: 131 downtrended; likely demand ischemia chronic vs paroxysmal afib on ELIQUIS  can tolerate diet, started eliquis again

## 2023-10-31 NOTE — DIETITIAN INITIAL EVALUATION ADULT - PERTINENT LABORATORY DATA
10-31    145  |  107  |  10  ----------------------------<  77  3.4<L>   |  30  |  0.72    Ca    8.4      31 Oct 2023 07:08  Phos  3.5     10-31  Mg     2.0     10-31    TPro  6.8  /  Alb  2.9<L>  /  TBili  0.7  /  DBili  x   /  AST  25  /  ALT  23  /  AlkPhos  193<H>  10-31  A1C with Estimated Average Glucose Result: 5.7 % (10-28-23 @ 06:10)

## 2023-10-31 NOTE — SWALLOW BEDSIDE ASSESSMENT ADULT - SWALLOW EVAL: DIAGNOSIS
Pt p/w s&s of oropharyngeal dysphagia c/b .hold, lingual pumping, reduced mastication, reduced A-P transport, delayed swallow trigger, and weak hyolaryngeal elevation. No overt s&s of aspiration/penetration visualized at this evaluation.
Pt p/w s/s of mild oropharyngeal dysphagia c/b anterior splillage, impaired mastication,  impaired bolus formation, slightly prolonged bolus holding, oral residue, suspected delayed initiation of pharyngeal swallow, reduced laryngeal elevation upon palpation. No overt s/s aspiration on trials.

## 2023-10-31 NOTE — PROGRESS NOTE ADULT - PROBLEM SELECTOR PLAN 3
p/w ams, dysuria  UA positive  Urine culture positive for Ecoli  Rocephin  completed p/w AMS may be 2/2 UTI vs new CVA   UA (+), UCx Ecoli  CTH age indeterminant left MCA  MRI: as above  Patient awake and alert, A&0 X1-2(baseline)

## 2023-10-31 NOTE — SWALLOW BEDSIDE ASSESSMENT ADULT - ORAL PREPARATORY PHASE
lingual pumping/Decreased mastication ability/Bolus falls into anterior sulcus lingual pumping lingual pumping/Decreased mastication ability

## 2023-10-31 NOTE — SWALLOW BEDSIDE ASSESSMENT ADULT - COMMENTS
HOB elevated to 90°. AA+OX2. Pt awoken with verbal and tactile cues. Pt p/w weak, breathy voice and mildly labored breathing at baseline. Pt endorsed difficulty speaking 2/2 breath support and R-sided weakness in face. Pt also p/w limited ROM of the tongue. Pt was able to follow simple, one-step directions and answer queries. Pt at one point reported a "choking" sensation when eating, and later in the evaluation, stated "that was before." Pt opened mouth for all PO trials.

## 2023-10-31 NOTE — DIETITIAN INITIAL EVALUATION ADULT - PERTINENT MEDS FT
MEDICATIONS  (STANDING):  apixaban 5 milliGRAM(s) Oral two times a day  atorvastatin 80 milliGRAM(s) Oral at bedtime  cefTRIAXone   IVPB 1000 milliGRAM(s) IV Intermittent every 24 hours  dronedarone 400 milliGRAM(s) Oral two times a day  ergocalciferol 82778 Unit(s) Oral <User Schedule>  furosemide   Injectable 20 milliGRAM(s) IV Push once  metoprolol tartrate 25 milliGRAM(s) Oral every 8 hours  nystatin Powder 1 Application(s) Topical every 12 hours  pantoprazole  Injectable 40 milliGRAM(s) IV Push daily  potassium chloride   Powder 40 milliEquivalent(s) Oral every 4 hours    MEDICATIONS  (PRN):

## 2023-10-31 NOTE — PROGRESS NOTE ADULT - SUBJECTIVE AND OBJECTIVE BOX
Date of Service 10-31-23 @ 09:18    CHIEF COMPLAINT:Patient is a 87y old  Female who presents with a chief complaint of Fall and AMS.Pt appears comfortable.    	  REVIEW OF SYSTEMS:  CONSTITUTIONAL: No fever, weight loss, or fatigue  EYES: No eye pain, visual disturbances, or discharge  ENT:  No difficulty hearing, tinnitus, vertigo; No sinus or throat pain  NECK: No pain or stiffness  RESPIRATORY: No cough, wheezing, chills or hemoptysis; No Shortness of Breath  CARDIOVASCULAR: No chest pain, palpitations, passing out, dizziness, or leg swelling  GASTROINTESTINAL: No abdominal or epigastric pain. No nausea, vomiting, or hematemesis; No diarrhea or constipation. No melena or hematochezia.  GENITOURINARY: No dysuria, frequency, hematuria, or incontinence  NEUROLOGICAL: No headaches, memory loss, loss of strength, numbness, or tremors  SKIN: No itching, burning, rashes, or lesions   LYMPH Nodes: No enlarged glands  ENDOCRINE: No heat or cold intolerance; No hair loss  MUSCULOSKELETAL: No joint pain or swelling; No muscle, back, or extremity pain  PSYCHIATRIC: No depression, anxiety, mood swings, or difficulty sleeping  HEME/LYMPH: No easy bruising, or bleeding gums  ALLERGY AND IMMUNOLOGIC: No hives or eczema	      PHYSICAL EXAM:  T(C): 36.9 (10-31-23 @ 08:05), Max: 37 (10-30-23 @ 21:04)  HR: 78 (10-31-23 @ 08:05) (69 - 83)  BP: 167/91 (10-31-23 @ 08:05) (113/73 - 167/91)  RR: 18 (10-31-23 @ 08:05) (18 - 20)  SpO2: 100% (10-31-23 @ 08:05) (100% - 100%)  Wt(kg): --  I&O's Summary    30 Oct 2023 07:01  -  31 Oct 2023 07:00  --------------------------------------------------------  IN: 0 mL / OUT: 400 mL / NET: -400 mL        Appearance: Normal	  HEENT:   Normal oral mucosa, PERRL, EOMI	  Lymphatic: No lymphadenopathy  Cardiovascular: Normal S1 S2, No JVD, No murmurs, No edema  Respiratory: Dec BS at bases  Psychiatry: A & O x 3, Mood & affect appropriate  Gastrointestinal:  Soft, Non-tender, + BS	  Skin: No rashes, No ecchymoses, No cyanosis	  Neurologic: Non-focal  Extremities: Normal range of motion, No clubbing, cyanosis or edema  Vascular: Peripheral pulses palpable 2+ bilaterally    MEDICATIONS  (STANDING):  apixaban 5 milliGRAM(s) Oral two times a day  atorvastatin 80 milliGRAM(s) Oral at bedtime  dronedarone 400 milliGRAM(s) Oral two times a day  ergocalciferol 36314 Unit(s) Oral <User Schedule>  furosemide   Injectable 20 milliGRAM(s) IV Push once  metoprolol tartrate 25 milliGRAM(s) Oral every 8 hours  nystatin Powder 1 Application(s) Topical every 12 hours  pantoprazole  Injectable 40 milliGRAM(s) IV Push daily  potassium chloride   Powder 40 milliEquivalent(s) Oral every 4 hours      TELEMETRY: 	nsr    	  	  LABS:	 	      Troponin I, High Sensitivity Result: 356.0 ng/L (10-28 @ 10:40)                            14.4   6.35  )-----------( 290      ( 31 Oct 2023 07:08 )             43.1     10-31    145  |  107  |  10  ----------------------------<  77  3.4<L>   |  30  |  0.72    Ca    8.4      31 Oct 2023 07:08  Phos  3.5     10-31  Mg     2.0     10-31    TPro  6.8  /  Alb  2.9<L>  /  TBili  0.7  /  DBili  x   /  AST  25  /  ALT  23  /  AlkPhos  193<H>  10-31    proBNP:   Lipid Profile: Cholesterol 126  LDL --  HDL 63  TG 67  Ldl calc 50  Ratio --    HgA1c:   TSH: Thyroid Stimulating Hormone, Serum: 1.19 uU/mL (10-29 @ 07:50)      	    < from: CT Chest No Cont (10.30.23 @ 13:06) >  ACC: [id number] EXAM:  CT CHEST   ORDERED BY:  MOLINA CHATMAN     PROCEDURE DATE:  10/30/2023          INTERPRETATION:  Clinical indication: Shortness of breath.    Axial CT images of the chest are obtained without intravenous   administration of contrast.    Comparison is made with the prior PET/CT of May 21, 2019.    No enlarged axillary, mediastinal or hilar lymph nodes.    No pericardial effusion. Heart size is normal. Arterial calcifications   with involvement of the aorta. Diffuse moderate distention of the   esophagus containing internal ingested material.    Evaluation of the upper abdomen demonstrate no significant abnormality.    Small bilateral pleural effusions.    Evaluation of the lung parenchyma is limited due to respiratory motion   artifact. Emphysema. Mild bilateral lower lung areas of dependent or   compressive atelectasis adjacent to the pleural fusions given the   superimposed motion artifact. Interval resolution of the nodular   opacities within the right lower lobe and the right middle lobe seen on   the prior PET/CT with residual linear areas of atelectasis.    No central endobronchial lesions.    Degenerative changes of the spine and the shoulders. Moderate loss of   height of the T5 vertebral body is new since 10/21/2019. Old healed right   rib fractures. Bone island within the T7 vertebral body is unchanged.    IMPRESSION: Limited chest CT due to respiratory motion artifact.    Small bilateral pleural effusions with adjacent mild bilateral lower lung   areas of atelectasis.    Emphysema.    Moderate distention of the esophagus containing internal retained   ingested material. Esophagram can be performed for further evaluation as   clinically warranted.    Moderate loss of height of the T5 vertebralbody new since May 21, 2019   is of indeterminate age. Correlation for back pain is recommended. Lumbar   spine MRI can be performed for further evaluation as clinically warranted.    --- End of Report ---            ANGELO CHICAS MD; Attending Radiologist    < end of copied text >    Culture - Urine (10.27.23 @ 17:20)   - Amikacin: S <=16  - Amoxicillin/Clavulanic Acid: S <=8/4 Consider reserving for cystitis when ampicillin/sulbactam is resistant  - Ampicillin: R >16 These ampicillin results predict results for amoxicillin  - Ampicillin/Sulbactam: R >16/8  - Aztreonam: S <=4  - Cefazolin: S 8 For uncomplicated UTI with K. pneumoniae, E. coli, or P. mirablis: EMILY <=16 is sensitive and EMILY >=32 is resistant. This also predicts results for oral agents cefaclor, cefdinir, cefpodoxime, cefprozil, cefuroxime axetil, cephalexin and locarbef for uncomplicated UTI. Note that some isolates may be susceptible to these agents while testing resistant to cefazolin.  - Cefepime: S <=2  - Cefoxitin: S <=8  - Ceftriaxone: S <=1  - Cefuroxime: S <=4  - Ciprofloxacin: S <=0.25  - Ertapenem: S <=0.5  - Gentamicin: S <=2  - Imipenem: S <=1  - Levofloxacin: S <=0.5  - Meropenem: S <=1  - Nitrofurantoin: S <=32 Should not be used to treat pyelonephritis  - Piperacillin/Tazobactam: S <=8  - Tobramycin: S <=2  - Trimethoprim/Sulfamethoxazole: S <=0.5/9.5  Specimen Source: Clean Catch Clean Catch (Midstream)  Culture Results:   >100,000 CFU/ml Klebsiella pneumoniae  Organism Identification: Klebsiella pneumoniae  Organism: Klebsiella pneumoniae  Method Type: EMILY    < from: TTE with Doppler (w/Cont) (Transthoracic Echocardiogram) (10.30.23 @ 07:22) >  OBSERVATIONS:  Mitral Valve: Normal mitral valve. Trace mitral  regurgitation.  Aortic Root: Aortic Root: 3.5 cm. Ascending aorta not well  seen.  Aortic Valve: Trileaflet aortic valve. No aortic stenosis.  Mild aortic regurgitation.  Left Atrium: Normal left atrium.  LA volume index = 21  cc/m2.  Left Ventricle: Normal Left Ventricular Systolic Function,  (EF = 62 % by biplane) No regional wall motion  abnormalities. Mildly increased left ventricular wall  thickness.  The diastolic function is indeterminate based  on current diagnostic criteria.  Right Heart: Right atrium not well visualized.  Right  ventricle not well visualized, grossly normal size and  function on limited views.Normal tricuspid valve. Mild  tricuspid regurgitation. Pulmonic valve not well seen.  Trace pulmonic insufficiency is noted.  Pericardium/PleuraTrivial pericardial effusion is seen.  Ovoid hypoechoic structure (4.4cm x 2.9cm), lateral to the  left atrium and inferolateral wall, with some mass effect.  Consider dedicated chest CT for further evaluation as  clincially indicated.  Hemodynamic: Unable to estimate RA pressure.  RVSP is  increased; Unable to accurately estimate RVSP. TR velocity  is 3.6m/s.  Agitated saline injection notable for probable  patent foramen ovale or atrial septal defect. Consider  additional dedicated imaging as clinically indicated.    < end of copied text >

## 2023-10-31 NOTE — DIETITIAN INITIAL EVALUATION ADULT - OTHER INFO
seen by swallow On 10/28/23, where a soft and bite sized, thin liquids diet was recommended. Then again On (10/30/23) where a minced and moist, mildly thick liquids recommended. ordered for minced and moist, mildly thick liquids and coughing per RN, may be placed NPO and have swallow re-evaluate patient . diet just changed To NPO per MD. provide diet as per swallow

## 2023-10-31 NOTE — PROGRESS NOTE ADULT - PROBLEM SELECTOR PLAN 4
chronic vs paroxysmal afib on ELIQUIS  can tolerate diet, started eliquis again p/w ams, dysuria  UA positive  Urine culture positive for Ecoli  Rocephin  completed

## 2023-10-31 NOTE — PROGRESS NOTE ADULT - PROBLEM SELECTOR PLAN 2
p/w AMS may be 2/2 UTI vs new CVA   UA (+), UCx Ecoli  CTH age indeterminant left MCA  MRI: as above  Patient awake and alert, A&0 X1-2(baseline) Patient unable to tolerate miniced and moist diet  CT chest: Moderate distention of the esophagus containing internal retained ingested material  Speeech and swallow consulted  GI Dr Van consulted

## 2023-10-31 NOTE — SWALLOW BEDSIDE ASSESSMENT ADULT - SLP PERTINENT HISTORY OF CURRENT PROBLEM
Pt is an 88 y/o female from [facility] with pmhx of Parox Afib, arthritis, HTN, CVA presenting to the ED for fall and AMS as per NH papers. Patient is a poor historian, answers " i don't know" to every question, collateral history was taken form ED attending chart and NH papers. Patient states she has right sided weakness after previous CVA accident (slurred speech noted), however unknown true baseline, code stroke in ED with NIHSS score of 6. Endorses burning on urination.  Denies any other complaint. Does not know why she is at hospital nor cano she remember a fall.
As per chart review, pt is an 86 y/o female from [facility] with pmhx of Parox Afib, arthritis, HTN, CVA presenting to the ED for fall and AMS as per NH papers. Pt stated at admission she has right sided weakness after previous CVA accident (slurred speech noted), however unknown true baseline, code stroke in ED with NIHSS score of 6. Endorses burning on urination.  Denies any other complaint. Does not know why she is at hospital nor cano she remember a fall.

## 2023-10-31 NOTE — PROGRESS NOTE ADULT - ASSESSMENT
This is an 86 y/o female from [facility] with pmhx of Afib, arthritis, HTN, CVA presenting to the ED for fall and AMS as per NH papers. Patient is a poor historian, collateral history was taken from ED attending chart and NH papers. Patient states she has right sided weakness after previous CVA accident. Endorses burning on urination.  Admitted for fall and AMS.

## 2023-10-31 NOTE — PROGRESS NOTE ADULT - SUBJECTIVE AND OBJECTIVE BOX
PGY-1 Progress Note discussed with attending    PAGER #: [[phone]] TILL 5:00 PM  PLEASE CONTACT ON CALL TEAM:  - On Call Team (Please refer to Marck) FROM 5:00 PM - 8:30PM  - Nightfloat Team FROM 8:30 -7:30 AM    CHIEF COMPLAINT & BRIEF HOSPITAL COURSE:    INTERVAL HPI/OVERNIGHT EVENTS:       REVIEW OF SYSTEMS:  CONSTITUTIONAL: No fever, weight loss, or fatigue  RESPIRATORY: No cough, wheezing, chills or hemoptysis; No shortness of breath  CARDIOVASCULAR: No chest pain, palpitations, dizziness, or leg swelling  GASTROINTESTINAL: No abdominal pain. No nausea, vomiting, or hematemesis; No diarrhea or constipation. No melena or hematochezia.  GENITOURINARY: No dysuria or hematuria, urinary frequency  NEUROLOGICAL: No headaches, memory loss, loss of strength, numbness, or tremors  SKIN: No itching, burning, rashes, or lesions     Vital Signs Last 24 Hrs  T(C): 36.4 (31 Oct 2023 11:06), Max: 37 (30 Oct 2023 21:04)  T(F): 97.5 (31 Oct 2023 11:06), Max: 98.6 (30 Oct 2023 21:04)  HR: 73 (31 Oct 2023 11:06) (69 - 83)  BP: 115/75 (31 Oct 2023 11:06) (113/73 - 167/91)  BP(mean): --  RR: 18 (31 Oct 2023 11:06) (18 - 20)  SpO2: 100% (31 Oct 2023 11:06) (98% - 100%)    Parameters below as of 31 Oct 2023 11:06  Patient On (Oxygen Delivery Method): nasal cannula  O2 Flow (L/min): 2      PHYSICAL EXAMINATION:  GENERAL: NAD, well built  HEAD:  Atraumatic, Normocephalic  EYES:  conjunctiva and sclera clear  NECK: Supple, No JVD, Normal thyroid  CHEST/LUNG: Clear to auscultation. Clear to percussion bilaterally; No rales, rhonchi, wheezing, or rubs  HEART: Regular rate and rhythm; No murmurs, rubs, or gallops  ABDOMEN: Soft, Nontender, Nondistended; Bowel sounds present  NERVOUS SYSTEM:  Alert & Oriented X3,    EXTREMITIES:  2+ Peripheral Pulses, No clubbing, cyanosis, or edema  SKIN: warm dry                          14.4   6.35  )-----------( 290      ( 31 Oct 2023 07:08 )             43.1     10-31    145  |  107  |  10  ----------------------------<  77  3.4<L>   |  30  |  0.72    Ca    8.4      31 Oct 2023 07:08  Phos  3.5     10-31  Mg     2.0     10-31    TPro  6.8  /  Alb  2.9<L>  /  TBili  0.7  /  DBili  x   /  AST  25  /  ALT  23  /  AlkPhos  193<H>  10-31    LIVER FUNCTIONS - ( 31 Oct 2023 07:08 )  Alb: 2.9 g/dL / Pro: 6.8 g/dL / ALK PHOS: 193 U/L / ALT: 23 U/L DA / AST: 25 U/L / GGT: x                   CAPILLARY BLOOD GLUCOSE      RADIOLOGY & ADDITIONAL TESTS:                   PGY-1 Progress Note discussed with attending    PAGER #: [[phone]] TILL 5:00 PM  PLEASE CONTACT ON CALL TEAM:  - On Call Team (Please refer to Marck) FROM 5:00 PM - 8:30PM  - Nightfloat Team FROM 8:30 -7:30 AM    CHIEF COMPLAINT & BRIEF HOSPITAL COURSE:    INTERVAL HPI/OVERNIGHT EVENTS: Pt unable to tolerate minced and moist diet, pt coughing. patient has laboured breathing      REVIEW OF SYSTEMS:  CONSTITUTIONAL: No fever, weight loss, or fatigue  RESPIRATORY: No cough, wheezing, chills or hemoptysis;   CARDIOVASCULAR: No chest pain, palpitations, dizziness, or leg swelling  GASTROINTESTINAL: No abdominal pain. No nausea, vomiting, or hematemesis; No diarrhea or constipation. No melena or hematochezia.  GENITOURINARY: No dysuria or hematuria, urinary frequency  NEUROLOGICAL: No headaches, memory loss, loss of strength, numbness, or tremors  SKIN: No itching, burning, rashes, or lesions     Vital Signs Last 24 Hrs  T(C): 36.4 (31 Oct 2023 11:06), Max: 37 (30 Oct 2023 21:04)  T(F): 97.5 (31 Oct 2023 11:06), Max: 98.6 (30 Oct 2023 21:04)  HR: 73 (31 Oct 2023 11:06) (69 - 83)  BP: 115/75 (31 Oct 2023 11:06) (113/73 - 167/91)  BP(mean): --  RR: 18 (31 Oct 2023 11:06) (18 - 20)  SpO2: 100% (31 Oct 2023 11:06) (98% - 100%)    Parameters below as of 31 Oct 2023 11:06  Patient On (Oxygen Delivery Method): nasal cannula  O2 Flow (L/min): 2      PHYSICAL EXAMINATION:  GENERAL: NAD, well built  HEAD:  Atraumatic, Normocephalic  EYES:  conjunctiva and sclera clear  NECK: Supple, No JVD, Normal thyroid  CHEST/LUNG: Clear to auscultation. No rales, rhonchi, wheezing, or rubs  HEART: Regular rate and rhythm; No murmurs, rubs, or gallops  ABDOMEN: Soft, Nontender, Nondistended; Bowel sounds present  NERVOUS SYSTEM:  Alert & Oriented X3,    EXTREMITIES:  2+ Peripheral Pulses, No clubbing, cyanosis, or edema  SKIN: warm dry                          14.4   6.35  )-----------( 290      ( 31 Oct 2023 07:08 )             43.1     10-31    145  |  107  |  10  ----------------------------<  77  3.4<L>   |  30  |  0.72    Ca    8.4      31 Oct 2023 07:08  Phos  3.5     10-31  Mg     2.0     10-31    TPro  6.8  /  Alb  2.9<L>  /  TBili  0.7  /  DBili  x   /  AST  25  /  ALT  23  /  AlkPhos  193<H>  10-31    LIVER FUNCTIONS - ( 31 Oct 2023 07:08 )  Alb: 2.9 g/dL / Pro: 6.8 g/dL / ALK PHOS: 193 U/L / ALT: 23 U/L DA / AST: 25 U/L / GGT: x                   CAPILLARY BLOOD GLUCOSE      RADIOLOGY & ADDITIONAL TESTS:

## 2023-10-31 NOTE — PROGRESS NOTE ADULT - ASSESSMENT
88 y/o female from [place] with pmhx of Parox Afib, arthritis, HTN, CVA presenting to the ED for fall and AMS as per NH papers,UIT and borderline troponins,+CVA's.  1.Tele monitoring.  2.Neurology f/u.?Eliquis failure, check carotid doppler.  3.Echocardiogram.  4.SOB with pleural efusions-Lasix 20mg ivx1 today.  5.CVA, PAF- eliquis, lopressor 25mg q8..  6.UTI-ABX.  7.HTN  8.PPI.  9.Distended esophagus-GI eval.

## 2023-10-31 NOTE — CONSULT NOTE ADULT - RESPIRATORY
no wheezes/no respiratory distress/no use of accessory muscles/no subcutaneous emphysema/airway patent/breath sounds equal/rales/rhonchi

## 2023-10-31 NOTE — SWALLOW BEDSIDE ASSESSMENT ADULT - NS ASR SWALLOW FINDINGS DISCUS
IDRIS Rosales, Dr. Darling via TEAMS/Physician/Nursing/Patient
D/w pt, Dr. Andrea, and Dietician Gaye/Physician/Patient/Dietitian

## 2023-10-31 NOTE — PROGRESS NOTE ADULT - PROBLEM SELECTOR PROBLEM 2
Anticoagulation Clinic Progress Note    Anticoagulation Summary  As of 10/28/2019    INR goal:   2.0-3.0   TTR:   35.7 % (1 y)   INR used for dosing:   3.2! (10/28/2019)   Warfarin maintenance plan:   7.5 mg every Mon, Wed, Fri; 5 mg all other days   Weekly warfarin total:   42.5 mg   No change documented:   Rebekah Villa RPH   Plan last modified:   Jody Machado, Pharmacy Intern (9/30/2019)   Next INR check:   11/12/2019   Priority:   Maintenance   Target end date:   Indefinite    Indications    Anticoagulated on Coumadin [Z79.01]  DVT (deep venous thrombosis) (CMS/HCC) [I82.409] [I82.409]             Anticoagulation Episode Summary     INR check location:       Preferred lab:       Send INR reminders to:   [facility]    Comments:         Anticoagulation Care Providers     Provider Role Specialty Phone number    Lynda Serrato MD Referring Cardiology [phone]          Clinic Interview:      INR History:  Anticoagulation Monitoring 9/9/2019 9/30/2019 10/28/2019   INR 2.4 1.9 3.2   INR Date 9/9/2019 9/30/2019 10/28/2019   INR Goal 2.0-3.0 2.0-3.0 2.0-3.0   Trend Same Up Same   Last Week Total 40 mg 40 mg 42.5 mg   Next Week Total 40 mg 42.5 mg 42.5 mg   Sun 5 mg 5 mg 5 mg   Mon 7.5 mg 7.5 mg 7.5 mg   Tue 5 mg 5 mg 5 mg   Wed 5 mg 7.5 mg 7.5 mg   Thu 5 mg 5 mg 5 mg   Fri 7.5 mg 7.5 mg 7.5 mg   Sat 5 mg 5 mg 5 mg   Visit Report - - -   Some recent data might be hidden       Plan:  1. INR is Supratherapeutic today- see above in Anticoagulation Summary.  Will instruct Scott Allen to Continue their warfarin regimen- see above in Anticoagulation Summary.  2. Follow up in 2 weeks  3. Patient declines warfarin refills.  4. Verbal and written information provided. Patient expresses understanding and has no further questions at this time.    Rebekah Villa RPH   AMS (altered mental status) Dysphagia

## 2023-10-31 NOTE — DIETITIAN INITIAL EVALUATION ADULT - ORAL INTAKE PTA/DIET HISTORY
unable to interview patient as confused and having test done at this time at bedside.   Follows [facility] , regular diet at nursing home

## 2023-10-31 NOTE — SWALLOW BEDSIDE ASSESSMENT ADULT - ASR SWALLOW LINGUAL MOBILITY
impaired protrusion/impaired anterior elevation/impaired left lateral movement/impaired right lateral movement
lingual deviation to the right

## 2023-10-31 NOTE — SWALLOW BEDSIDE ASSESSMENT ADULT - SWALLOW EVAL: FUNCTIONAL LEVEL AT TIME OF EVAL
Dependent
Dependent for feeding at the time of this eval; pt able to self feed w/ hand-over-hand support but asked to be fed

## 2023-10-31 NOTE — CONSULT NOTE ADULT - GASTROINTESTINAL
soft/nontender/nondistended/normal active bowel sounds/no guarding/no rigidity/no organomegaly/no palpable gay

## 2023-10-31 NOTE — CONSULT NOTE ADULT - SUBJECTIVE AND OBJECTIVE BOX
Patient is a 87y old  Female who presents with a chief complaint of Fall and AMS (28 Oct 2023 00:02)      HPI:  The patient herself is unable to provide a history. The following record is obtained from the chart:  This is an 86 y/o female from [facility] with pmhx of Parox Afib, arthritis, HTN, CVA presenting to the ED for fall and AMS as per NH papers. Patient is a poor historian, answers " i don't know" to every question, collateral history was taken form ED attending chart and NH papers. Patient states she has right sided weakness after previous CVA accident (slurred speech noted), however unknown true baseline, code stroke in ED with NIHSS score of 6. Endorses burning on urination.  Denies any other complaint. Does not know why she is at hospital nor cano she remember a fall.   PAST MEDICAL & SURGICAL HISTORY:  Hypertension      Hyperlipidemia      Glaucoma      Other nonspecific abnormal finding of lung field      CVA (cerebrovascular accident)      H/O: glaucoma  left          FAMILY HISTORY:  FHx: hypertension          Social Hx:  Nonsmoker, no drug or alcohol use    MEDICATIONS  (STANDING):  cefTRIAXone   IVPB 1000 milliGRAM(s) IV Intermittent every 24 hours  enoxaparin Injectable 75 milliGRAM(s) SubCutaneous every 12 hours  nystatin Powder 1 Application(s) Topical every 12 hours  pantoprazole  Injectable 40 milliGRAM(s) IV Push daily       Allergies    No Known Allergies    Intolerances        ROS: Pertinent positives in HPI, all other ROS were reviewed and are negative.      Vital Signs Last 24 Hrs  T(C): 36.7 (28 Oct 2023 10:55), Max: 36.7 (28 Oct 2023 04:28)  T(F): 98 (28 Oct 2023 10:55), Max: 98.1 (28 Oct 2023 04:28)  HR: 87 (28 Oct 2023 10:55) (73 - 114)  BP: 136/72 (28 Oct 2023 10:55) (121/85 - 155/74)  BP(mean): --  RR: 18 (28 Oct 2023 10:55) (18 - 23)  SpO2: 100% (28 Oct 2023 10:55) (94% - 100%)    Parameters below as of 28 Oct 2023 10:55  Patient On (Oxygen Delivery Method): room air            Constitutional: awake and alert.  HEENT: PERRLA, EOMI,   Neck: Supple.  Respiratory: Breath sounds are clear bilaterally  Cardiovascular: S1 and S2, / irregular rhythm  Gastrointestinal: soft, nontender  Extremities:  no edema  Vascular: Caritid Bruit - no  Musculoskeletal: no joint swelling/tenderness, no abnormal movements  Skin: No rashes    Neurological exam:  HF: A x O x 1. Appropriately interactive, normal affect. Speech nonfluent, No Aphasia or paraphasic errors. Naming /repetition abnormal   CN: EDEN, EOMI, VF left hemianopsia, facial sensation normal, right NLFD, tongue midline, Palate moves equally, SCM equal bilaterally  Motor: No pronator drift, Strength 1/5 LUE and 4/5 RUE 3/5 RLE and 4/5LLE in normal bulk and spastic RUE, no tremor, rigidity or bradykinesia.    Sens: Intact to light touch / PP/ VS/ JS    Reflexes: Brisker right side and Babinski R  Coord:  No FNFA, dysmetria, YARI intact   Gait/Balance: Unable to walk    NIHSS: 13 MRS 4  1A: Level of consciousness       +1= Arouses to minor stimulation  1B: Ask month and age       +2= 0 questions right  1C: "Blink eyes" and "Squeeze Hands"       0= Performs both  2: Horizontal EOMs       0= Normal  3: Visual fields       +2= Complete hemianopia  4: Facial palsy (use grimace if obtunded)       +1= Minor paralysis (flat NLF, smile asymmetry)  5A: Left arm motor drift (count out loud and use fingers to show count)       +1= Drift but doesn't hit bed  5B: Right arm motor drift       +2= Some effort against gravity  6A: Left leg motor drift       +1= Drift but doesn't hit bed  6B: Right leg motor drift       +1= Drift but doesn't hit bed  7: Limb ataxia (FNF/heel-shin)       0= No ataxia  8: Sensation       0= Normal, no sensory loss  9: Language/aphasia- describe the scene (on shanthi); name the items; read the sentences (on shanthi)       +1= mild-moderate aphaisa: some obvious changes without significant limitation  10: Dysarthria- read the words       +1= mild-moderate dysarthria: slurring but can be understood  11: Extinction/inattention       0= No abnormality            Labs:                        12.8   8.60  )-----------( 249      ( 28 Oct 2023 06:10 )             37.5     10-28    146<H>  |  109<H>  |  11  ----------------------------<  88  3.7   |  28  |  0.69    Ca    8.4      28 Oct 2023 06:10  Phos  3.3     10-28  Mg     2.1     10-28    TPro  6.5  /  Alb  3.1<L>  /  TBili  1.1  /  DBili  x   /  AST  21  /  ALT  18  /  AlkPhos  196<H>  10-27      10-28 Chol 126 LDL -- HDL 63 Trig 67  PT/INR - ( 27 Oct 2023 15:25 )   PT: 17.8 sec;   INR: 1.58 ratio         PTT - ( 27 Oct 2023 15:25 )  PTT:41.7 sec    Radiology report:  - CT head:    < from: CT Brain Stroke Protocol (10.27.23 @ 15:00) >  ACC: [id number] EXAM:  CT BRAIN STROKE PROTOCOL   ORDERED BY: KAIDEN MAYORGA     PROCEDURE DATE:  10/27/2023          INTERPRETATION:  CLINICAL STATEMENT: CVA    TECHNIQUE: CT head without IV contrast    COMPARISON: None.    FINDINGS:  CT Head:  Age-indeterminate left MCA territorial infarct noted measuring 1.8 x 1.7   cm.    There are areas of low attenuation in the periventricular white matter   likely related to moderate chronic microvascular ischemic changes.    Nonspecific small hypodensity left midbrain.    There is no acute intracranial hemorrhage, or midline shift. Mildly   prominent ventricles noted which may be related to central atrophy.   Partial empty sella noted    The cranium is intact. The visualized paranasal sinuses are well-aerated.              IMPRESSION:     No acute intracranial hemorrhage. Age-indeterminate left MCA territorial   infarct as described above. MRI exam recommended. Dr. Mayorga notified   10/27/2023 at 3:08 PM    --- End of Report ---            MATY ALMEIDA MD; Attending Radiologist  This document has been electronically signed. Oct 27 2023  3:44PM    < end of copied text >  
[  ] STAT REQUEST              [ X ] ROUTINE REQUEST    Patient is a 87 year old female with dilated esophagus. GI consulted to evaluate.        HPI:  This is an 87 year old female, from [facility] with past medical history significant for Afib, arthritis, HTN, CVA presented to the emergency room post fall. Patient is confused and can not provide history. On admission patient found to have dilated esophagus on CT-Scan of chest. No abdominal pain, nausea, vomiting, hematemesis, hematochezia, melena, fever, chills, chest pain, SOB, cough, hematuria, dysuria or diarrhea reported.              PAIN MANAGEMENT:  Pain Scale:                 0/10  Pain Location:         PAST MEDICAL HISTORY    Hypertension    Hyperlipidemia    Glaucoma    CVA (cerebrovascular accident)    Afib    Arthritis        PAST SURGICAL HISTORY     No significant surgical history reported        Allergies    No Known Allergies    Intolerances  None         MEDICATIONS  (STANDING):  apixaban 5 milliGRAM(s) Oral two times a day  atorvastatin 80 milliGRAM(s) Oral at bedtime  cefTRIAXone   IVPB 1000 milliGRAM(s) IV Intermittent every 24 hours  dronedarone 400 milliGRAM(s) Oral two times a day  ergocalciferol 91443 Unit(s) Oral <User Schedule>  metoprolol tartrate 25 milliGRAM(s) Oral every 8 hours  nystatin Powder 1 Application(s) Topical every 12 hours  pantoprazole  Injectable 40 milliGRAM(s) IV Push daily         SOCIAL HISTORY  Advanced Directives:       [ X ] Full Code       [  ] DNR  Marital Status:         [  ] M      [ X ] S      [  ] D       [  ] W  Children:       [ X ] Yes      [  ] No  Occupation:        [  ] Employed       [ X ] Unemployed       [  ] Retired  Diet:       [ X ] Regular       [  ] PEG feeding          [  ] NG tube feeding  Drug Use:           [ X ] No                    [  ] Yes  Alcohol:           [ X ] No             [  ] Yes (socially)         [  ] Yes (chronic)  Tobacco:           [  ] Yes           [ X ] No      FAMILY HISTORY  [ X ] Heart Disease            [ X ] Diabetes             [ X ] HTN             [  ] Colon Cancer             [  ] Stomach Cancer              [  ] Pancreatic Cancer      VITAL SIGNS   Vital Signs Last 24 Hrs  T(C): 36.4 (10-31-23 @ 11:06), Max: 37 (10-30-23 @ 21:04)  T(F): 97.5 (10-31-23 @ 11:06), Max: 98.6 (10-30-23 @ 21:04)  HR: 73 (10-31-23 @ 11:06) (69 - 80)  BP: 115/75 (10-31-23 @ 11:06) (113/73 - 167/91)   RR: 18 (10-31-23 @ 11:06) (18 - 20)  SpO2: 100% (10-31-23 @ 11:06) (98% - 100%)  Daily Weight in k.9 (31 Oct 2023 04:42)         CBC Full  -  ( 31 Oct 2023 07:08 )  WBC Count : 6.35 K/uL  RBC Count : 5.57 M/uL  Hemoglobin : 14.4 g/dL  Hematocrit : 43.1 %  Platelet Count - Automated : 290 K/uL  Mean Cell Volume : 77.4 fl  Mean Cell Hemoglobin : 25.9 pg  Mean Cell Hemoglobin Concentration : 33.4 gm/dL  Auto Neutrophil # : 4.29 K/uL  Auto Lymphocyte # : 0.89 K/uL  Auto Monocyte # : 1.01 K/uL  Auto Eosinophil # : 0.12 K/uL  Auto Basophil # : 0.02 K/uL  Auto Neutrophil % : 67.6 %  Auto Lymphocyte % : 14.0 %  Auto Monocyte % : 15.9 %  Auto Eosinophil % : 1.9 %  Auto Basophil % : 0.3 %      10-31    145  |  107  |  10  ----------------------------<  77  3.4<L>   |  30  |  0.72    Ca    8.4      31 Oct 2023 07:08  Phos  3.5     10-31  Mg     2.0     10-31    TPro  6.8  /  Alb  2.9<L>  /  TBili  0.7  /  DBili  x   /  AST  25  /  ALT  23  /  AlkPhos  193<H>  10-31     Urinalysis (10.27.23 @ 17:20)   Glucose Qualitative, Urine: Negative mg/dL  Blood, Urine: Moderate  pH Urine: 6.0  Color: Dark Yellow  Urine Appearance: Cloudy  Bilirubin: Small  Ketone - Urine: 15 mg/dL  Specific Gravity: 1.020  Protein, Urine: 100 mg/dL  Urobilinogen: 2.0 mg/dL  Nitrite: Negative  Leukocyte Esterase Concentration: Moderate      ECG    Ventricular Rate 103 BPM    Atrial Rate 103 BPM    P-R Interval 168 ms    QRS Duration 88 ms    Q-T Interval 356 ms    QTC Calculation(Bazett) 466 ms    P Axis 69 degrees    R Axis -21 degrees    T Axis 42 degrees    Diagnosis Line Sinus tachycardia  Minimal voltage criteria for LVH, may be normal variant  Borderline ECG             RADIOLOGY/IMAGING                  ACC: [id number] EXAM:  CT CHEST   ORDERED BY:  MOLINA CHATMAN     PROCEDURE DATE:  10/30/2023          INTERPRETATION:  Clinical indication: Shortness of breath.    Axial CT images of the chest are obtained without intravenous   administration of contrast.    Comparison is made with the prior PET/CT of May 21, 2019.    No enlarged axillary, mediastinal or hilar lymph nodes.    No pericardial effusion. Heart size is normal. Arterial calcifications   with involvement of the aorta. Diffuse moderate distention of the   esophagus containing internal ingested material.    Evaluation of the upper abdomen demonstrate no significant abnormality.    Small bilateral pleural effusions.    Evaluation of the lung parenchyma is limited due to respiratory motion   artifact. Emphysema. Mild bilateral lower lung areas of dependent or   compressive atelectasis adjacent to the pleural fusions given the   superimposed motion artifact. Interval resolution of the nodular   opacities within the right lower lobe and the right middle lobe seen on   the prior PET/CT with residual linear areas of atelectasis.    No central endobronchial lesions.    Degenerative changes of the spine and the shoulders. Moderate loss of   height of the T5 vertebral body is new since 10/21/2019. Old healed right   rib fractures. Bone island within the T7 vertebral body is unchanged.    IMPRESSION: Limited chest CT due to respiratory motion artifact.    Small bilateral pleural effusions with adjacent mild bilateral lower lung   areas of atelectasis.    Emphysema.    Moderate distention of the esophagus containing internal retained   ingested material. Esophagram can be performed for further evaluation as   clinically warranted.    Moderate loss of height of the T5 vertebralbody new since May 21, 2019   is of indeterminate age. Correlation for back pain is recommended. Lumbar   spine MRI can be performed for further evaluation as clinically warranted.

## 2023-10-31 NOTE — CHART NOTE - NSCHARTNOTEFT_GEN_A_CORE
Advanced endoscopy Dr. Chavez was consulted by primary GI Dr. Van for EGD.  Chart reviewed.  Patient is an 87F with a PMHx of pAFib (on eliquis), arthritis, HTN, CVA, who presented to the ED on 10/28 from her NH s/p fall and AMs.  Code stroke in the ED.   CTH: age indeterminant left MCA. UA+, s/p IV CTX, haldol 2mg and 2.5mg. Trop 131.   SLP eval 10/28, rec soft and bite-sized diet and thin liquids.   Neurology consulted for new ischemic stroke vs seizures, rec MRI head, continue Lovenox for AFib and not ASA and clopidogrel.   MRI Head noncon 10/29 notable for acute/subacute infarction within the posterior high left frontal lobe with additional areas ofa cute/subacute ischemia along the left corticospinal tract.   CTH noncon 10/30 notable for emphysema, small bilateral pleural effusions, moderate distention of the esophagus containing internal retained ingested material.  Repeat SLP eval 10/31, rec puree/mildly thick liquids.     #Dysphagia  #Esophagus distention on CT    - Tentative EGD tomorrow, 11/1, with Dr. Chavez  - Medical optimization per primary team  - Neuro risk stratification given new stroke  - HOLD ELIQUIS NOW  - Hold chemical DVT ppx x 24 hrs of procedure if safe per primary team  - Dr. Van to remain primary GI     Case discussed with Dr. Chavez Advanced endoscopy Dr. Chavez was consulted by primary GI Dr. Van for EGD.  Chart reviewed.  Patient is an 87F with a PMHx of pAFib (on eliquis), arthritis, HTN, CVA, who presented to the ED on 10/28 from her NH s/p fall and AMs.  Code stroke in the ED.   CTH: age indeterminant left MCA. UA+, s/p IV CTX, haldol 2mg and 2.5mg. Trop 131.   SLP eval 10/28, rec soft and bite-sized diet and thin liquids.   Neurology consulted for new ischemic stroke vs seizures, rec MRI head, continue Lovenox for AFib and not ASA and clopidogrel.   MRI Head noncon 10/29 notable for acute/subacute infarction within the posterior high left frontal lobe with additional areas ofa cute/subacute ischemia along the left corticospinal tract.   CTH noncon 10/30 notable for emphysema, small bilateral pleural effusions, moderate distention of the esophagus containing internal retained ingested material.  Repeat SLP eval 10/31, rec puree/mildly thick liquids.     #Dysphagia  #Esophagus distention on CT    - Tentative EGD tomorrow, 11/1, with Dr. Chavez  - Medical optimization per primary team  - Neuro risk stratification given new stroke  Last dose of Eliquis 10/31 @ 5AM  - HOLD ELIQUIS NOW  - Hold chemical DVT ppx x 24 hrs of procedure if safe per primary team  - Dr. Van to remain primary GI     Case discussed with Dr. Chavez

## 2023-10-31 NOTE — CONSULT NOTE ADULT - ASSESSMENT
New ischemic stroke vs seizures: R    MRI head wo contrast, continue enoxaparin for AFib not aspirin and clopidogrel, PT swallowing eval, PT,  DVT prophylaxis
1. Dilated esophagus  2. R/o distal esophageal stricture  3. R/o esophageal malignancy  4. R/o achalasia  5. R/o esophageal impaction    Suggestions:    1. Esophagram  2. Aspiration precaution  3. NPO  4. IVF hydration  5. Protonix daily  6. DVT prophylaxis

## 2023-10-31 NOTE — PROGRESS NOTE ADULT - PROBLEM SELECTOR PLAN 1
hx CVA on plavix and atorvastatin   unknown residual deficit  R sided upper and lower extremity weakness, slurred speech    code stroke in ED with NIHSS 6  CTH age indeterminant left MCA  MRI: Acute/subacute infarction within the posterior high left frontal lobe and along the left corticospinal tract. No gross hemorrhagic transformation. Petechial hemorrhagic transformation within the posterior left frontal lobe is a   possibility   Tele monitoring  Echo with bubble study: Normal LVEF 62 %, PFO/ ASD  Pt not rTPA candidate on DOAC  PT consult- rec long term care  Dysphagia screen failed, speech and swallow eval- soft and bite sized   Neuro checks q4  Neuro Consulted Dr. Preston hx CVA on plavix and atorvastatin   unknown residual deficit  R sided upper and lower extremity weakness, slurred speech    code stroke in ED with NIHSS 6  CTH age indeterminant left MCA  MRI: Acute/subacute infarction within the posterior high left frontal lobe and along the left corticospinal tract. No gross hemorrhagic transformation. Petechial hemorrhagic transformation within the posterior left frontal lobe is a   possibility   Tele monitoring  Echo with bubble study: Normal LVEF 62 %, PFO/ ASD  Pt not rTPA candidate on DOAC  PT consult- rec long term care  Neuro checks q4  Neuro Consulted Dr. Preston

## 2023-10-31 NOTE — PROGRESS NOTE ADULT - PROBLEM SELECTOR PLAN 6
on furosemide 20mg (LE edema)  s/p iv lasix 20mg   on lopressor Trop: 131 downtrended; likely demand ischemia

## 2023-10-31 NOTE — SWALLOW BEDSIDE ASSESSMENT ADULT - SWALLOW EVAL: RECOMMENDED FEEDING/EATING TECHNIQUES
allow for swallow between intakes/alternate food with liquid/maintain upright posture during/after eating for 30 mins/oral hygiene/position upright (90 degrees)/small sips/bites
allow for swallow between intakes/alternate food with liquid/check mouth frequently for oral residue/pocketing/crush medication (when feasible)/hard swallow w/ each bite or sip/maintain upright posture during/after eating for 30 mins/no straws/oral hygiene/position upright (90 degrees)/small sips/bites

## 2023-11-01 NOTE — PROGRESS NOTE ADULT - SUBJECTIVE AND OBJECTIVE BOX
PGY-1 Progress Note discussed with attending    PAGER #: [[phone]] TILL 5:00 PM  PLEASE CONTACT ON CALL TEAM:  - On Call Team (Please refer to Marck) FROM 5:00 PM - 8:30PM  - Nightfloat Team FROM 8:30 -7:30 AM    CHIEF COMPLAINT & BRIEF HOSPITAL COURSE:    INTERVAL HPI/OVERNIGHT EVENTS:       REVIEW OF SYSTEMS:  CONSTITUTIONAL: No fever, weight loss, or fatigue  RESPIRATORY: No cough, wheezing, chills or hemoptysis; No shortness of breath  CARDIOVASCULAR: No chest pain, palpitations, dizziness, or leg swelling  GASTROINTESTINAL: No abdominal pain. No nausea, vomiting, or hematemesis; No diarrhea or constipation. No melena or hematochezia.  GENITOURINARY: No dysuria or hematuria, urinary frequency  NEUROLOGICAL: No headaches, memory loss, loss of strength, numbness, or tremors  SKIN: No itching, burning, rashes, or lesions     Vital Signs Last 24 Hrs  T(C): 36.6 (01 Nov 2023 04:46), Max: 36.8 (01 Nov 2023 00:04)  T(F): 97.8 (01 Nov 2023 04:46), Max: 98.2 (01 Nov 2023 00:04)  HR: 72 (01 Nov 2023 04:46) (60 - 78)  BP: 135/80 (01 Nov 2023 04:46) (110/65 - 153/97)  BP(mean): --  RR: 18 (01 Nov 2023 04:46) (18 - 19)  SpO2: 95% (01 Nov 2023 04:46) (95% - 100%)    Parameters below as of 01 Nov 2023 04:46  Patient On (Oxygen Delivery Method): nasal cannula  O2 Flow (L/min): 2      PHYSICAL EXAMINATION:  GENERAL: NAD, well built  HEAD:  Atraumatic, Normocephalic  EYES:  conjunctiva and sclera clear  NECK: Supple, No JVD, Normal thyroid  CHEST/LUNG: Clear to auscultation. Clear to percussion bilaterally; No rales, rhonchi, wheezing, or rubs  HEART: Regular rate and rhythm; No murmurs, rubs, or gallops  ABDOMEN: Soft, Nontender, Nondistended; Bowel sounds present  NERVOUS SYSTEM:  Alert & Oriented X3,    EXTREMITIES:  2+ Peripheral Pulses, No clubbing, cyanosis, or edema  SKIN: warm dry                          12.9   5.19  )-----------( 278      ( 01 Nov 2023 06:21 )             38.7     11-01    146<H>  |  108  |  11  ----------------------------<  76  3.7   |  30  |  0.71    Ca    8.1<L>      01 Nov 2023 06:21  Phos  3.4     11-01  Mg     1.9     11-01    TPro  6.0  /  Alb  2.4<L>  /  TBili  0.5  /  DBili  x   /  AST  25  /  ALT  23  /  AlkPhos  177<H>  11-01    LIVER FUNCTIONS - ( 01 Nov 2023 06:21 )  Alb: 2.4 g/dL / Pro: 6.0 g/dL / ALK PHOS: 177 U/L / ALT: 23 U/L DA / AST: 25 U/L / GGT: x                   CAPILLARY BLOOD GLUCOSE      RADIOLOGY & ADDITIONAL TESTS:                   PGY-1 Progress Note discussed with attending    PAGER #: [[phone]] TILL 5:00 PM  PLEASE CONTACT ON CALL TEAM:  - On Call Team (Please refer to Marck) FROM 5:00 PM - 8:30PM  - Nightfloat Team FROM 8:30 -7:30 AM    .  INTERVAL HPI/OVERNIGHT EVENTS: Patient examined at bedside, offers no complaints      REVIEW OF SYSTEMS:  CONSTITUTIONAL: No fever, weight loss, or fatigue  RESPIRATORY: No cough, wheezing, chills or hemoptysis; No shortness of breath  CARDIOVASCULAR: No chest pain, palpitations, dizziness, or leg swelling  GASTROINTESTINAL: No abdominal pain. No nausea, vomiting, or hematemesis; No diarrhea or constipation. No melena or hematochezia.  GENITOURINARY: No dysuria or hematuria, urinary frequency  NEUROLOGICAL: No headaches, memory loss, loss of strength, numbness, or tremors  SKIN: No itching, burning, rashes, or lesions     Vital Signs Last 24 Hrs  T(C): 36.6 (01 Nov 2023 04:46), Max: 36.8 (01 Nov 2023 00:04)  T(F): 97.8 (01 Nov 2023 04:46), Max: 98.2 (01 Nov 2023 00:04)  HR: 72 (01 Nov 2023 04:46) (60 - 78)  BP: 135/80 (01 Nov 2023 04:46) (110/65 - 153/97)  BP(mean): --  RR: 18 (01 Nov 2023 04:46) (18 - 19)  SpO2: 95% (01 Nov 2023 04:46) (95% - 100%)    Parameters below as of 01 Nov 2023 04:46  Patient On (Oxygen Delivery Method): nasal cannula  O2 Flow (L/min): 2      PHYSICAL EXAMINATION:  GENERAL: NAD, well built  HEAD:  Atraumatic, Normocephalic  EYES:  conjunctiva and sclera clear  NECK: Supple, No JVD, Normal thyroid  CHEST/LUNG: Clear to auscultation. No rales, rhonchi, wheezing, or rubs  HEART: Regular rate and rhythm; No murmurs, rubs, or gallops  ABDOMEN: Soft, Nontender, Nondistended; Bowel sounds present  NERVOUS SYSTEM:  Alert & Oriented X3,    EXTREMITIES:  2+ Peripheral Pulses, No clubbing, cyanosis, or edema  SKIN: warm dry                          12.9   5.19  )-----------( 278      ( 01 Nov 2023 06:21 )             38.7     11-01    146<H>  |  108  |  11  ----------------------------<  76  3.7   |  30  |  0.71    Ca    8.1<L>      01 Nov 2023 06:21  Phos  3.4     11-01  Mg     1.9     11-01    TPro  6.0  /  Alb  2.4<L>  /  TBili  0.5  /  DBili  x   /  AST  25  /  ALT  23  /  AlkPhos  177<H>  11-01    LIVER FUNCTIONS - ( 01 Nov 2023 06:21 )  Alb: 2.4 g/dL / Pro: 6.0 g/dL / ALK PHOS: 177 U/L / ALT: 23 U/L DA / AST: 25 U/L / GGT: x                   CAPILLARY BLOOD GLUCOSE      RADIOLOGY & ADDITIONAL TESTS:

## 2023-11-01 NOTE — PROGRESS NOTE ADULT - PROBLEM SELECTOR PLAN 2
Patient unable to tolerate miniced and moist diet  CT chest: Moderate distention of the esophagus containing internal retained ingested material  Speeech and swallow consulted  GI Dr Van consulted Patient unable to tolerate minced and moist diet  CT chest: Moderate distention of the esophagus containing internal retained ingested material  Speeech and swallow consulted- rec pureed diet  EGD (11/01): stasis in the esophagus with moderate amount of liquid food debris present, suctioned with clearance. No stricture seen. (Biopsy). Mild antral gastropathy. (Biopsy).  f/u biopsy  recommended modified barium swallow  GI Dr Van consulted Patient unable to tolerate minced and moist diet  CT chest: Moderate distention of the esophagus containing internal retained ingested material  Speeech and swallow consulted- rec pureed diet  EGD (11/01): stasis in the esophagus with moderate amount of liquid food debris present, suctioned with clearance. No stricture seen. (Biopsy). Mild antral gastropathy. (Biopsy).  f/u biopsy  recommended modified barium swallow; scheduled for tomorrrow   -NPO after midnight  GI Dr Van consulted

## 2023-11-01 NOTE — PROGRESS NOTE ADULT - ASSESSMENT
This is an 88 y/o female from [facility] with pmhx of Afib, arthritis, HTN, CVA presenting to the ED for fall and AMS as per NH papers. Patient is a poor historian, collateral history was taken from ED attending chart and NH papers. Patient states she has right sided weakness after previous CVA accident. Endorses burning on urination.  Admitted for fall and AMS.

## 2023-11-01 NOTE — PROGRESS NOTE ADULT - ASSESSMENT
1. Dilated esophagus  2. S/p EGd  3. Esophagitis  4. Gastritis  5. R/o esophageal dysmotility    Suggestions:    1. Esophagram  2. Aspiration precaution  3. Diet as tolerated  4. Avoid NSAID  5. Protonix daily  6. DVT prophylaxis

## 2023-11-01 NOTE — PROGRESS NOTE ADULT - SUBJECTIVE AND OBJECTIVE BOX
Date of Service 11-01-23 @ 12:04    CHIEF COMPLAINT:Patient is a 87y old  Female who presents with a chief complaint of Fall and AMS.Pt appears comfortable.    	  REVIEW OF SYSTEMS:  CONSTITUTIONAL: No fever, weight loss, or fatigue  EYES: No eye pain, visual disturbances, or discharge  ENT:  No difficulty hearing, tinnitus, vertigo; No sinus or throat pain  NECK: No pain or stiffness  RESPIRATORY: No cough, wheezing, chills or hemoptysis; No Shortness of Breath  CARDIOVASCULAR: No chest pain, palpitations, passing out, dizziness, or leg swelling  GASTROINTESTINAL: No abdominal or epigastric pain. No nausea, vomiting, or hematemesis; No diarrhea or constipation. No melena or hematochezia.  GENITOURINARY: No dysuria, frequency, hematuria, or incontinence  NEUROLOGICAL: No headaches, memory loss, loss of strength, numbness, or tremors  SKIN: No itching, burning, rashes, or lesions   LYMPH Nodes: No enlarged glands  ENDOCRINE: No heat or cold intolerance; No hair loss  MUSCULOSKELETAL: No joint pain or swelling; No muscle, back, or extremity pain  PSYCHIATRIC: No depression, anxiety, mood swings, or difficulty sleeping  HEME/LYMPH: No easy bruising, or bleeding gums  ALLERGY AND IMMUNOLOGIC: No hives or eczema	      PHYSICAL EXAM:  T(C): 36.2 (11-01-23 @ 11:29), Max: 36.8 (11-01-23 @ 00:04)  HR: 58 (11-01-23 @ 11:29) (58 - 100)  BP: 118/66 (11-01-23 @ 11:29) (102/86 - 153/97)  RR: 15 (11-01-23 @ 11:29) (15 - 19)  SpO2: 97% (11-01-23 @ 11:29) (95% - 100%)  Wt(kg): --  I&O's Summary    31 Oct 2023 07:01  -  01 Nov 2023 07:00  --------------------------------------------------------  IN: 0 mL / OUT: 600 mL / NET: -600 mL    01 Nov 2023 07:01  -  01 Nov 2023 12:04  --------------------------------------------------------  IN: 0 mL / OUT: 350 mL / NET: -350 mL        Appearance: Normal	  HEENT:   Normal oral mucosa, PERRL, EOMI	  Lymphatic: No lymphadenopathy  Cardiovascular: Normal S1 S2, No JVD, No murmurs, No edema  Respiratory: Lungs clear to auscultation	  Psychiatry: A & O x 3, Mood & affect appropriate  Gastrointestinal:  Soft, Non-tender, + BS	  Skin: No rashes, No ecchymoses, No cyanosis	  Neurologic: Non-focal  Extremities: Normal range of motion, No clubbing, cyanosis or edema  Vascular: Peripheral pulses palpable 2+ bilaterally    MEDICATIONS  (STANDING):  atorvastatin 80 milliGRAM(s) Oral at bedtime  cefTRIAXone   IVPB 1000 milliGRAM(s) IV Intermittent every 24 hours  dronedarone 400 milliGRAM(s) Oral two times a day  ergocalciferol 88677 Unit(s) Oral <User Schedule>  metoprolol tartrate 25 milliGRAM(s) Oral every 8 hours  nystatin Powder 1 Application(s) Topical every 12 hours  pantoprazole  Injectable 40 milliGRAM(s) IV Push daily      	  	  LABS:	 	                         12.9   5.19  )-----------( 278      ( 01 Nov 2023 06:21 )             38.7     11-01    146<H>  |  108  |  11  ----------------------------<  76  3.7   |  30  |  0.71    Ca    8.1<L>      01 Nov 2023 06:21  Phos  3.4     11-01  Mg     1.9     11-01    TPro  6.0  /  Alb  2.4<L>  /  TBili  0.5  /  DBili  x   /  AST  25  /  ALT  23  /  AlkPhos  177<H>  11-01    proBNP:   Lipid Profile: Cholesterol 126  LDL --  HDL 63  TG 67  Ldl calc 50  Ratio --    HgA1c:   TSH: Thyroid Stimulating Hormone, Serum: 1.19 uU/mL (10-29 @ 07:50)      	  < from: TTE with Doppler (w/Cont) (Transthoracic Echocardiogram) (10.30.23 @ 07:22) >  OBSERVATIONS:  Mitral Valve: Normal mitral valve. Trace mitral  regurgitation.  Aortic Root: Aortic Root: 3.5 cm. Ascending aorta not well  seen.  Aortic Valve: Trileaflet aortic valve. No aortic stenosis.  Mild aortic regurgitation.  Left Atrium: Normal left atrium.  LA volume index = 21  cc/m2.  Left Ventricle: Normal Left Ventricular Systolic Function,  (EF = 62 % by biplane) No regional wall motion  abnormalities. Mildly increased left ventricular wall  thickness.  The diastolic function is indeterminate based  on current diagnostic criteria.  Right Heart: Right atrium not well visualized.  Right  ventricle not well visualized, grossly normal size and  function on limited views.Normal tricuspid valve. Mild  tricuspid regurgitation. Pulmonic valve not well seen.  Trace pulmonic insufficiency is noted.  Pericardium/PleuraTrivial pericardial effusion is seen.  Ovoid hypoechoic structure (4.4cm x 2.9cm), lateral to the  left atrium and inferolateral wall, with some mass effect.  Consider dedicated chest CT for further evaluation as  clincially indicated.  Hemodynamic: Unable to estimate RA pressure.  RVSP is  increased; Unable to accurately estimate RVSP. TR velocity  is 3.6m/s.  Agitated saline injection notable for probable  patent foramen ovale or atrial septal defect. Consider  additional dedicated imaging as clinically indicated.    < end of copied text >    < from: US Duplex Carotid Arteries Complete, Bilateral (10.31.23 @ 11:20) >  ACC: [id number] EXAM:  US DPLX CAROTIDS COMPL BI   ORDERED BY:  MOLINA CHATMAN     PROCEDURE DATE:  10/31/2023          INTERPRETATION:  CLINICAL INFORMATION: CVA.    COMPARISON: None available.    TECHNIQUE: Grayscale, color and spectral Dopplerexamination of both   carotid arteries was performed.    FINDINGS:    Mild plaque in the carotid bulbs. No elevated peak systolic velocities in   either internal carotid artery.    An arrhythmia is noted.    Peak systolic velocities are as follows:    RIGHT:  PROX CCA = 79 cm/s  DIST CCA = 68 cm/s  PROX ICA = 40 cm/s  DIST ICA = 51 cm/s  ECA = 85 cm/s    LEFT:  PROX CCA = 72 cm/s  DIST CCA = 49 cm/s  PROX ICA = 45 cm/s  DIST ICA = 94 cm/s  ECA = 55 cm/s    Antegrade flow is noted within both vertebral arteries.    IMPRESSION:    Mild plaque in the carotid bulbs with no evidence of a hemodynamically   significant stenosis in either internal carotid artery.    Arrhythmia.    Measurement of carotid stenosis is based on velocity parameters that   correlate the residual internal carotid diameter with that of the more   distal vessel in accordance with a method such as the [place] American   Symptomatic Carotid Endarterectomy Trial (NASCET).    --- End of Report ---            MONICA PERRY MD; Attending Radiologist  This document has been electronically signed. Oct 31 2023 11:36AM    < end of copied text >

## 2023-11-01 NOTE — PROGRESS NOTE ADULT - PROBLEM SELECTOR PLAN 1
hx CVA on plavix and atorvastatin   unknown residual deficit  R sided upper and lower extremity weakness, slurred speech    code stroke in ED with NIHSS 6  CTH age indeterminant left MCA  MRI: Acute/subacute infarction within the posterior high left frontal lobe and along the left corticospinal tract. No gross hemorrhagic transformation. Petechial hemorrhagic transformation within the posterior left frontal lobe is a   possibility   Tele monitoring  Echo with bubble study: Normal LVEF 62 %, PFO/ ASD  Pt not rTPA candidate on DOAC  PT consult- rec long term care  Neuro checks q4  Neuro Consulted Dr. Preston

## 2023-11-01 NOTE — PROGRESS NOTE ADULT - ASSESSMENT
88 y/o female from [place] with pmhx of Parox Afib, arthritis, HTN, CVA presenting to the ED for fall and AMS as per NH papers,UIT and borderline troponins,+CVA's.  1.Tele monitoring.  2.Neurology f/u.?Eliquis failure.  3.EGD-p.  4.SOB with pleural efusions-Lasix 20mg po qd.  5.CVA, PAF- eliquis, lopressor 25mg q8..  6.UTI-ABX.  7.HTN  8.PPI.

## 2023-11-01 NOTE — PROGRESS NOTE ADULT - PROBLEM SELECTOR PLAN 7
on furosemide 20mg (LE edema)  s/p iv lasix 20mg   on lopressor on furosemide 20mg (LE edema)  s/p iv lasix 20mg   on lasix 20mg PO daily  on lopressor 25mg q8

## 2023-11-01 NOTE — PROGRESS NOTE ADULT - PROBLEM SELECTOR PLAN 3
p/w AMS may be 2/2 UTI vs new CVA   UA (+), UCx Ecoli  CTH age indeterminant left MCA  MRI: as above  Patient awake and alert, A&0 X1-2(baseline)

## 2023-11-01 NOTE — PROGRESS NOTE ADULT - PROBLEM SELECTOR PLAN 5
chronic vs paroxysmal afib on ELIQUIS  can tolerate diet, started eliquis again chronic vs paroxysmal afib on ELIQUIS\  Holding AC in the setting of petechial hemorrhage in frontal lobe

## 2023-11-01 NOTE — PROGRESS NOTE ADULT - SUBJECTIVE AND OBJECTIVE BOX
[   ] ICU                                          [   ] CCU                                      [ X  ] Medical Floor    Patient is a 87 year old female with dilated esophagus. GI consulted to evaluate.        This is an 87 year old female, from [facility] with past medical history significant for Afib, arthritis, HTN, CVA presented to the emergency room post fall. Patient is confused and can not provide history. On admission patient found to have dilated esophagus on CT-Scan of chest. No abdominal pain, nausea, vomiting, hematemesis, hematochezia, melena, fever, chills, chest pain, SOB, cough, hematuria, dysuria or diarrhea reported.         Patient is comfortable. No new complaints reported, No abdominal pain, N/V, hematemesis, hematochezia, melena, fever, chills, chest pain, SOB, cough or diarrhea reported.       PAIN MANAGEMENT:  Pain Scale:                 0/10  Pain Location:         PAST MEDICAL HISTORY    Hypertension    Hyperlipidemia    Glaucoma    CVA (cerebrovascular accident)    Afib    Arthritis        PAST SURGICAL HISTORY     No significant surgical history reported        Allergies    No Known Allergies    Intolerances  None         MEDICATIONS  (STANDING):  apixaban 5 milliGRAM(s) Oral two times a day  atorvastatin 80 milliGRAM(s) Oral at bedtime  cefTRIAXone   IVPB 1000 milliGRAM(s) IV Intermittent every 24 hours  dronedarone 400 milliGRAM(s) Oral two times a day  ergocalciferol 01933 Unit(s) Oral <User Schedule>  metoprolol tartrate 25 milliGRAM(s) Oral every 8 hours  nystatin Powder 1 Application(s) Topical every 12 hours  pantoprazole  Injectable 40 milliGRAM(s) IV Push daily         SOCIAL HISTORY  Advanced Directives:       [ X ] Full Code       [  ] DNR  Marital Status:         [  ] M      [ X ] S      [  ] D       [  ] W  Children:       [ X ] Yes      [  ] No  Occupation:        [  ] Employed       [ X ] Unemployed       [  ] Retired  Diet:       [ X ] Regular       [  ] PEG feeding          [  ] NG tube feeding  Drug Use:           [ X ] No                    [  ] Yes  Alcohol:           [ X ] No             [  ] Yes (socially)         [  ] Yes (chronic)  Tobacco:           [  ] Yes           [ X ] No      FAMILY HISTORY  [ X ] Heart Disease            [ X ] Diabetes             [ X ] HTN             [  ] Colon Cancer             [  ] Stomach Cancer              [  ] Pancreatic Cancer        VITALS  Vital Signs Last 24 Hrs  T(C): 37 (01 Nov 2023 13:10), Max: 37 (01 Nov 2023 13:10)  T(F): 98.6 (01 Nov 2023 13:10), Max: 98.6 (01 Nov 2023 13:10)  HR: 57 (01 Nov 2023 13:10) (57 - 100)  BP: 98/56 (01 Nov 2023 13:10) (92/56 - 153/97)  BP(mean): --  RR: 16 (01 Nov 2023 13:10) (15 - 23)  SpO2: 100% (01 Nov 2023 13:10) (95% - 100%)    Parameters below as of 01 Nov 2023 13:10  Patient On (Oxygen Delivery Method): room air         MEDICATIONS  (STANDING):  atorvastatin 80 milliGRAM(s) Oral at bedtime  cefTRIAXone   IVPB 1000 milliGRAM(s) IV Intermittent every 24 hours  dronedarone 400 milliGRAM(s) Oral two times a day  ergocalciferol 40003 Unit(s) Oral <User Schedule>  furosemide    Tablet 20 milliGRAM(s) Oral daily  metoprolol tartrate 25 milliGRAM(s) Oral every 8 hours  nystatin Powder 1 Application(s) Topical every 12 hours  pantoprazole  Injectable 40 milliGRAM(s) IV Push daily    MEDICATIONS  (PRN):                            12.9   5.19  )-----------( 278      ( 01 Nov 2023 06:21 )             38.7       11-01    146<H>  |  108  |  11  ----------------------------<  76  3.7   |  30  |  0.71    Ca    8.1<L>      01 Nov 2023 06:21  Phos  3.4     11-01  Mg     1.9     11-01    TPro  6.0  /  Alb  2.4<L>  /  TBili  0.5  /  DBili  x   /  AST  25  /  ALT  23  /  AlkPhos  177<H>  11-01       Yes

## 2023-11-02 NOTE — SWALLOW VFSS/MBS ASSESSMENT ADULT - DIAGNOSTIC IMPRESSIONS
Pt p/w oropharyngeal dysphagia c/b weak lip seal on the right, pooling on right side; On Minced & Moist solid trial only, prolonged mastication, slow oral transit , and delayed swallow trigger was seen. Transient laryngeal penetration on thin liquids only (retrieved 100% during swallow). No aspiration seen on any trials.  Notable esophageal abnormalities: consistent protrusion noted on posterior Upper wall, with intermittent narrowing from anterior wall at intervals. Due to movement artifact & limitations with radiographic imaging, anterior visualizations were inconsistent.

## 2023-11-02 NOTE — PROGRESS NOTE ADULT - PROBLEM SELECTOR PLAN 2
Patient unable to tolerate minced and moist diet  CT chest: Moderate distention of the esophagus containing internal retained ingested material  Speeech and swallow consulted- rec pureed diet  EGD (11/01): stasis in the esophagus with moderate amount of liquid food debris present, suctioned with clearance. No stricture seen. (Biopsy). Mild antral gastropathy. (Biopsy).  f/u biopsy  s/p MBS: consistent protrusion noted on posterior Upper wall, with intermittent narrowing from anterior wall at intervals  GI Dr Van consulted- recommended esophagogram

## 2023-11-02 NOTE — PHARMACOTHERAPY INTERVENTION NOTE - COMMENTS
Patient has completed 7 days of ceftriaxone for treatment of urinary traction infection. Suggest to discontinue ceftriaxone.

## 2023-11-02 NOTE — SWALLOW VFSS/MBS ASSESSMENT ADULT - SLP GENERAL OBSERVATIONS
Study conducted with Radiologist, Dr. Youssef. Pt seated in imaging chair in L-lateral view. PO trials of IDDSI level 0, 2, 4, 5 administered. Posture was in natural Chin Tuck position during swallows. Self fed for spoon & straw sip (difficulty w/ cup sip 2/2 tilt at wrist).

## 2023-11-02 NOTE — PROGRESS NOTE ADULT - ASSESSMENT
88 y/o female from [place] with pmhx of Parox Afib, arthritis, HTN, CVA presenting to the ED for fall and AMS as per NH papers,UIT and borderline troponins,+CVA's.  1.Tele monitoring.  2.Neurology f/u.?Eliquis failure.  3.EGD- as above.  4.SOB with pleural efusions-Lasix 20mg po qd.  5.CVA, PAF- eliquis, lopressor 25mg q8..  6.UTI-ABX.  7.HTN  8.PPI.  9.Await esophogram-?dysmotility.

## 2023-11-02 NOTE — PROGRESS NOTE ADULT - SUBJECTIVE AND OBJECTIVE BOX
[   ] ICU                                          [   ] CCU                                      [ X  ] Medical Floor    Patient is a 87 year old female with dilated esophagus. GI consulted to evaluate.        This is an 87 year old female, from [facility] with past medical history significant for Afib, arthritis, HTN, CVA presented to the emergency room post fall. Patient is confused and can not provide history. On admission patient found to have dilated esophagus on CT-Scan of chest. No abdominal pain, nausea, vomiting, hematemesis, hematochezia, melena, fever, chills, chest pain, SOB, cough, hematuria, dysuria or diarrhea reported.         Patient is comfortable. No new complaints reported, No abdominal pain, N/V, hematemesis, hematochezia, melena, fever, chills, chest pain, SOB, cough or diarrhea reported.       PAIN MANAGEMENT:  Pain Scale:                 0/10  Pain Location:         PAST MEDICAL HISTORY    Hypertension    Hyperlipidemia    Glaucoma    CVA (cerebrovascular accident)    Afib    Arthritis        PAST SURGICAL HISTORY     No significant surgical history reported        Allergies    No Known Allergies    Intolerances  None         MEDICATIONS  (STANDING):  apixaban 5 milliGRAM(s) Oral two times a day  atorvastatin 80 milliGRAM(s) Oral at bedtime  cefTRIAXone   IVPB 1000 milliGRAM(s) IV Intermittent every 24 hours  dronedarone 400 milliGRAM(s) Oral two times a day  ergocalciferol 75901 Unit(s) Oral <User Schedule>  metoprolol tartrate 25 milliGRAM(s) Oral every 8 hours  nystatin Powder 1 Application(s) Topical every 12 hours  pantoprazole  Injectable 40 milliGRAM(s) IV Push daily         SOCIAL HISTORY  Advanced Directives:       [ X ] Full Code       [  ] DNR  Marital Status:         [  ] M      [ X ] S      [  ] D       [  ] W  Children:       [ X ] Yes      [  ] No  Occupation:        [  ] Employed       [ X ] Unemployed       [  ] Retired  Diet:       [ X ] Regular       [  ] PEG feeding          [  ] NG tube feeding  Drug Use:           [ X ] No                    [  ] Yes  Alcohol:           [ X ] No             [  ] Yes (socially)         [  ] Yes (chronic)  Tobacco:           [  ] Yes           [ X ] No      FAMILY HISTORY  [ X ] Heart Disease            [ X ] Diabetes             [ X ] HTN             [  ] Colon Cancer             [  ] Stomach Cancer              [  ] Pancreatic Cancer          VITALS  Vital Signs Last 24 Hrs  T(C): 37 (02 Nov 2023 11:07), Max: 37 (02 Nov 2023 11:07)  T(F): 98.6 (02 Nov 2023 11:07), Max: 98.6 (02 Nov 2023 11:07)  HR: 52 (02 Nov 2023 14:26) (52 - 84)  BP: 110/65 (02 Nov 2023 14:26) (104/70 - 132/85)   RR: 18 (02 Nov 2023 11:07) (18 - 19)  SpO2: 98% (02 Nov 2023 14:26) (95% - 100%)    Parameters below as of 02 Nov 2023 14:26  Patient On (Oxygen Delivery Method): nasal cannula  O2 Flow (L/min): 2       MEDICATIONS  (STANDING):  apixaban 5 milliGRAM(s) Oral two times a day  atorvastatin 80 milliGRAM(s) Oral at bedtime  dronedarone 400 milliGRAM(s) Oral two times a day  ergocalciferol 69085 Unit(s) Oral <User Schedule>  furosemide    Tablet 20 milliGRAM(s) Oral daily  lactated ringers. 1000 milliLiter(s) (50 mL/Hr) IV Continuous <Continuous>  metoprolol tartrate 25 milliGRAM(s) Oral every 8 hours  nystatin Powder 1 Application(s) Topical every 12 hours  pantoprazole  Injectable 40 milliGRAM(s) IV Push daily                                 12.8   5.00  )-----------( 307      ( 02 Nov 2023 07:18 )             38.5       11-02    147<H>  |  109<H>  |  14  ----------------------------<  94  3.7   |  32<H>  |  0.71    Ca    8.1<L>      02 Nov 2023 07:18  Phos  3.2     11-02  Mg     1.9     11-02    TPro  6.0  /  Alb  2.4<L>  /  TBili  0.5  /  DBili  x   /  AST  19  /  ALT  21  /  AlkPhos  172<H>  11-02

## 2023-11-02 NOTE — PROGRESS NOTE ADULT - SUBJECTIVE AND OBJECTIVE BOX
Date of Service 11-02-23 @ 08:55    CHIEF COMPLAINT:Patient is a 87y old  Female who presents with a chief complaint of Fall and AMS.Pt appears comfortable.    	  REVIEW OF SYSTEMS:  CONSTITUTIONAL: No fever, weight loss, or fatigue  EYES: No eye pain, visual disturbances, or discharge  ENT:  No difficulty hearing, tinnitus, vertigo; No sinus or throat pain  NECK: No pain or stiffness  RESPIRATORY: No cough, wheezing, chills or hemoptysis; No Shortness of Breath  CARDIOVASCULAR: No chest pain, palpitations, passing out, dizziness, or leg swelling  GASTROINTESTINAL: No abdominal or epigastric pain. No nausea, vomiting, or hematemesis; No diarrhea or constipation. No melena or hematochezia.  GENITOURINARY: No dysuria, frequency, hematuria, or incontinence  NEUROLOGICAL: No headaches, memory loss, loss of strength, numbness, or tremors  SKIN: No itching, burning, rashes, or lesions   LYMPH Nodes: No enlarged glands  ENDOCRINE: No heat or cold intolerance; No hair loss  MUSCULOSKELETAL: No joint pain or swelling; No muscle, back, or extremity pain  PSYCHIATRIC: No depression, anxiety, mood swings, or difficulty sleeping  HEME/LYMPH: No easy bruising, or bleeding gums  ALLERGY AND IMMUNOLOGIC: No hives or eczema	        PHYSICAL EXAM:  T(C): 36.5 (11-02-23 @ 07:32), Max: 37 (11-01-23 @ 13:10)  HR: 60 (11-02-23 @ 07:32) (57 - 100)  BP: 116/75 (11-02-23 @ 07:32) (92/56 - 132/85)  RR: 18 (11-02-23 @ 07:32) (15 - 23)  SpO2: 98% (11-02-23 @ 07:32) (96% - 100%)  Wt(kg): --  I&O's Summary    01 Nov 2023 07:01  -  02 Nov 2023 07:00  --------------------------------------------------------  IN: 240 mL / OUT: 750 mL / NET: -510 mL        Appearance: Normal	  HEENT:   Normal oral mucosa, PERRL, EOMI	  Lymphatic: No lymphadenopathy  Cardiovascular: Normal S1 S2, No JVD, No murmurs, No edema  Respiratory: Lungs clear to auscultation	  Psychiatry: A & O x 3, Mood & affect appropriate  Gastrointestinal:  Soft, Non-tender, + BS	  Skin: No rashes, No ecchymoses, No cyanosis	  Neurologic: Non-focal  Extremities: Normal range of motion, No clubbing, cyanosis or edema  Vascular: Peripheral pulses palpable 2+ bilaterally    MEDICATIONS  (STANDING):  atorvastatin 80 milliGRAM(s) Oral at bedtime  cefTRIAXone   IVPB 1000 milliGRAM(s) IV Intermittent every 24 hours  dronedarone 400 milliGRAM(s) Oral two times a day  ergocalciferol 80030 Unit(s) Oral <User Schedule>  furosemide    Tablet 20 milliGRAM(s) Oral daily  lactated ringers. 1000 milliLiter(s) (50 mL/Hr) IV Continuous <Continuous>  metoprolol tartrate 25 milliGRAM(s) Oral every 8 hours  nystatin Powder 1 Application(s) Topical every 12 hours  pantoprazole  Injectable 40 milliGRAM(s) IV Push daily      	  	  LABS:	 	                        12.8   5.00  )-----------( 307      ( 02 Nov 2023 07:18 )             38.5     11-02    147<H>  |  109<H>  |  14  ----------------------------<  94  3.7   |  32<H>  |  0.71    Ca    8.1<L>      02 Nov 2023 07:18  Phos  3.2     11-02  Mg     1.9     11-02    TPro  6.0  /  Alb  2.4<L>  /  TBili  0.5  /  DBili  x   /  AST  19  /  ALT  21  /  AlkPhos  172<H>  11-02      Lipid Profile: Cholesterol 126  LDL --  HDL 63  TG 67  Ldl calc 50  Ratio --    HgA1c:   TSH: Thyroid Stimulating Hormone, Serum: 1.19 uU/mL (10-29 @ 07:50)      	    < from: EGD (11.01.23 @ 11:00) >  Findings:        Esophagus Mucosa The mid and distal esophagus contained a moderate amount of    liquid food debris upon entry. This was suctioned and removed with complete    clearance. The esophagus appeared dilated diffusely. No stricture or resistance    encountered. Areas of white adherent plaques versus food debris present    throughout the esophagus. Multiple cold forceps biopsies from the proximal and    distal esophagus were performed for histology.        Stomach Mucosa Mild linear erythema of the mucosa was noted in the antrum.    Multiple cold forceps biopsies were performed for histology.        Duodenum Normal duodenum.        Impressions:        Evidence of stasis in the esophagus with moderate amount of liquid food debris    present, suctioned with clearance. No stricture seen. (Biopsy).        Mild antral gastropathy. (Biopsy).        Plan:        Return to floor for further management        Advance diet as tolerated        Await pathology        Esophagram to evaluate for dysmotility        Follow-up with primary GI Dr. Van

## 2023-11-02 NOTE — SWALLOW VFSS/MBS ASSESSMENT ADULT - ESOPHAGEAL STAGE
Notable esophageal abnormalities: consistent protrusion noted on posterior Upper wall, with intermittent narrowing from anterior wall at intervals. Due to movement artifact & limitations with radiographic imaging, anterior visualizations were inconsistent.

## 2023-11-02 NOTE — SWALLOW VFSS/MBS ASSESSMENT ADULT - ORAL PHASE
Reduced anterior - posterior transport/Uncontrolled bolus / spillover in barb-pharynx/Uncontrolled bolus / spillover in hypopharynx/Laryngeal penetration before swallow - silent Uncontrolled bolus / spillover in barb-pharynx/Uncontrolled bolus / spillover in hypopharynx Reduced anterior - posterior transport/Residue in oral cavity/Uncontrolled bolus / spillover in barb-pharynx Reduced anterior - posterior transport/Uncontrolled bolus / spillover in barb-pharynx

## 2023-11-02 NOTE — PROGRESS NOTE ADULT - ASSESSMENT
This is an 88 y/o female from [facility] with pmhx of Afib, arthritis, HTN, CVA presenting to the ED for fall and AMS as per NH papers. Patient is a poor historian, collateral history was taken from ED attending chart and NH papers. Patient states she has right sided weakness after previous CVA accident. Endorses burning on urination.  Admitted for fall and AMS.

## 2023-11-02 NOTE — SWALLOW VFSS/MBS ASSESSMENT ADULT - SLP PERTINENT HISTORY OF CURRENT PROBLEM
As per chart review, pt is an 88 y/o female from [facility] with pmhx of Parox Afib, arthritis, HTN, CVA presenting to the ED for fall and AMS as per NH papers. Pt stated at admission she has right sided weakness after previous CVA accident (slurred speech noted), however unknown true baseline, code stroke in ED with NIHSS score of 6. Endorses burning on urination.  Denies any other complaint. Does not know why she is at hospital nor cano she remember a fall.

## 2023-11-02 NOTE — SWALLOW VFSS/MBS ASSESSMENT ADULT - DEMONSTRATES NEED FOR REFERRAL TO ANOTHER SERVICE
Consider consult to GI to manage esophageal/GI concerns. Esophageal web vs. C-P Bar vs osteophyte cannot be ruled out./GI/neurology/occupational therapy/physical therapy

## 2023-11-02 NOTE — PROGRESS NOTE ADULT - PROBLEM SELECTOR PLAN 5
chronic vs paroxysmal afib on ELIQUIS\  Holding AC in the setting of petechial hemorrhage in frontal lobe

## 2023-11-02 NOTE — PROGRESS NOTE ADULT - SUBJECTIVE AND OBJECTIVE BOX
PGY-1 Progress Note discussed with attending    PAGER #: [[phone]] TILL 5:00 PM  PLEASE CONTACT ON CALL TEAM:  - On Call Team (Please refer to Marck) FROM 5:00 PM - 8:30PM  - Nightfloat Team FROM 8:30 -7:30 AM    CHIEF COMPLAINT & BRIEF HOSPITAL COURSE:    INTERVAL HPI/OVERNIGHT EVENTS:       REVIEW OF SYSTEMS:  CONSTITUTIONAL: No fever, weight loss, or fatigue  RESPIRATORY: No cough, wheezing, chills or hemoptysis; No shortness of breath  CARDIOVASCULAR: No chest pain, palpitations, dizziness, or leg swelling  GASTROINTESTINAL: No abdominal pain. No nausea, vomiting, or hematemesis; No diarrhea or constipation. No melena or hematochezia.  GENITOURINARY: No dysuria or hematuria, urinary frequency  NEUROLOGICAL: No headaches, memory loss, loss of strength, numbness, or tremors  SKIN: No itching, burning, rashes, or lesions     Vital Signs Last 24 Hrs  T(C): 37 (02 Nov 2023 11:07), Max: 37 (01 Nov 2023 13:10)  T(F): 98.6 (02 Nov 2023 11:07), Max: 98.6 (01 Nov 2023 13:10)  HR: 80 (02 Nov 2023 11:07) (57 - 84)  BP: 130/95 (02 Nov 2023 11:07) (92/56 - 132/85)  BP(mean): --  RR: 18 (02 Nov 2023 11:07) (15 - 23)  SpO2: 98% (02 Nov 2023 11:07) (96% - 100%)    Parameters below as of 02 Nov 2023 11:07  Patient On (Oxygen Delivery Method): nasal cannula  O2 Flow (L/min): 2      PHYSICAL EXAMINATION:  GENERAL: NAD, well built  HEAD:  Atraumatic, Normocephalic  EYES:  conjunctiva and sclera clear  NECK: Supple, No JVD, Normal thyroid  CHEST/LUNG: Clear to auscultation. Clear to percussion bilaterally; No rales, rhonchi, wheezing, or rubs  HEART: Regular rate and rhythm; No murmurs, rubs, or gallops  ABDOMEN: Soft, Nontender, Nondistended; Bowel sounds present  NERVOUS SYSTEM:  Alert & Oriented X3,    EXTREMITIES:  2+ Peripheral Pulses, No clubbing, cyanosis, or edema  SKIN: warm dry                          12.8   5.00  )-----------( 307      ( 02 Nov 2023 07:18 )             38.5     11-02    147<H>  |  109<H>  |  14  ----------------------------<  94  3.7   |  32<H>  |  0.71    Ca    8.1<L>      02 Nov 2023 07:18  Phos  3.2     11-02  Mg     1.9     11-02    TPro  6.0  /  Alb  2.4<L>  /  TBili  0.5  /  DBili  x   /  AST  19  /  ALT  21  /  AlkPhos  172<H>  11-02    LIVER FUNCTIONS - ( 02 Nov 2023 07:18 )  Alb: 2.4 g/dL / Pro: 6.0 g/dL / ALK PHOS: 172 U/L / ALT: 21 U/L DA / AST: 19 U/L / GGT: x                   CAPILLARY BLOOD GLUCOSE      RADIOLOGY & ADDITIONAL TESTS:                   PGY-1 Progress Note discussed with attending    PAGER #: [[phone]] TILL 5:00 PM  PLEASE CONTACT ON CALL TEAM:  - On Call Team (Please refer to Marck) FROM 5:00 PM - 8:30PM  - Nightfloat Team FROM 8:30 -7:30 AM      INTERVAL HPI/OVERNIGHT EVENTS: Patient examined at bedside.      REVIEW OF SYSTEMS:  CONSTITUTIONAL: No fever, weight loss, or fatigue  RESPIRATORY: No cough, wheezing, chills or hemoptysis; No shortness of breath  CARDIOVASCULAR: No chest pain, palpitations, dizziness, or leg swelling  GASTROINTESTINAL: No abdominal pain. No nausea, vomiting, or hematemesis; No diarrhea or constipation. No melena or hematochezia.  GENITOURINARY: No dysuria or hematuria, urinary frequency  NEUROLOGICAL: No headaches, memory loss, loss of strength, numbness, or tremors  SKIN: No itching, burning, rashes, or lesions     Vital Signs Last 24 Hrs  T(C): 37 (02 Nov 2023 11:07), Max: 37 (01 Nov 2023 13:10)  T(F): 98.6 (02 Nov 2023 11:07), Max: 98.6 (01 Nov 2023 13:10)  HR: 80 (02 Nov 2023 11:07) (57 - 84)  BP: 130/95 (02 Nov 2023 11:07) (92/56 - 132/85)  BP(mean): --  RR: 18 (02 Nov 2023 11:07) (15 - 23)  SpO2: 98% (02 Nov 2023 11:07) (96% - 100%)    Parameters below as of 02 Nov 2023 11:07  Patient On (Oxygen Delivery Method): nasal cannula  O2 Flow (L/min): 2      PHYSICAL EXAMINATION:  GENERAL: NAD, well built  HEAD:  Atraumatic, Normocephalic  EYES:  conjunctiva and sclera clear  NECK: Supple, No JVD, Normal thyroid  CHEST/LUNG: Clear to auscultation. Clear to percussion bilaterally; No rales, rhonchi, wheezing, or rubs  HEART: Regular rate and rhythm; No murmurs, rubs, or gallops  ABDOMEN: Soft, Nontender, Nondistended; Bowel sounds present  NERVOUS SYSTEM:  Alert & Oriented X3,    EXTREMITIES:  2+ Peripheral Pulses, No clubbing, cyanosis, or edema  SKIN: warm dry                          12.8   5.00  )-----------( 307      ( 02 Nov 2023 07:18 )             38.5     11-02    147<H>  |  109<H>  |  14  ----------------------------<  94  3.7   |  32<H>  |  0.71    Ca    8.1<L>      02 Nov 2023 07:18  Phos  3.2     11-02  Mg     1.9     11-02    TPro  6.0  /  Alb  2.4<L>  /  TBili  0.5  /  DBili  x   /  AST  19  /  ALT  21  /  AlkPhos  172<H>  11-02    LIVER FUNCTIONS - ( 02 Nov 2023 07:18 )  Alb: 2.4 g/dL / Pro: 6.0 g/dL / ALK PHOS: 172 U/L / ALT: 21 U/L DA / AST: 19 U/L / GGT: x                   CAPILLARY BLOOD GLUCOSE      RADIOLOGY & ADDITIONAL TESTS:                   PGY-1 Progress Note discussed with attending    PAGER #: [[phone]] TILL 5:00 PM  PLEASE CONTACT ON CALL TEAM:  - On Call Team (Please refer to Marck) FROM 5:00 PM - 8:30PM  - Nightfloat Team FROM 8:30 -7:30 AM      INTERVAL HPI/OVERNIGHT EVENTS: Patient examined at bedside, offers no complaints. S/p MBS today.      REVIEW OF SYSTEMS:  CONSTITUTIONAL: No fever, weight loss, or fatigue  RESPIRATORY: No cough, wheezing, chills or hemoptysis; No shortness of breath  CARDIOVASCULAR: No chest pain, palpitations, dizziness, or leg swelling  GASTROINTESTINAL: No abdominal pain. No nausea, vomiting, or hematemesis; No diarrhea or constipation. No melena or hematochezia.  GENITOURINARY: No dysuria or hematuria, urinary frequency  NEUROLOGICAL: No headaches, memory loss, loss of strength, numbness, or tremors  SKIN: No itching, burning, rashes, or lesions     Vital Signs Last 24 Hrs  T(C): 37 (02 Nov 2023 11:07), Max: 37 (01 Nov 2023 13:10)  T(F): 98.6 (02 Nov 2023 11:07), Max: 98.6 (01 Nov 2023 13:10)  HR: 80 (02 Nov 2023 11:07) (57 - 84)  BP: 130/95 (02 Nov 2023 11:07) (92/56 - 132/85)  BP(mean): --  RR: 18 (02 Nov 2023 11:07) (15 - 23)  SpO2: 98% (02 Nov 2023 11:07) (96% - 100%)    Parameters below as of 02 Nov 2023 11:07  Patient On (Oxygen Delivery Method): nasal cannula  O2 Flow (L/min): 2      PHYSICAL EXAMINATION:  GENERAL: NAD, well built  HEAD:  Atraumatic, Normocephalic  EYES:  conjunctiva and sclera clear  NECK: Supple, No JVD, Normal thyroid  CHEST/LUNG: Clear to auscultation. No rales, rhonchi, wheezing, or rubs  HEART: Regular rate and rhythm; No murmurs, rubs, or gallops  ABDOMEN: Soft, Nontender, Nondistended; Bowel sounds present  NERVOUS SYSTEM:  Alert & Oriented X1-2,    EXTREMITIES:  2+ Peripheral Pulses, No clubbing, cyanosis, or edema  SKIN: warm dry                          12.8   5.00  )-----------( 307      ( 02 Nov 2023 07:18 )             38.5     11-02    147<H>  |  109<H>  |  14  ----------------------------<  94  3.7   |  32<H>  |  0.71    Ca    8.1<L>      02 Nov 2023 07:18  Phos  3.2     11-02  Mg     1.9     11-02    TPro  6.0  /  Alb  2.4<L>  /  TBili  0.5  /  DBili  x   /  AST  19  /  ALT  21  /  AlkPhos  172<H>  11-02    LIVER FUNCTIONS - ( 02 Nov 2023 07:18 )  Alb: 2.4 g/dL / Pro: 6.0 g/dL / ALK PHOS: 172 U/L / ALT: 21 U/L DA / AST: 19 U/L / GGT: x                   CAPILLARY BLOOD GLUCOSE      RADIOLOGY & ADDITIONAL TESTS:

## 2023-11-03 NOTE — PROGRESS NOTE ADULT - PROBLEM SELECTOR PLAN 2
Patient unable to tolerate minced and moist diet  CT chest: Moderate distention of the esophagus containing internal retained ingested material  Speeech and swallow consulted- rec pureed diet  EGD (11/01): stasis in the esophagus with moderate amount of liquid food debris present, suctioned with clearance. No stricture seen. (Biopsy). Mild antral gastropathy. (Biopsy).  f/u biopsy  s/p MBS: consistent protrusion noted on posterior Upper wall, with intermittent narrowing from anterior wall at intervals  GI Dr Van consulted- recommended esophagogram Patient unable to tolerate minced and moist diet  CT chest: Moderate distention of the esophagus containing internal retained ingested material  Speeech and swallow consulted- rec pureed diet  EGD (11/01): stasis in the esophagus with moderate amount of liquid food debris present, suctioned with clearance. No stricture seen. (Biopsy). Mild antral gastropathy. (Biopsy).  f/u biopsy  s/p MBS: consistent protrusion noted on posterior Upper wall, with intermittent narrowing from anterior wall at intervals  GI Dr Van consulted

## 2023-11-03 NOTE — PROGRESS NOTE ADULT - SUBJECTIVE AND OBJECTIVE BOX
PGY-1 Progress Note discussed with attending    PAGER #: [[phone]] TILL 5:00 PM  PLEASE CONTACT ON CALL TEAM:  - On Call Team (Please refer to Marck) FROM 5:00 PM - 8:30PM  - Nightfloat Team FROM 8:30 -7:30 AM    CHIEF COMPLAINT & BRIEF HOSPITAL COURSE:    INTERVAL HPI/OVERNIGHT EVENTS:       REVIEW OF SYSTEMS:  CONSTITUTIONAL: No fever, weight loss, or fatigue  RESPIRATORY: No cough, wheezing, chills or hemoptysis; No shortness of breath  CARDIOVASCULAR: No chest pain, palpitations, dizziness, or leg swelling  GASTROINTESTINAL: No abdominal pain. No nausea, vomiting, or hematemesis; No diarrhea or constipation. No melena or hematochezia.  GENITOURINARY: No dysuria or hematuria, urinary frequency  NEUROLOGICAL: No headaches, memory loss, loss of strength, numbness, or tremors  SKIN: No itching, burning, rashes, or lesions     Vital Signs Last 24 Hrs  T(C): 36.4 (03 Nov 2023 07:30), Max: 37 (02 Nov 2023 11:07)  T(F): 97.6 (03 Nov 2023 07:30), Max: 98.6 (02 Nov 2023 11:07)  HR: 70 (03 Nov 2023 07:30) (52 - 80)  BP: 131/73 (03 Nov 2023 07:30) (104/70 - 140/77)  BP(mean): --  RR: 18 (03 Nov 2023 07:30) (17 - 18)  SpO2: 97% (03 Nov 2023 07:30) (95% - 100%)    Parameters below as of 03 Nov 2023 07:30  Patient On (Oxygen Delivery Method): nasal cannula  O2 Flow (L/min): 2      PHYSICAL EXAMINATION:  GENERAL: NAD, well built  HEAD:  Atraumatic, Normocephalic  EYES:  conjunctiva and sclera clear  NECK: Supple, No JVD, Normal thyroid  CHEST/LUNG: Clear to auscultation. Clear to percussion bilaterally; No rales, rhonchi, wheezing, or rubs  HEART: Regular rate and rhythm; No murmurs, rubs, or gallops  ABDOMEN: Soft, Nontender, Nondistended; Bowel sounds present  NERVOUS SYSTEM:  Alert & Oriented X3,    EXTREMITIES:  2+ Peripheral Pulses, No clubbing, cyanosis, or edema  SKIN: warm dry                          12.5   4.81  )-----------( 315      ( 03 Nov 2023 06:36 )             37.8     11-03    146<H>  |  107  |  11  ----------------------------<  98  3.5   |  33<H>  |  0.73    Ca    8.5      03 Nov 2023 06:36  Phos  3.0     11-03  Mg     1.9     11-03    TPro  6.0  /  Alb  2.6<L>  /  TBili  0.5  /  DBili  x   /  AST  13  /  ALT  19  /  AlkPhos  171<H>  11-03    LIVER FUNCTIONS - ( 03 Nov 2023 06:36 )  Alb: 2.6 g/dL / Pro: 6.0 g/dL / ALK PHOS: 171 U/L / ALT: 19 U/L DA / AST: 13 U/L / GGT: x                   CAPILLARY BLOOD GLUCOSE      RADIOLOGY & ADDITIONAL TESTS:                   PGY-1 Progress Note discussed with attending    PAGER #: [[phone]] TILL 5:00 PM  PLEASE CONTACT ON CALL TEAM:  - On Call Team (Please refer to Marck) FROM 5:00 PM - 8:30PM  - Nightfloat Team FROM 8:30 -7:30 AM      INTERVAL HPI/OVERNIGHT EVENTS: Patient examined at bedside, offers no complaints. Sat 97% on RA.       REVIEW OF SYSTEMS:  CONSTITUTIONAL: No fever, weight loss, or fatigue  RESPIRATORY: No cough, wheezing, chills or hemoptysis; No shortness of breath  CARDIOVASCULAR: No chest pain, palpitations, dizziness, or leg swelling  GASTROINTESTINAL: No abdominal pain. No nausea, vomiting, or hematemesis; No diarrhea or constipation. No melena or hematochezia.  GENITOURINARY: No dysuria or hematuria, urinary frequency  NEUROLOGICAL: No headaches, memory loss, loss of strength, numbness, or tremors  SKIN: No itching, burning, rashes, or lesions     Vital Signs Last 24 Hrs  T(C): 36.4 (03 Nov 2023 07:30), Max: 37 (02 Nov 2023 11:07)  T(F): 97.6 (03 Nov 2023 07:30), Max: 98.6 (02 Nov 2023 11:07)  HR: 70 (03 Nov 2023 07:30) (52 - 80)  BP: 131/73 (03 Nov 2023 07:30) (104/70 - 140/77)  BP(mean): --  RR: 18 (03 Nov 2023 07:30) (17 - 18)  SpO2: 97% (03 Nov 2023 07:30) (95% - 100%)    Parameters below as of 03 Nov 2023 07:30  Patient On (Oxygen Delivery Method): nasal cannula  O2 Flow (L/min): 2      PHYSICAL EXAMINATION:  GENERAL: NAD, well built  HEAD:  Atraumatic, Normocephalic  EYES:  conjunctiva and sclera clear  NECK: Supple, No JVD, Normal thyroid  CHEST/LUNG: Clear to auscultation. Clear to percussion bilaterally; No rales, rhonchi, wheezing, or rubs  HEART: Regular rate and rhythm; No murmurs, rubs, or gallops  ABDOMEN: Soft, Nontender, Nondistended; Bowel sounds present  NERVOUS SYSTEM:  Alert & Oriented X3,    EXTREMITIES:  2+ Peripheral Pulses, No clubbing, cyanosis, or edema  SKIN: warm dry                          12.5   4.81  )-----------( 315      ( 03 Nov 2023 06:36 )             37.8     11-03    146<H>  |  107  |  11  ----------------------------<  98  3.5   |  33<H>  |  0.73    Ca    8.5      03 Nov 2023 06:36  Phos  3.0     11-03  Mg     1.9     11-03    TPro  6.0  /  Alb  2.6<L>  /  TBili  0.5  /  DBili  x   /  AST  13  /  ALT  19  /  AlkPhos  171<H>  11-03    LIVER FUNCTIONS - ( 03 Nov 2023 06:36 )  Alb: 2.6 g/dL / Pro: 6.0 g/dL / ALK PHOS: 171 U/L / ALT: 19 U/L DA / AST: 13 U/L / GGT: x                   CAPILLARY BLOOD GLUCOSE      RADIOLOGY & ADDITIONAL TESTS:

## 2023-11-03 NOTE — PROGRESS NOTE ADULT - ASSESSMENT
88 y/o female from [place] with pmhx of Parox Afib, arthritis, HTN, CVA presenting to the ED for fall and AMS as per NH papers,UIT and borderline troponins,+CVA's.  1.PT rec HEMANT, await placement.  2.Neurology f/u.?Eliquis failure.  3.GI f/u.  4.SOB with pleural efusions-Lasix 20mg po qd.  5.CVA, PAF- eliquis, lopressor 25mg q8..  6.UTI-ABX completed.  7.HTN  8.PPI.  9.Esophogram as above.

## 2023-11-03 NOTE — PROGRESS NOTE ADULT - PROBLEM SELECTOR PLAN 5
chronic vs paroxysmal afib on ELIQUIS\  Holding AC in the setting of petechial hemorrhage in frontal lobe chronic vs paroxysmal afib on ELIQUIS  on Eliquis

## 2023-11-03 NOTE — PROGRESS NOTE ADULT - SUBJECTIVE AND OBJECTIVE BOX
Date of Service 11-03-23 @ 09:47    CHIEF COMPLAINT:Patient is a 87y old  Female who presents with a chief complaint of Fall and AMS.Pt appears comfortable.    	  REVIEW OF SYSTEMS:  CONSTITUTIONAL: No fever, weight loss, or fatigue  EYES: No eye pain, visual disturbances, or discharge  ENT:  No difficulty hearing, tinnitus, vertigo; No sinus or throat pain  NECK: No pain or stiffness  RESPIRATORY: No cough, wheezing, chills or hemoptysis; No Shortness of Breath  CARDIOVASCULAR: No chest pain, palpitations, passing out, dizziness, or leg swelling  GASTROINTESTINAL: No abdominal or epigastric pain. No nausea, vomiting, or hematemesis; No diarrhea or constipation. No melena or hematochezia.  GENITOURINARY: No dysuria, frequency, hematuria, or incontinence  NEUROLOGICAL: No headaches, memory loss, loss of strength, numbness, or tremors  SKIN: No itching, burning, rashes, or lesions   LYMPH Nodes: No enlarged glands  ENDOCRINE: No heat or cold intolerance; No hair loss  MUSCULOSKELETAL: No joint pain or swelling; No muscle, back, or extremity pain  PSYCHIATRIC: No depression, anxiety, mood swings, or difficulty sleeping  HEME/LYMPH: No easy bruising, or bleeding gums  ALLERGY AND IMMUNOLOGIC: No hives or eczema	    PHYSICAL EXAM:  T(C): 36.4 (11-03-23 @ 07:30), Max: 37 (11-02-23 @ 11:07)  HR: 70 (11-03-23 @ 07:30) (52 - 80)  BP: 131/73 (11-03-23 @ 07:30) (104/70 - 140/77)  RR: 18 (11-03-23 @ 07:30) (17 - 18)  SpO2: 97% (11-03-23 @ 07:30) (95% - 100%)  Wt(kg): --  I&O's Summary    02 Nov 2023 07:01  -  03 Nov 2023 07:00  --------------------------------------------------------  IN: 850 mL / OUT: 1200 mL / NET: -350 mL        Appearance: Normal	  HEENT:   Normal oral mucosa, PERRL, EOMI	  Lymphatic: No lymphadenopathy  Cardiovascular: Normal S1 S2, No JVD, No murmurs, No edema  Respiratory: Lungs clear to auscultation	  Psychiatry: A & O x 3, Mood & affect appropriate  Gastrointestinal:  Soft, Non-tender, + BS	  Skin: No rashes, No ecchymoses, No cyanosis	  Neurologic: Non-focal  Extremities: Normal range of motion, No clubbing, cyanosis or edema  Vascular: Peripheral pulses palpable 2+ bilaterally    MEDICATIONS  (STANDING):  apixaban 5 milliGRAM(s) Oral two times a day  atorvastatin 80 milliGRAM(s) Oral at bedtime  dronedarone 400 milliGRAM(s) Oral two times a day  ergocalciferol 93023 Unit(s) Oral <User Schedule>  furosemide    Tablet 20 milliGRAM(s) Oral daily  lactated ringers. 1000 milliLiter(s) (50 mL/Hr) IV Continuous <Continuous>  metoprolol tartrate 25 milliGRAM(s) Oral every 8 hours  nystatin Powder 1 Application(s) Topical every 12 hours  pantoprazole  Injectable 40 milliGRAM(s) IV Push daily        LABS:	 	                       12.5   4.81  )-----------( 315      ( 03 Nov 2023 06:36 )             37.8     11-03    146<H>  |  107  |  11  ----------------------------<  98  3.5   |  33<H>  |  0.73    Ca    8.5      03 Nov 2023 06:36  Phos  3.0     11-03  Mg     1.9     11-03    TPro  6.0  /  Alb  2.6<L>  /  TBili  0.5  /  DBili  x   /  AST  13  /  ALT  19  /  AlkPhos  171<H>  11-03    proBNP:   Lipid Profile: Cholesterol 126  LDL --  HDL 63  TG 67  Ldl calc 50  Ratio --    HgA1c:   TSH: Thyroid Stimulating Hormone, Serum: 1.19 uU/mL (10-29 @ 07:50)      	        < from: Xray Cinesophagram Swallow Function w/ Contrast (11.02.23 @ 11:33) >  ACC: [id number] EXAM:  XR SWAL FUNC EMELI VID CON STDY   ORDERED BY: RUTH ROMANO     PROCEDURE DATE:  11/02/2023          INTERPRETATION:  Modified barium swallow    HISTORY: Dysphagia    A cine esophagram was performed in lateral projection after the patient   was given multiple food substances mixed with barium. The study was   performed under supervision of the speech pathology staff.    Interpretation: There is an irregular outpouching along the anterior   surface of the cervical esophagus seen best on image 101 of series 3.   There also is a smooth posterior impression of the cervical esophagus at   the C5 level compatible with cricopharyngeal bar or impression from   hypertrophic bony spurring at C5-6.    There was no evidence of laryngeal penetration or aspiration.    IMPRESSION: Irregular outpouching indicating ulceration or small   diverticulum as noted. Consider endoscopic evaluation for further   information.    --- End of Report ---            DALY VILLALBA MD; Attending Interventional Radiologist  This document has been electronically signed. Nov 2 2023  1:29PM    < end of copied text >

## 2023-11-03 NOTE — PROGRESS NOTE ADULT - ASSESSMENT
1. Dilated esophagus  2. S/p EGD  3. Esophagitis  4. Gastritis  5. Esophageal diverticulum    Suggestions:    1. Diet as tolerated  2. Aspiration precaution  3. Protonix daily  4. Avoid NSAID  5. DVT prophylaxis

## 2023-11-03 NOTE — PROGRESS NOTE ADULT - ASSESSMENT
This is an 86 y/o female from [facility] with pmhx of Afib, arthritis, HTN, CVA presenting to the ED for fall and AMS as per NH papers. Patient is a poor historian, collateral history was taken from ED attending chart and NH papers. Patient states she has right sided weakness after previous CVA accident. Endorses burning on urination.  Admitted for fall and AMS.

## 2023-11-03 NOTE — PROGRESS NOTE ADULT - SUBJECTIVE AND OBJECTIVE BOX
[   ] ICU                                          [   ] CCU                                      [ X  ] Medical Floor    Patient is a 87 year old female with dilated esophagus. GI consulted to evaluate.        This is an 87 year old female, from [facility] with past medical history significant for Afib, arthritis, HTN, CVA presented to the emergency room post fall. Patient is confused and can not provide history. On admission patient found to have dilated esophagus on CT-Scan of chest. No abdominal pain, nausea, vomiting, hematemesis, hematochezia, melena, fever, chills, chest pain, SOB, cough, hematuria, dysuria or diarrhea reported.         Patient is comfortable. No new complaints reported, No abdominal pain, N/V, hematemesis, hematochezia, melena, fever, chills, chest pain, SOB, cough or diarrhea reported.       PAIN MANAGEMENT:  Pain Scale:                 0/10  Pain Location:         PAST MEDICAL HISTORY    Hypertension    Hyperlipidemia    Glaucoma    CVA (cerebrovascular accident)    Afib    Arthritis        PAST SURGICAL HISTORY     No significant surgical history reported        Allergies    No Known Allergies    Intolerances  None       SOCIAL HISTORY  Advanced Directives:       [ X ] Full Code       [  ] DNR  Marital Status:         [  ] M      [ X ] S      [  ] D       [  ] W  Children:       [ X ] Yes      [  ] No  Occupation:        [  ] Employed       [ X ] Unemployed       [  ] Retired  Diet:       [ X ] Regular       [  ] PEG feeding          [  ] NG tube feeding  Drug Use:           [ X ] No                    [  ] Yes  Alcohol:           [ X ] No             [  ] Yes (socially)         [  ] Yes (chronic)  Tobacco:           [  ] Yes           [ X ] No      FAMILY HISTORY  [ X ] Heart Disease            [ X ] Diabetes             [ X ] HTN             [  ] Colon Cancer             [  ] Stomach Cancer              [  ] Pancreatic Cancer        VITALS   Patient is comfortable. No new complaints reported, No abdominal pain, N/V, hematemesis, hematochezia, melena, fever, chills, chest pain, SOB, cough or diarrhea reported.    MEDICATIONS  (STANDING):  apixaban 5 milliGRAM(s) Oral two times a day  atorvastatin 80 milliGRAM(s) Oral at bedtime  dronedarone 400 milliGRAM(s) Oral two times a day  ergocalciferol 26869 Unit(s) Oral <User Schedule>  furosemide    Tablet 20 milliGRAM(s) Oral daily  lactated ringers. 1000 milliLiter(s) (50 mL/Hr) IV Continuous <Continuous>  metoprolol tartrate 25 milliGRAM(s) Oral every 8 hours  nystatin Powder 1 Application(s) Topical every 12 hours  pantoprazole  Injectable 40 milliGRAM(s) IV Push daily    MEDICATIONS  (PRN):                            12.5   4.81  )-----------( 315      ( 03 Nov 2023 06:36 )             37.8       11-03    146<H>  |  107  |  11  ----------------------------<  98  3.5   |  33<H>  |  0.73    Ca    8.5      03 Nov 2023 06:36  Phos  3.0     11-03  Mg     1.9     11-03    TPro  6.0  /  Alb  2.6<L>  /  TBili  0.5  /  DBili  x   /  AST  13  /  ALT  19  /  AlkPhos  171<H>  11-03        ACC: [id number] EXAM:  XR SWAL FUNC EMELI VID CON STDY   ORDERED BY: RUTH ROMANO     PROCEDURE DATE:  11/02/2023          INTERPRETATION:  Modified barium swallow    HISTORY: Dysphagia    A cine esophagram was performed in lateral projection after the patient   was given multiple food substances mixed with barium. The study was   performed under supervision of the speech pathology staff.    Interpretation: There is an irregular outpouching along the anterior   surface of the cervical esophagus seen best on image 101 of series 3.   There also is a smooth posterior impression of the cervical esophagus at   the C5 level compatible with cricopharyngeal bar or impression from   hypertrophic bony spurring at C5-6.    There was no evidence of laryngeal penetration or aspiration.    IMPRESSION: Irregular outpouching indicating ulceration or small   diverticulum as noted. Consider endoscopic evaluation for further   information.

## 2023-11-04 NOTE — CHART NOTE - NSCHARTNOTEFT_GEN_A_CORE
at 3:00pm    Notified by speech and swallow that the  patient can tolerate minced and moist diet. Attending notified and was giving the ok. Diet changed to Minced and moist

## 2023-11-04 NOTE — PROGRESS NOTE ADULT - ASSESSMENT
86 y/o female from [place] with pmhx of Parox Afib, arthritis, HTN, CVA presenting to the ED for fall and AMS as per NH papers,UIT and borderline troponins,+CVA's.  1.PT rec HEMANT, await placement.  2.Neurology f/u.?Eliquis failure.  3.GI f/u.  4.SOB with pleural efusions-Lasix 20mg po qd.  5.CVA, PAF- eliquis, multaq, lopressor 25mg q8..  6.UTI-ABX completed.  7.HTN  8.PPI.

## 2023-11-04 NOTE — PROGRESS NOTE ADULT - SUBJECTIVE AND OBJECTIVE BOX
Refill sent to wrong pharmacy   Date of Service 11-04-23 @ 11:03    CHIEF COMPLAINT:Patient is a 87y old  Female who presents with a chief complaint of Fall and AMS .Pt appears comfortable.    	  REVIEW OF SYSTEMS:  CONSTITUTIONAL: No fever, weight loss, or fatigue  EYES: No eye pain, visual disturbances, or discharge  ENT:  No difficulty hearing, tinnitus, vertigo; No sinus or throat pain  NECK: No pain or stiffness  RESPIRATORY: No cough, wheezing, chills or hemoptysis; No Shortness of Breath  CARDIOVASCULAR: No chest pain, palpitations, passing out, dizziness, or leg swelling  GASTROINTESTINAL: No abdominal or epigastric pain. No nausea, vomiting, or hematemesis; No diarrhea or constipation. No melena or hematochezia.  GENITOURINARY: No dysuria, frequency, hematuria, or incontinence  NEUROLOGICAL: No headaches, memory loss, loss of strength, numbness, or tremors  SKIN: No itching, burning, rashes, or lesions   LYMPH Nodes: No enlarged glands  ENDOCRINE: No heat or cold intolerance; No hair loss  MUSCULOSKELETAL: No joint pain or swelling; No muscle, back, or extremity pain  PSYCHIATRIC: No depression, anxiety, mood swings, or difficulty sleeping  HEME/LYMPH: No easy bruising, or bleeding gums  ALLERGY AND IMMUNOLOGIC: No hives or eczema	        PHYSICAL EXAM:  T(C): 36.3 (11-04-23 @ 07:52), Max: 37 (11-03-23 @ 15:14)  HR: 58 (11-04-23 @ 07:52) (45 - 61)  BP: 114/76 (11-04-23 @ 07:52) (104/66 - 131/80)  RR: 18 (11-04-23 @ 07:52) (18 - 18)  SpO2: 100% (11-04-23 @ 07:52) (98% - 100%)  Wt(kg): --  I&O's Summary    03 Nov 2023 07:01  -  04 Nov 2023 07:00  --------------------------------------------------------  IN: 100 mL / OUT: 650 mL / NET: -550 mL        Appearance: Normal	  HEENT:   Normal oral mucosa, PERRL, EOMI	  Lymphatic: No lymphadenopathy  Cardiovascular: Normal S1 S2, No JVD, No murmurs, No edema  Respiratory: Lungs clear to auscultation	  Psychiatry: A & O x 3, Mood & affect appropriate  Gastrointestinal:  Soft, Non-tender, + BS	  Skin: No rashes, No ecchymoses, No cyanosis	  Neurologic: Non-focal  Extremities: Normal range of motion, No clubbing, cyanosis or edema  Vascular: Peripheral pulses palpable 2+ bilaterally    MEDICATIONS  (STANDING):  apixaban 5 milliGRAM(s) Oral two times a day  atorvastatin 80 milliGRAM(s) Oral at bedtime  dronedarone 400 milliGRAM(s) Oral two times a day  ergocalciferol 47308 Unit(s) Oral <User Schedule>  furosemide    Tablet 20 milliGRAM(s) Oral daily  lactated ringers. 1000 milliLiter(s) (50 mL/Hr) IV Continuous <Continuous>  metoprolol tartrate 25 milliGRAM(s) Oral every 8 hours  nystatin Powder 1 Application(s) Topical every 12 hours  pantoprazole  Injectable 40 milliGRAM(s) IV Push daily  polyethylene glycol 3350 17 Gram(s) Oral two times a day  senna 2 Tablet(s) Oral at bedtime        LABS:	 	                              12.2   4.43  )-----------( 307      ( 04 Nov 2023 06:24 )             36.4     11-04    144  |  105  |  11  ----------------------------<  95  3.8   |  35<H>  |  0.75    Ca    8.5      04 Nov 2023 06:24  Phos  3.2     11-04  Mg     1.8     11-04    TPro  5.8<L>  /  Alb  2.5<L>  /  TBili  0.6  /  DBili  x   /  AST  16  /  ALT  18  /  AlkPhos  170<H>  11-04    proBNP:   Lipid Profile: Cholesterol 126  LDL --  HDL 63  TG 67  Ldl calc 50  Ratio --    HgA1c:   TSH: Thyroid Stimulating Hormone, Serum: 1.19 uU/mL (10-29 @ 07:50)

## 2023-11-04 NOTE — PROGRESS NOTE ADULT - SUBJECTIVE AND OBJECTIVE BOX
[   ] ICU                                          [   ] CCU                                      [ X  ] Medical Floor    Patient is a 87 year old female with dilated esophagus. GI consulted to evaluate.        This is an 87 year old female, from [facility] with past medical history significant for Afib, arthritis, HTN, CVA presented to the emergency room post fall. Patient is confused and can not provide history. On admission patient found to have dilated esophagus on CT-Scan of chest. No abdominal pain, nausea, vomiting, hematemesis, hematochezia, melena, fever, chills, chest pain, SOB, cough, hematuria, dysuria or diarrhea reported.         Patient is comfortable. No new complaints reported, No abdominal pain, N/V, hematemesis, hematochezia, melena, fever, chills, chest pain, SOB, cough or diarrhea reported.       PAIN MANAGEMENT:  Pain Scale:                 0/10  Pain Location:         PAST MEDICAL HISTORY    Hypertension    Hyperlipidemia    Glaucoma    CVA (cerebrovascular accident)    Afib    Arthritis        PAST SURGICAL HISTORY     No significant surgical history reported        Allergies    No Known Allergies    Intolerances  None       SOCIAL HISTORY  Advanced Directives:       [ X ] Full Code       [  ] DNR  Marital Status:         [  ] M      [ X ] S      [  ] D       [  ] W  Children:       [ X ] Yes      [  ] No  Occupation:        [  ] Employed       [ X ] Unemployed       [  ] Retired  Diet:       [ X ] Regular       [  ] PEG feeding          [  ] NG tube feeding  Drug Use:           [ X ] No                    [  ] Yes  Alcohol:           [ X ] No             [  ] Yes (socially)         [  ] Yes (chronic)  Tobacco:           [  ] Yes           [ X ] No      FAMILY HISTORY  [ X ] Heart Disease            [ X ] Diabetes             [ X ] HTN             [  ] Colon Cancer             [  ] Stomach Cancer              [  ] Pancreatic Cancer        VITALS  Vital Signs Last 24 Hrs  T(C): 36.6 (04 Nov 2023 11:49), Max: 37 (03 Nov 2023 15:14)  T(F): 97.9 (04 Nov 2023 11:49), Max: 98.6 (03 Nov 2023 15:14)  HR: 60 (04 Nov 2023 11:49) (45 - 60)  BP: 121/77 (04 Nov 2023 11:49) (104/66 - 131/80)     RR: 18 (04 Nov 2023 11:49) (18 - 18)  SpO2: 92% (04 Nov 2023 11:49) (92% - 100%)    Parameters below as of 04 Nov 2023 11:49  Patient On (Oxygen Delivery Method): room air       MEDICATIONS  (STANDING):  apixaban 5 milliGRAM(s) Oral two times a day  atorvastatin 80 milliGRAM(s) Oral at bedtime  dronedarone 400 milliGRAM(s) Oral two times a day  ergocalciferol 07475 Unit(s) Oral <User Schedule>  furosemide    Tablet 20 milliGRAM(s) Oral daily  lactated ringers. 1000 milliLiter(s) (50 mL/Hr) IV Continuous <Continuous>  metoprolol tartrate 25 milliGRAM(s) Oral every 8 hours  nystatin Powder 1 Application(s) Topical every 12 hours  pantoprazole  Injectable 40 milliGRAM(s) IV Push daily  polyethylene glycol 3350 17 Gram(s) Oral two times a day  senna 2 Tablet(s) Oral at bedtime    MEDICATIONS  (PRN):                            12.2   4.43  )-----------( 307      ( 04 Nov 2023 06:24 )             36.4       11-04    144  |  105  |  11  ----------------------------<  95  3.8   |  35<H>  |  0.75    Ca    8.5      04 Nov 2023 06:24  Phos  3.2     11-04  Mg     1.8     11-04    TPro  5.8<L>  /  Alb  2.5<L>  /  TBili  0.6  /  DBili  x   /  AST  16  /  ALT  18  /  AlkPhos  170<H>  11-04

## 2023-11-05 NOTE — PROGRESS NOTE ADULT - PROBLEM SELECTOR PLAN 7
on furosemide 20mg (LE edema)  s/p iv lasix 20mg   on lasix 20mg PO daily  on lopressor 25mg q8 Trop: 131 downtrended; likely demand ischemia

## 2023-11-05 NOTE — PROGRESS NOTE ADULT - SUBJECTIVE AND OBJECTIVE BOX
Date of Service 11-05-23 @ 10:01    CHIEF COMPLAINT:Patient is a 87y old  Female who presents with a chief complaint of Fall and AMS.Pt appears comfortable.    	  REVIEW OF SYSTEMS:  CONSTITUTIONAL: No fever, weight loss, or fatigue  EYES: No eye pain, visual disturbances, or discharge  ENT:  No difficulty hearing, tinnitus, vertigo; No sinus or throat pain  NECK: No pain or stiffness  RESPIRATORY: No cough, wheezing, chills or hemoptysis; No Shortness of Breath  CARDIOVASCULAR: No chest pain, palpitations, passing out, dizziness, or leg swelling  GASTROINTESTINAL: No abdominal or epigastric pain. No nausea, vomiting, or hematemesis; No diarrhea or constipation. No melena or hematochezia.  GENITOURINARY: No dysuria, frequency, hematuria, or incontinence  NEUROLOGICAL: No headaches, memory loss, loss of strength, numbness, or tremors  SKIN: No itching, burning, rashes, or lesions   LYMPH Nodes: No enlarged glands  ENDOCRINE: No heat or cold intolerance; No hair loss  MUSCULOSKELETAL: No joint pain or swelling; No muscle, back, or extremity pain  PSYCHIATRIC: No depression, anxiety, mood swings, or difficulty sleeping  HEME/LYMPH: No easy bruising, or bleeding gums  ALLERGY AND IMMUNOLOGIC: No hives or eczema	        PHYSICAL EXAM:  T(C): 36.5 (11-05-23 @ 07:49), Max: 36.7 (11-05-23 @ 04:30)  HR: 64 (11-05-23 @ 07:49) (51 - 64)  BP: 123/84 (11-05-23 @ 07:49) (113/66 - 163/67)  RR: 18 (11-05-23 @ 07:49) (17 - 18)  SpO2: 92% (11-05-23 @ 07:49) (88% - 100%)  Wt(kg): --  I&O's Summary    04 Nov 2023 08:01  -  05 Nov 2023 07:00  --------------------------------------------------------  IN: 0 mL / OUT: 1550 mL / NET: -1550 mL        Appearance: Normal	  HEENT:   Normal oral mucosa, PERRL, EOMI	  Lymphatic: No lymphadenopathy  Cardiovascular: Normal S1 S2, No JVD, No murmurs, No edema  Respiratory: Lungs clear to auscultation	  Psychiatry: A & O x 3, Mood & affect appropriate  Gastrointestinal:  Soft, Non-tender, + BS	  Skin: No rashes, No ecchymoses, No cyanosis	  Neurologic: Non-focal  Extremities: Normal range of motion, No clubbing, cyanosis or edema  Vascular: Peripheral pulses palpable 2+ bilaterally    MEDICATIONS  (STANDING):  apixaban 5 milliGRAM(s) Oral two times a day  atorvastatin 80 milliGRAM(s) Oral at bedtime  dronedarone 400 milliGRAM(s) Oral two times a day  ergocalciferol 20744 Unit(s) Oral <User Schedule>  furosemide    Tablet 20 milliGRAM(s) Oral daily  lactated ringers. 1000 milliLiter(s) (50 mL/Hr) IV Continuous <Continuous>  metoprolol tartrate 25 milliGRAM(s) Oral every 8 hours  nystatin Powder 1 Application(s) Topical every 12 hours  pantoprazole  Injectable 40 milliGRAM(s) IV Push daily  polyethylene glycol 3350 17 Gram(s) Oral two times a day  senna 2 Tablet(s) Oral at bedtime          	  	  LABS:	 	                          13.0   4.40  )-----------( 343      ( 05 Nov 2023 05:40 )             38.2     11-05    141  |  102  |  9   ----------------------------<  94  3.7   |  36<H>  |  0.73    Ca    8.6      05 Nov 2023 05:40  Phos  3.2     11-05  Mg     1.8     11-05    TPro  6.2  /  Alb  2.7<L>  /  TBili  0.6  /  DBili  x   /  AST  15  /  ALT  23  /  AlkPhos  171<H>  11-05    proBNP:   Lipid Profile: Cholesterol 126  LDL --  HDL 63  TG 67  Ldl calc 50  Ratio --    HgA1c:   TSH: Thyroid Stimulating Hormone, Serum: 1.19 uU/mL (10-29 @ 07:50)      	      Specimen(s) Submitted   1 Stomach biopsy   2 Distal esophagus   3 Proximal esophagus   Final Diagnosis   1. Stomach, biopsy :   - Moderate chronic gastritis with focal intestinal/goblet cell   metaplasia. Negative for   dysplasia   - Negative for Helicobacter pylori (cresyl violet stain)   2. Distal esophagus, biopsy:   - Squamous esophageal mucosa with reflux changes.   - Columnar mucosa is not present in the submitted tissue   - Special PAS stain is negative for fungal organisms. The control   is satisfactory.   3. Proximal esophagus, biopsy:   - Erosive Candida esophagitis   - Special PAS stain is positive for yeast and hyphae consistent   with Candida species.   Verified by: Thanh Parrish MD

## 2023-11-05 NOTE — DISCHARGE NOTE PROVIDER - NSDCCPCAREPLAN_GEN_ALL_CORE_FT
PRINCIPAL DISCHARGE DIAGNOSIS  Diagnosis: CVA (cerebrovascular accident)  Assessment and Plan of Treatment: You presented with altered mental status and right sided weakness. CT head showed age-indeterminate left MCA territorial infarct. MRI showed acute and subacute infarcts and petechial hemorrhage. ECHO showed PFO, normal LVEF of 62%. The cause of stroke is most likely embolic. Neuro Dr Preston was consulted. Resumed your Eliquis. Physical therapy was consulted, recommended ? HEMANT/ LTC.      SECONDARY DISCHARGE DIAGNOSES  Diagnosis: AMS (altered mental status)  Assessment and Plan of Treatment: You presented with altered mental status most likely secondary to urinary tract infection.    Diagnosis: Chronic atrial fibrillation  Assessment and Plan of Treatment: You have history of chronic atrial fibrillation. On eliquisa and lopressor at home. PLEASE CONTINUE ELIQUIS AND LOPRESSOR. Please follow up with your PCP and Cardiologist after 1-2 weeks of discharge.    Diagnosis: Dysphagia  Assessment and Plan of Treatment: You were found to have difficulty swallowing which increased your risk for aspiration. Speeh and swallow was consulted, recommended minced and moist diet. GI Dr Van was consulted, Endoscopy wwas done which showed retained food particles in the esophagus, which was suctioned with clearance. Mild antral gastropathy. Biopsy showed erosive candida esophagitis.    Diagnosis: Candida esophagitis  Assessment and Plan of Treatment: Biopsy of upper esophagus showed Erosive Candida esophagitis. Started fluconazole 400mg qdx14 day.    Diagnosis: Acute UTI  Assessment and Plan of Treatment: You presented with urinary tract infection as evident by burning micturation. Urine analysis was positive and urine culture grew Ecoli. You completed your course of Antibiotics. Please stay hydrated.    Diagnosis: HTN (hypertension)  Assessment and Plan of Treatment: You have a history of Hypertension. On this admission, your Blood Pressure was adequately controlled with metoprolol and Lasix  Your blood pressure target is 120-140/80-90, maintain healthy lifestyle, low salt diet, avoid fatty food.  Notify your doctor if you have any of the following symptoms:   (Dizziness, Lightheadedness, Blurry vision, Headache, Chest pain, Shortness of breath.)  Please continue taking your home medications and follow-up with your PCP in 1 week from discharge to adjust medications as needed.       PRINCIPAL DISCHARGE DIAGNOSIS  Diagnosis: CVA (cerebrovascular accident)  Assessment and Plan of Treatment: You presented with altered mental status and right sided weakness. CT head showed age-indeterminate left MCA territorial infarct. MRI showed acute and subacute infarcts and petechial hemorrhage. ECHO showed PFO, normal LVEF of 62%. The cause of stroke is most likely embolic. Neuro Dr Preston was consulted. Resumed your Eliquis. Physical therapy was consulted, recommended a skilled nursing facility to which you are being discharged.      SECONDARY DISCHARGE DIAGNOSES  Diagnosis: AMS (altered mental status)  Assessment and Plan of Treatment: You presented with altered mental status most likely secondary to urinary tract infection, which has been treated.    Diagnosis: Candida esophagitis  Assessment and Plan of Treatment: Biopsy of upper esophagus showed Erosive Candida esophagitis. Started fluconazole 400mg qdx14 days to be done on the 11/19/2023    Diagnosis: Dysphagia  Assessment and Plan of Treatment: You were found to have difficulty swallowing which increased your risk for aspiration. Speeh and swallow was consulted, recommended minced and moist diet. GI Dr Van was consulted, Endoscopy wwas done which showed retained food particles in the esophagus, which was suctioned with clearance. Mild antral gastropathy. Biopsy showed erosive candida esophagitis, for which you are to continue to be treated with FLuconazole 400mg qd for 14 days.    Diagnosis: Chronic atrial fibrillation  Assessment and Plan of Treatment: You have history of chronic atrial fibrillation. On eliquisa and lopressor at home. PLEASE CONTINUE ELIQUIS AND LOPRESSOR. Please follow up with your PCP and Cardiologist after 1-2 weeks of discharge.    Diagnosis: Acute UTI  Assessment and Plan of Treatment: You presented with urinary tract infection as evident by burning micturation. Urine analysis was positive and urine culture grew Ecoli. You completed your course of Antibiotics. Please stay hydrated.    Diagnosis: HTN (hypertension)  Assessment and Plan of Treatment: You have a history of Hypertension. On this admission, your Blood Pressure was adequately controlled with metoprolol and Lasix  Your blood pressure target is 120-140/80-90, maintain healthy lifestyle, low salt diet, avoid fatty food.  Notify your doctor if you have any of the following symptoms:   (Dizziness, Lightheadedness, Blurry vision, Headache, Chest pain, Shortness of breath.)  Please continue taking your home medications and follow-up with your PCP in 1 week from discharge to adjust medications as needed.      Diagnosis: Pleural effusion  Assessment and Plan of Treatment: You were found with some water around your lungs and advised to continue to take the water pill to flush it out. Please continue taking Lasix 20mg daily and follow up with your Cardiologist to continue to assess the effusions.

## 2023-11-05 NOTE — PROGRESS NOTE ADULT - PROBLEM SELECTOR PLAN 1
hx CVA on plavix and atorvastatin   unknown residual deficit  R sided upper and lower extremity weakness, slurred speech    code stroke in ED with NIHSS 6  CTH age indeterminant left MCA  MRI: Acute/subacute infarction within the posterior high left frontal lobe and along the left corticospinal tract. No gross hemorrhagic transformation. Petechial hemorrhagic transformation within the posterior left frontal lobe is a   possibility   Tele monitoring  Echo with bubble study: Normal LVEF 62 %, PFO/ ASD  Pt not rTPA candidate on DOAC  PT consult- rec long term care  Neuro checks q4  Neuro Consulted Dr. Preston hx CVA on plavix and atorvastatin   unknown residual deficit  R sided upper and lower extremity weakness, slurred speech    code stroke in ED with NIHSS 6  CTH age indeterminant left MCA  MRI: Acute/subacute infarction within the posterior high left frontal lobe and along the left corticospinal tract. No gross hemorrhagic transformation. Petechial hemorrhagic transformation within the posterior left frontal lobe is a   possibility   Echo with bubble study: Normal LVEF 62 %, PFO/ ASD  discontinued tele  Pt not rTPA candidate on DOAC  PT consult- rec long term care  Neuro checks q4  Neuro Consulted Dr. Preston

## 2023-11-05 NOTE — PROGRESS NOTE ADULT - SUBJECTIVE AND OBJECTIVE BOX
PGY-1 Progress Note discussed with attending    PAGER #: [[phone]] TILL 5:00 PM  PLEASE CONTACT ON CALL TEAM:  - On Call Team (Please refer to Mrack) FROM 5:00 PM - 8:30PM  - Nightfloat Team FROM 8:30 -7:30 AM    CHIEF COMPLAINT & BRIEF HOSPITAL COURSE:    INTERVAL HPI/OVERNIGHT EVENTS:       REVIEW OF SYSTEMS:  CONSTITUTIONAL: No fever, weight loss, or fatigue  RESPIRATORY: No cough, wheezing, chills or hemoptysis; No shortness of breath  CARDIOVASCULAR: No chest pain, palpitations, dizziness, or leg swelling  GASTROINTESTINAL: No abdominal pain. No nausea, vomiting, or hematemesis; No diarrhea or constipation. No melena or hematochezia.  GENITOURINARY: No dysuria or hematuria, urinary frequency  NEUROLOGICAL: No headaches, memory loss, loss of strength, numbness, or tremors  SKIN: No itching, burning, rashes, or lesions     Vital Signs Last 24 Hrs  T(C): 36.5 (05 Nov 2023 07:49), Max: 36.7 (05 Nov 2023 04:30)  T(F): 97.7 (05 Nov 2023 07:49), Max: 98.1 (05 Nov 2023 04:30)  HR: 64 (05 Nov 2023 07:49) (51 - 64)  BP: 123/84 (05 Nov 2023 07:49) (113/66 - 163/67)  BP(mean): --  RR: 18 (05 Nov 2023 07:49) (17 - 18)  SpO2: 92% (05 Nov 2023 07:49) (88% - 100%)    Parameters below as of 05 Nov 2023 07:49  Patient On (Oxygen Delivery Method): room air        PHYSICAL EXAMINATION:  GENERAL: NAD, well built  HEAD:  Atraumatic, Normocephalic  EYES:  conjunctiva and sclera clear  NECK: Supple, No JVD, Normal thyroid  CHEST/LUNG: Clear to auscultation. Clear to percussion bilaterally; No rales, rhonchi, wheezing, or rubs  HEART: Regular rate and rhythm; No murmurs, rubs, or gallops  ABDOMEN: Soft, Nontender, Nondistended; Bowel sounds present  NERVOUS SYSTEM:  Alert & Oriented X3,    EXTREMITIES:  2+ Peripheral Pulses, No clubbing, cyanosis, or edema  SKIN: warm dry                          13.0   4.40  )-----------( 343      ( 05 Nov 2023 05:40 )             38.2     11-05    141  |  102  |  9   ----------------------------<  94  3.7   |  36<H>  |  0.73    Ca    8.6      05 Nov 2023 05:40  Phos  3.2     11-05  Mg     1.8     11-05    TPro  6.2  /  Alb  2.7<L>  /  TBili  0.6  /  DBili  x   /  AST  15  /  ALT  23  /  AlkPhos  171<H>  11-05    LIVER FUNCTIONS - ( 05 Nov 2023 05:40 )  Alb: 2.7 g/dL / Pro: 6.2 g/dL / ALK PHOS: 171 U/L / ALT: 23 U/L DA / AST: 15 U/L / GGT: x                   CAPILLARY BLOOD GLUCOSE      RADIOLOGY & ADDITIONAL TESTS:                   PGY-1 Progress Note discussed with attending    PAGER #: [[phone]] TILL 5:00 PM  PLEASE CONTACT ON CALL TEAM:  - On Call Team (Please refer to Marck) FROM 5:00 PM - 8:30PM  - Nightfloat Team FROM 8:30 -7:30 AM      INTERVAL HPI/OVERNIGHT EVENTS:       REVIEW OF SYSTEMS:  CONSTITUTIONAL: No fever, weight loss, or fatigue  RESPIRATORY: No cough, wheezing, chills or hemoptysis; No shortness of breath  CARDIOVASCULAR: No chest pain, palpitations, dizziness, or leg swelling  GASTROINTESTINAL: No abdominal pain. No nausea, vomiting, or hematemesis; No diarrhea or constipation. No melena or hematochezia.  GENITOURINARY: No dysuria or hematuria, urinary frequency  NEUROLOGICAL: No headaches, memory loss, loss of strength, numbness, or tremors  SKIN: No itching, burning, rashes, or lesions     Vital Signs Last 24 Hrs  T(C): 36.5 (05 Nov 2023 07:49), Max: 36.7 (05 Nov 2023 04:30)  T(F): 97.7 (05 Nov 2023 07:49), Max: 98.1 (05 Nov 2023 04:30)  HR: 64 (05 Nov 2023 07:49) (51 - 64)  BP: 123/84 (05 Nov 2023 07:49) (113/66 - 163/67)  BP(mean): --  RR: 18 (05 Nov 2023 07:49) (17 - 18)  SpO2: 92% (05 Nov 2023 07:49) (88% - 100%)    Parameters below as of 05 Nov 2023 07:49  Patient On (Oxygen Delivery Method): room air        PHYSICAL EXAMINATION:  GENERAL: NAD, well built  HEAD:  Atraumatic, Normocephalic  EYES:  conjunctiva and sclera clear  NECK: Supple, No JVD, Normal thyroid  CHEST/LUNG: Clear to auscultation. Clear to percussion bilaterally; No rales, rhonchi, wheezing, or rubs  HEART: Regular rate and rhythm; No murmurs, rubs, or gallops  ABDOMEN: Soft, Nontender, Nondistended; Bowel sounds present  NERVOUS SYSTEM:  Alert & Oriented X3,    EXTREMITIES:  2+ Peripheral Pulses, No clubbing, cyanosis, or edema  SKIN: warm dry                          13.0   4.40  )-----------( 343      ( 05 Nov 2023 05:40 )             38.2     11-05    141  |  102  |  9   ----------------------------<  94  3.7   |  36<H>  |  0.73    Ca    8.6      05 Nov 2023 05:40  Phos  3.2     11-05  Mg     1.8     11-05    TPro  6.2  /  Alb  2.7<L>  /  TBili  0.6  /  DBili  x   /  AST  15  /  ALT  23  /  AlkPhos  171<H>  11-05    LIVER FUNCTIONS - ( 05 Nov 2023 05:40 )  Alb: 2.7 g/dL / Pro: 6.2 g/dL / ALK PHOS: 171 U/L / ALT: 23 U/L DA / AST: 15 U/L / GGT: x                   CAPILLARY BLOOD GLUCOSE      RADIOLOGY & ADDITIONAL TESTS:                   PGY-1 Progress Note discussed with attending    PAGER #: [[phone]] TILL 5:00 PM  PLEASE CONTACT ON CALL TEAM:  - On Call Team (Please refer to Marck) FROM 5:00 PM - 8:30PM  - Nightfloat Team FROM 8:30 -7:30 AM      INTERVAL HPI/OVERNIGHT EVENTS: Patient examined at bedside, offers no complaints.      REVIEW OF SYSTEMS:  CONSTITUTIONAL: No fever, weight loss, or fatigue  RESPIRATORY: No cough, wheezing, chills or hemoptysis; No shortness of breath  CARDIOVASCULAR: No chest pain, palpitations, dizziness, or leg swelling  GASTROINTESTINAL: No abdominal pain. No nausea, vomiting, or hematemesis; No diarrhea or constipation. No melena or hematochezia.  GENITOURINARY: No dysuria or hematuria, urinary frequency  NEUROLOGICAL: No headaches, memory loss, loss of strength, numbness, or tremors  SKIN: No itching, burning, rashes, or lesions     Vital Signs Last 24 Hrs  T(C): 36.5 (05 Nov 2023 07:49), Max: 36.7 (05 Nov 2023 04:30)  T(F): 97.7 (05 Nov 2023 07:49), Max: 98.1 (05 Nov 2023 04:30)  HR: 64 (05 Nov 2023 07:49) (51 - 64)  BP: 123/84 (05 Nov 2023 07:49) (113/66 - 163/67)  BP(mean): --  RR: 18 (05 Nov 2023 07:49) (17 - 18)  SpO2: 92% (05 Nov 2023 07:49) (88% - 100%)    Parameters below as of 05 Nov 2023 07:49  Patient On (Oxygen Delivery Method): room air        PHYSICAL EXAMINATION:  GENERAL: NAD, well built  HEAD:  Atraumatic, Normocephalic  EYES:  conjunctiva and sclera clear  NECK: Supple, No JVD, Normal thyroid  CHEST/LUNG: Clear to auscultation. No rales, rhonchi, wheezing, or rubs  HEART: Regular rate and rhythm; No murmurs, rubs, or gallops  ABDOMEN: Soft, Nontender, Nondistended; Bowel sounds present  NERVOUS SYSTEM:  Alert & Oriented X1-2, RLE & RUE 3/5, Sensation intact   EXTREMITIES:  2+ Peripheral Pulses, No clubbing, cyanosis, or edema  SKIN: warm dry                          13.0   4.40  )-----------( 343      ( 05 Nov 2023 05:40 )             38.2     11-05    141  |  102  |  9   ----------------------------<  94  3.7   |  36<H>  |  0.73    Ca    8.6      05 Nov 2023 05:40  Phos  3.2     11-05  Mg     1.8     11-05    TPro  6.2  /  Alb  2.7<L>  /  TBili  0.6  /  DBili  x   /  AST  15  /  ALT  23  /  AlkPhos  171<H>  11-05    LIVER FUNCTIONS - ( 05 Nov 2023 05:40 )  Alb: 2.7 g/dL / Pro: 6.2 g/dL / ALK PHOS: 171 U/L / ALT: 23 U/L DA / AST: 15 U/L / GGT: x                   CAPILLARY BLOOD GLUCOSE      RADIOLOGY & ADDITIONAL TESTS:

## 2023-11-05 NOTE — PROGRESS NOTE ADULT - PROBLEM SELECTOR PLAN 8
on Eliquis on furosemide 20mg (LE edema)  s/p iv lasix 20mg   on lasix 20mg PO daily  on lopressor 25mg q8

## 2023-11-05 NOTE — DISCHARGE NOTE PROVIDER - NSDCMRMEDTOKEN_GEN_ALL_CORE_FT
atorvastatin 40 mg oral tablet: 1 tab(s) orally once a day  clopidogrel 75 mg oral tablet: 1 tab(s) orally once a day  diclofenac 1% topical gel: Apply topically to affected area 2 times a day as needed for shoulder pain  Eliquis 5 mg oral tablet: 1 tab(s) orally 2 times a day  furosemide 20 mg oral tablet: 1 tab(s) orally 3 times a week Tuesday, Thursday, Saturday for LE edema  latanoprost ophthalmic: 1 drop(s) to each affected eye once a day  lidocaine 4% topical film: Apply topically to affected area as needed for lower back pain  pantoprazole 20 mg oral delayed release tablet: 1 tab(s) orally once a day  polyethylene glycol 3350 oral kit: 17 gram(s) orally once a day  Senna 8.6 mg oral tablet: 2 tab(s) orally once a day (at bedtime)   apixaban 5 mg oral tablet: 1 tab(s) orally 2 times a day  atorvastatin 40 mg oral tablet: 1 tab(s) orally once a day  diclofenac 1% topical gel: Apply topically to affected area 2 times a day as needed for shoulder pain  ergocalciferol 1.25 mg (50,000 intl units) oral capsule: 1 cap(s) orally once a week 10 weeks total  fluconazole 200 mg oral tablet: 2 tab(s) orally once a day  furosemide 20 mg oral tablet: 1 tab(s) orally 3 times a week Tuesday, Thursday, Saturday for LE edema  latanoprost ophthalmic: 1 drop(s) to each affected eye once a day  lidocaine 4% topical film: Apply topically to affected area as needed for lower back pain  metoprolol tartrate 25 mg oral tablet: 1 tab(s) orally every 8 hours  nystatin 100,000 units/g topical powder: 1 Apply topically to affected area every 12 hours  pantoprazole 20 mg oral delayed release tablet: 1 tab(s) orally once a day  polyethylene glycol 3350 oral kit: 17 gram(s) orally once a day  senna leaf extract oral tablet: 2 tab(s) orally once a day (at bedtime)

## 2023-11-05 NOTE — DISCHARGE NOTE PROVIDER - CARE PROVIDER_API CALL
Jaelyn Nation  Cardiology  [address] [address]  Phone: [phone]  Fax: [phone]  Established Patient  Follow Up Time:

## 2023-11-05 NOTE — DISCHARGE NOTE PROVIDER - HOSPITAL COURSE
86 y/o female from [facility] with pmhx of Afib, arthritis, HTN, CVA presenting to the ED for fall and AMS as per NH papers. Patient is a poor historian, collateral history was taken from ED attending chart and NH papers. Patient states she has right sided weakness after previous CVA accident. Endorses burning on urination. s/p 3 day course of Antibiotics. MRI showed acute and subacute infarcts and petechial hemorrhage. ECHO showed PFO, normal LVEF of 62%. The cause of stroke is most likely embolic. 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 ?eliquis failure. Neuro Dr Preston was consulted. Physical therapy was consulted, recommended ? HEMANT/ LTC.    Patient has history of Chronic atrial fibrillation, on Eliquis, lopressor and multaq.  hold off multaq while on fluconazole.    Patient has Dysphagia. CT chest: Moderate distention of the esophagus containing internal retained ingested material. Speeech and swallow consulted- rec minced and moist diet after evaluation. EGD (11/01): stasis in the esophagus with moderate amount of liquid food debris present, suctioned with clearance. No stricture seen. (Biopsy). Mild antral gastropathy. (Biopsy).Biopsy: erosive candida esophagitis. s/p MBS: consistent protrusion noted on posterior Upper wall, with intermittent narrowing from anterior wall at intervals  GI Dr Van consulted. 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 Started fluconazole 400mg qdx14 days.    Patient had Acute UTI presented with altered mental status and dysuria. Urine culture grew E coli. Completed course of Rocephin    Patient presented with elevated troponins which downtrended. Most likely demand ischemia of myocardium.     Patient has history of HTN (hypertension), on furosemide 20mg (LE edema) and lopressor.    Patient is stable for discharge. Patient has been advised to follow up as outpatient. Case has been discussed with the attending.   88 y/o female from [facility] with pmhx of Afib, arthritis, HTN, CVA presenting to the ED for fall and AMS as per NH papers. Patient is a poor historian, collateral history was taken from ED attending chart and NH papers. Patient states she has right sided weakness after previous CVA accident. Endorses burning on urination. s/p 3 day course of Antibiotics. MRI showed acute and subacute infarcts and petechial hemorrhage. ECHO showed PFO, normal LVEF of 62%. The cause of stroke is most likely embolic. 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 ?eliquis failure. Neuro Dr Preston was consulted. Physical therapy was consulted, recommended SNF she is going to [place].        Patient has Dysphagia. CT chest: Moderate distention of the esophagus containing internal retained ingested material. Speeech and swallow consulted- rec minced and moist diet after evaluation. EGD (11/01): stasis in the esophagus with moderate amount of liquid food debris present, suctioned with clearance. No stricture seen. (Biopsy). Mild antral gastropathy. (Biopsy).Biopsy: erosive candida esophagitis. s/p MBS: consistent protrusion noted on posterior Upper wall, with intermittent narrowing from anterior wall at intervals  GI Dr Van consulted. 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 Started fluconazole 400mg qdx14 days.    Patient has history of Chronic atrial fibrillation, on Eliquis, lopressor and multaq.  Held off multaq while on fluconazole.    Patient had Acute UTI presented with altered mental status and dysuria. Urine culture grew E coli. Completed course of Rocephin    Patient presented with elevated troponins which downtrended. Most likely demand ischemia of myocardium.     Patient has history of HTN (hypertension), on furosemide 20mg (LE edema) and lopressor.    Patient is stable for discharge. Patient has been advised to follow up as outpatient. Case has been discussed with the attending.

## 2023-11-05 NOTE — PROGRESS NOTE ADULT - PROBLEM SELECTOR PLAN 5
chronic vs paroxysmal afib on ELIQUIS  on Eliquis chronic vs paroxysmal afib on ELIQUIS  on Eliquis, lopressor and multaq  hold off multaq while on fluconazole

## 2023-11-05 NOTE — PROGRESS NOTE ADULT - SUBJECTIVE AND OBJECTIVE BOX
[   ] ICU                                          [   ] CCU                                      [ X  ] Medical Floor    Patient is a 87 year old female with dilated esophagus. GI consulted to evaluate.        This is an 87 year old female, from [facility] with past medical history significant for Afib, arthritis, HTN, CVA presented to the emergency room post fall. Patient is confused and can not provide history. On admission patient found to have dilated esophagus on CT-Scan of chest. No abdominal pain, nausea, vomiting, hematemesis, hematochezia, melena, fever, chills, chest pain, SOB, cough, hematuria, dysuria or diarrhea reported.         Patient is comfortable. No new complaints reported, No abdominal pain, N/V, hematemesis, hematochezia, melena, fever, chills, chest pain, SOB, cough or diarrhea reported.       PAIN MANAGEMENT:  Pain Scale:                 0/10  Pain Location:         PAST MEDICAL HISTORY    Hypertension    Hyperlipidemia    Glaucoma    CVA (cerebrovascular accident)    Afib    Arthritis        PAST SURGICAL HISTORY     No significant surgical history reported        Allergies    No Known Allergies    Intolerances  None       SOCIAL HISTORY  Advanced Directives:       [ X ] Full Code       [  ] DNR  Marital Status:         [  ] M      [ X ] S      [  ] D       [  ] W  Children:       [ X ] Yes      [  ] No  Occupation:        [  ] Employed       [ X ] Unemployed       [  ] Retired  Diet:       [ X ] Regular       [  ] PEG feeding          [  ] NG tube feeding  Drug Use:           [ X ] No                    [  ] Yes  Alcohol:           [ X ] No             [  ] Yes (socially)         [  ] Yes (chronic)  Tobacco:           [  ] Yes           [ X ] No      FAMILY HISTORY  [ X ] Heart Disease            [ X ] Diabetes             [ X ] HTN             [  ] Colon Cancer             [  ] Stomach Cancer              [  ] Pancreatic Cancer        VITALS  Vital Signs Last 24 Hrs  T(C): 36.7 (05 Nov 2023 11:18), Max: 36.7 (05 Nov 2023 04:30)  T(F): 98.1 (05 Nov 2023 11:18), Max: 98.1 (05 Nov 2023 04:30)  HR: 62 (05 Nov 2023 11:18) (51 - 64)  BP: 108/63 (05 Nov 2023 11:18) (108/63 - 163/67)   RR: 18 (05 Nov 2023 11:18) (17 - 18)  SpO2: 91% (05 Nov 2023 11:18) (88% - 100%)  Parameters below as of 05 Nov 2023 11:18  Patient On (Oxygen Delivery Method): room air       MEDICATIONS  (STANDING):  apixaban 5 milliGRAM(s) Oral two times a day  atorvastatin 80 milliGRAM(s) Oral at bedtime  ergocalciferol 51105 Unit(s) Oral <User Schedule>  fluconAZOLE   Tablet 400 milliGRAM(s) Oral daily  furosemide    Tablet 20 milliGRAM(s) Oral daily  lactated ringers. 1000 milliLiter(s) (50 mL/Hr) IV Continuous <Continuous>  metoprolol tartrate 25 milliGRAM(s) Oral every 8 hours  nystatin Powder 1 Application(s) Topical every 12 hours  pantoprazole  Injectable 40 milliGRAM(s) IV Push daily  polyethylene glycol 3350 17 Gram(s) Oral two times a day  senna 2 Tablet(s) Oral at bedtime    MEDICATIONS  (PRN):                            13.0   4.40  )-----------( 343      ( 05 Nov 2023 05:40 )             38.2       11-05    141  |  102  |  9   ----------------------------<  94  3.7   |  36<H>  |  0.73    Ca    8.6      05 Nov 2023 05:40  Phos  3.2     11-05  Mg     1.8     11-05    TPro  6.2  /  Alb  2.7<L>  /  TBili  0.6  /  DBili  x   /  AST  15  /  ALT  23  /  AlkPhos  171<H>  11-05

## 2023-11-05 NOTE — PROGRESS NOTE ADULT - ASSESSMENT
This is an 88 y/o female from [facility] with pmhx of Afib, arthritis, HTN, CVA presenting to the ED for fall and AMS as per NH papers. Patient is a poor historian, collateral history was taken from ED attending chart and NH papers. Patient states she has right sided weakness after previous CVA accident. Endorses burning on urination.  Admitted for fall and AMS.        This is an 88 y/o female from [facility] with pmhx of Afib, arthritis, HTN, CVA presenting to the ED for fall and AMS as per NH papers. Patient is a poor historian, collateral history was taken from ED attending chart and NH papers. Patient states she has right sided weakness after previous CVA accident. Endorses burning on urination. s/p 3 day course of Antibiotics. MRI showed acute and subacute infarcts and petechial hemorrhage. ECHO showed PFO. Admitted for fall and AMS.

## 2023-11-05 NOTE — PROGRESS NOTE ADULT - PROBLEM SELECTOR PLAN 2
Patient unable to tolerate minced and moist diet  CT chest: Moderate distention of the esophagus containing internal retained ingested material  Speeech and swallow consulted- rec pureed diet  EGD (11/01): stasis in the esophagus with moderate amount of liquid food debris present, suctioned with clearance. No stricture seen. (Biopsy). Mild antral gastropathy. (Biopsy).  f/u biopsy  s/p MBS: consistent protrusion noted on posterior Upper wall, with intermittent narrowing from anterior wall at intervals  GI Dr Van consulted Patient unable to tolerate minced and moist diet  CT chest: Moderate distention of the esophagus containing internal retained ingested material  Speeech and swallow consulted- rec minced and moist diet after evaluation  EGD (11/01): stasis in the esophagus with moderate amount of liquid food debris present, suctioned with clearance. No stricture seen. (Biopsy). Mild antral gastropathy. (Biopsy).  biopsy: erosive candida esophagitis  s/p MBS: consistent protrusion noted on posterior Upper wall, with intermittent narrowing from anterior wall at intervals  GI Dr Van consulted.

## 2023-11-05 NOTE — PROGRESS NOTE ADULT - PROBLEM SELECTOR PLAN 6
Trop: 131 downtrended; likely demand ischemia p/w ams, dysuria  UA positive  Urine culture positive for Ecoli  Rocephin  completed

## 2023-11-05 NOTE — PROGRESS NOTE ADULT - ASSESSMENT
88 y/o female from [place] with pmhx of Parox Afib, arthritis, HTN, CVA presenting to the ED for fall and AMS as per NH papers,UIT and borderline troponins,+CVA's,Candida esophagitis.  1.PT rec HEMANT, await placement.  2.Neurology f/u.?Eliquis failure.  3.GI f/u.  4.SOB with pleural efusions-Lasix 20mg po qd.  5.CVA, PAF- eliquis, d/c multaq while on floconazole, lopressor 25mg q8..  6.Candida esophagitis-fluconazole 400mg qdx14 days  7.HTN  8.PPI.

## 2023-11-05 NOTE — PROGRESS NOTE ADULT - PROBLEM SELECTOR PLAN 3
p/w AMS may be 2/2 UTI vs new CVA   UA (+), UCx Ecoli  CTH age indeterminant left MCA  MRI: as above  Patient awake and alert, A&0 X1-2(baseline) biopsy S/p EGD: erosive candida esophagitis  fluconazole 400mg qdx14 days

## 2023-11-06 NOTE — PROGRESS NOTE ADULT - CARDIOVASCULAR
no gallops/no rub/no murmur/no JVD/normal PMI/no pedal edema/irregular rate and rhythm

## 2023-11-06 NOTE — PROGRESS NOTE ADULT - SUBJECTIVE AND OBJECTIVE BOX
Date of Service 11-06-23 @ 09:23    CHIEF COMPLAINT:Patient is a 87y old  Female who presents with a chief complaint of Fall and AMS.Pt appears comfortable.    	  REVIEW OF SYSTEMS:  CONSTITUTIONAL: No fever, weight loss, or fatigue  EYES: No eye pain, visual disturbances, or discharge  ENT:  No difficulty hearing, tinnitus, vertigo; No sinus or throat pain  NECK: No pain or stiffness  RESPIRATORY: No cough, wheezing, chills or hemoptysis; No Shortness of Breath  CARDIOVASCULAR: No chest pain, palpitations, passing out, dizziness, or leg swelling  GASTROINTESTINAL: No abdominal or epigastric pain. No nausea, vomiting, or hematemesis; No diarrhea or constipation. No melena or hematochezia.  GENITOURINARY: No dysuria, frequency, hematuria, or incontinence  NEUROLOGICAL: No headaches, memory loss, loss of strength, numbness, or tremors  SKIN: No itching, burning, rashes, or lesions   LYMPH Nodes: No enlarged glands  ENDOCRINE: No heat or cold intolerance; No hair loss  MUSCULOSKELETAL: No joint pain or swelling; No muscle, back, or extremity pain  PSYCHIATRIC: No depression, anxiety, mood swings, or difficulty sleeping  HEME/LYMPH: No easy bruising, or bleeding gums  ALLERGY AND IMMUNOLOGIC: No hives or eczema	    PHYSICAL EXAM:  T(C): 36.5 (11-06-23 @ 04:56), Max: 37.1 (11-05-23 @ 16:19)  HR: 57 (11-06-23 @ 04:56) (51 - 65)  BP: 144/86 (11-06-23 @ 04:56) (108/63 - 144/86)  RR: 18 (11-06-23 @ 04:56) (18 - 18)  SpO2: 100% (11-06-23 @ 04:56) (91% - 100%)  Wt(kg): --  I&O's Summary    05 Nov 2023 07:01  -  06 Nov 2023 07:00  --------------------------------------------------------  IN: 0 mL / OUT: 800 mL / NET: -800 mL        Appearance: Normal	  HEENT:   Normal oral mucosa, PERRL, EOMI	  Lymphatic: No lymphadenopathy  Cardiovascular: Normal S1 S2, No JVD, No murmurs, No edema  Respiratory: Lungs clear to auscultation	  Psychiatry: A & O x 3, Mood & affect appropriate  Gastrointestinal:  Soft, Non-tender, + BS	  Skin: No rashes, No ecchymoses, No cyanosis	  Neurologic: Non-focal  Extremities: Normal range of motion, No clubbing, cyanosis or edema  Vascular: Peripheral pulses palpable 2+ bilaterally    MEDICATIONS  (STANDING):  apixaban 5 milliGRAM(s) Oral two times a day  atorvastatin 80 milliGRAM(s) Oral at bedtime  ergocalciferol 30796 Unit(s) Oral <User Schedule>  fluconAZOLE   Tablet 400 milliGRAM(s) Oral daily  furosemide    Tablet 20 milliGRAM(s) Oral daily  lactated ringers. 1000 milliLiter(s) (50 mL/Hr) IV Continuous <Continuous>  metoprolol tartrate 25 milliGRAM(s) Oral every 8 hours  nystatin Powder 1 Application(s) Topical every 12 hours  pantoprazole  Injectable 40 milliGRAM(s) IV Push daily  polyethylene glycol 3350 17 Gram(s) Oral two times a day  senna 2 Tablet(s) Oral at bedtime        LABS:	 	                        13.5   4.84  )-----------( 377      ( 06 Nov 2023 08:05 )             39.8     11-06    143  |  103  |  9   ----------------------------<  93  3.7   |  35<H>  |  0.81    Ca    8.4      06 Nov 2023 08:05  Phos  3.2     11-06  Mg     1.9     11-06    TPro  6.1  /  Alb  2.5<L>  /  TBili  0.7  /  DBili  x   /  AST  16  /  ALT  17  /  AlkPhos  181<H>  11-06    proBNP:   Lipid Profile: Cholesterol 126  LDL --  HDL 63  TG 67  Ldl calc 50  Ratio --    HgA1c:   TSH: Thyroid Stimulating Hormone, Serum: 1.19 uU/mL (10-29 @ 07:50)

## 2023-11-06 NOTE — PROGRESS NOTE ADULT - SUBJECTIVE AND OBJECTIVE BOX
[   ] ICU                                          [   ] CCU                                      [  X ] Medical Floor    Patient is a 87 year old female with dilated esophagus. GI consulted to evaluate.        This is an 87 year old female, from [facility] with past medical history significant for Afib, arthritis, HTN, CVA presented to the emergency room post fall. Patient is confused and can not provide history. On admission patient found to have dilated esophagus on CT-Scan of chest. No abdominal pain, nausea, vomiting, hematemesis, hematochezia, melena, fever, chills, chest pain, SOB, cough, hematuria, dysuria or diarrhea reported.         Patient is comfortable. No new complaints reported, No abdominal pain, N/V, hematemesis, hematochezia, melena, fever, chills, chest pain, SOB, cough or diarrhea reported.       PAIN MANAGEMENT:  Pain Scale:                 0/10  Pain Location:         PAST MEDICAL HISTORY    Hypertension    Hyperlipidemia    Glaucoma    CVA (cerebrovascular accident)    Afib    Arthritis        PAST SURGICAL HISTORY     No significant surgical history reported        Allergies    No Known Allergies    Intolerances  None       SOCIAL HISTORY  Advanced Directives:       [ X ] Full Code       [  ] DNR  Marital Status:         [  ] M      [ X ] S      [  ] D       [  ] W  Children:       [ X ] Yes      [  ] No  Occupation:        [  ] Employed       [ X ] Unemployed       [  ] Retired  Diet:       [ X ] Regular       [  ] PEG feeding          [  ] NG tube feeding  Drug Use:           [ X ] No                    [  ] Yes  Alcohol:           [ X ] No             [  ] Yes (socially)         [  ] Yes (chronic)  Tobacco:           [  ] Yes           [ X ] No      FAMILY HISTORY  [ X ] Heart Disease            [ X ] Diabetes             [ X ] HTN             [  ] Colon Cancer             [  ] Stomach Cancer              [  ] Pancreatic Cancer          VITALS  Vital Signs Last 24 Hrs  T(C): 36.5 (06 Nov 2023 04:56), Max: 37.1 (05 Nov 2023 16:19)  T(F): 97.7 (06 Nov 2023 04:56), Max: 98.8 (05 Nov 2023 16:19)  HR: 57 (06 Nov 2023 04:56) (51 - 65)  BP: 144/86 (06 Nov 2023 04:56) (113/58 - 144/86)     RR: 18 (06 Nov 2023 04:56) (18 - 18)  SpO2: 100% (06 Nov 2023 04:56) (91% - 100%)    Parameters below as of 06 Nov 2023 04:56  Patient On (Oxygen Delivery Method): nasal cannula  O2 Flow (L/min): 2       MEDICATIONS  (STANDING):  apixaban 5 milliGRAM(s) Oral two times a day  atorvastatin 80 milliGRAM(s) Oral at bedtime  ergocalciferol 38463 Unit(s) Oral <User Schedule>  fluconAZOLE   Tablet 400 milliGRAM(s) Oral daily  furosemide    Tablet 20 milliGRAM(s) Oral daily  lactated ringers. 1000 milliLiter(s) (50 mL/Hr) IV Continuous <Continuous>  metoprolol tartrate 25 milliGRAM(s) Oral every 8 hours  nystatin Powder 1 Application(s) Topical every 12 hours  pantoprazole  Injectable 40 milliGRAM(s) IV Push daily  polyethylene glycol 3350 17 Gram(s) Oral two times a day  senna 2 Tablet(s) Oral at bedtime    MEDICATIONS  (PRN):                            13.5   4.84  )-----------( 377      ( 06 Nov 2023 08:05 )             39.8       11-06    143  |  103  |  9   ----------------------------<  93  3.7   |  35<H>  |  0.81    Ca    8.4      06 Nov 2023 08:05  Phos  3.2     11-06  Mg     1.9     11-06    TPro  6.1  /  Alb  2.5<L>  /  TBili  0.7  /  DBili  x   /  AST  16  /  ALT  17  /  AlkPhos  181<H>  11-06

## 2023-11-06 NOTE — PROGRESS NOTE ADULT - NEUROLOGICAL
sensation intact/responds to pain

## 2023-11-06 NOTE — PROGRESS NOTE ADULT - REASON FOR ADMISSION
Fall and AMS

## 2023-11-06 NOTE — PROGRESS NOTE ADULT - NECK
supple/symmetric/no tracheal deviation
supple/symmetric/no tracheal deviation
supple/symmetric
supple/symmetric/no tracheal deviation
supple/symmetric/no tracheal deviation

## 2023-11-06 NOTE — PROGRESS NOTE ADULT - TONSILS
no redness/no swelling
no redness

## 2023-11-06 NOTE — PROGRESS NOTE ADULT - PROVIDER SPECIALTY LIST ADULT
Internal Medicine
Neurology
Internal Medicine
Gastroenterology
Gastroenterology
Internal Medicine
Gastroenterology
Internal Medicine
Gastroenterology
Internal Medicine
Gastroenterology
Gastroenterology
Internal Medicine
Internal Medicine

## 2023-11-06 NOTE — DISCHARGE NOTE NURSING/CASE MANAGEMENT/SOCIAL WORK - PATIENT PORTAL LINK FT
You can access the FollowMyHealth Patient Portal offered by [facility] by registering at the following website: [email or web address]. By joining [employer]’s FollowMyHealth portal, you will also be able to view your health information using other applications (apps) compatible with our system.

## 2023-11-06 NOTE — DISCHARGE NOTE NURSING/CASE MANAGEMENT/SOCIAL WORK - NSDCPEFALRISK_GEN_ALL_CORE
For information on Fall & Injury Prevention, visit: [email or web address] OR  [email or web address] OR  https://www.cdc.gov/steadi/patient.html

## 2023-11-06 NOTE — PROGRESS NOTE ADULT - ENMT
nose/mouth/pharynx/neck

## 2023-11-06 NOTE — PROGRESS NOTE ADULT - MUSCULOSKELETAL
no joint swelling/no joint erythema/no joint warmth/no calf tenderness

## 2023-11-06 NOTE — PROGRESS NOTE ADULT - ASSESSMENT
86 y/o female from [place] with pmhx of Parox Afib, arthritis, HTN, CVA presenting to the ED for fall and AMS as per NH papers,UIT and borderline troponins,+CVA's,Candida esophagitis.  1.PT rec HEMANT, await placement.  2.Neurology f/u.?Eliquis failure.  3.GI f/u.  4.SOB with pleural efusions-Lasix 20mg po qd.  5.CVA, PAF- eliquis, d/c multaq while on floconazole, lopressor 25mg q8..  6.Candida esophagitis-fluconazole 400mg qdx14 days  7.HTN  8.PPI.

## 2023-11-06 NOTE — PROGRESS NOTE ADULT - COMMENTS
Patient is not able to provide history

## 2023-11-06 NOTE — PROGRESS NOTE ADULT - SKIN
warm and dry/no rashes/no ulcers

## 2023-11-16 PROBLEM — B37.81 CANDIDAL ESOPHAGITIS: Status: ACTIVE | Noted: 2023-01-01

## 2023-12-17 NOTE — PROGRESS NOTE ADULT - PROBLEM SELECTOR PROBLEM 7
Prophylactic measure If you are a smoker, it is important for your health to stop smoking. Please be aware that second hand smoke is also harmful. HTN (hypertension)

## 2024-01-01 ENCOUNTER — RESULT REVIEW (OUTPATIENT)
Age: 89
End: 2024-01-01

## 2024-01-01 ENCOUNTER — INPATIENT (INPATIENT)
Facility: HOSPITAL | Age: 89
LOS: 1 days | DRG: 951 | End: 2024-04-22
Attending: INTERNAL MEDICINE | Admitting: INTERNAL MEDICINE
Payer: MEDICARE

## 2024-01-01 ENCOUNTER — INPATIENT (INPATIENT)
Facility: HOSPITAL | Age: 89
LOS: 9 days | Discharge: NURSING, REHAB OR LONG-TERM CARE | DRG: 872 | End: 2024-04-16
Attending: INTERNAL MEDICINE | Admitting: INTERNAL MEDICINE
Payer: MEDICARE

## 2024-01-01 ENCOUNTER — TRANSCRIPTION ENCOUNTER (OUTPATIENT)
Age: 89
End: 2024-01-01

## 2024-01-01 VITALS
HEART RATE: 89 BPM | DIASTOLIC BLOOD PRESSURE: 37 MMHG | OXYGEN SATURATION: 91 % | TEMPERATURE: 97 F | SYSTOLIC BLOOD PRESSURE: 80 MMHG | RESPIRATION RATE: 18 BRPM

## 2024-01-01 VITALS
WEIGHT: 199.96 LBS | DIASTOLIC BLOOD PRESSURE: 100 MMHG | OXYGEN SATURATION: 97 % | HEIGHT: 67 IN | SYSTOLIC BLOOD PRESSURE: 128 MMHG | TEMPERATURE: 98 F | RESPIRATION RATE: 18 BRPM | HEART RATE: 82 BPM

## 2024-01-01 VITALS
TEMPERATURE: 98 F | OXYGEN SATURATION: 99 % | DIASTOLIC BLOOD PRESSURE: 52 MMHG | SYSTOLIC BLOOD PRESSURE: 99 MMHG | HEART RATE: 105 BPM

## 2024-01-01 VITALS
TEMPERATURE: 97 F | RESPIRATION RATE: 18 BRPM | SYSTOLIC BLOOD PRESSURE: 126 MMHG | HEART RATE: 81 BPM | OXYGEN SATURATION: 97 % | DIASTOLIC BLOOD PRESSURE: 77 MMHG

## 2024-01-01 DIAGNOSIS — A41.9 SEPSIS, UNSPECIFIED ORGANISM: ICD-10-CM

## 2024-01-01 DIAGNOSIS — Z86.69 PERSONAL HISTORY OF OTHER DISEASES OF THE NERVOUS SYSTEM AND SENSE ORGANS: Chronic | ICD-10-CM

## 2024-01-01 DIAGNOSIS — G93.40 ENCEPHALOPATHY, UNSPECIFIED: ICD-10-CM

## 2024-01-01 DIAGNOSIS — Z51.5 ENCOUNTER FOR PALLIATIVE CARE: ICD-10-CM

## 2024-01-01 DIAGNOSIS — Z02.9 ENCOUNTER FOR ADMINISTRATIVE EXAMINATIONS, UNSPECIFIED: ICD-10-CM

## 2024-01-01 DIAGNOSIS — R06.89 OTHER ABNORMALITIES OF BREATHING: ICD-10-CM

## 2024-01-01 DIAGNOSIS — I48.91 UNSPECIFIED ATRIAL FIBRILLATION: ICD-10-CM

## 2024-01-01 DIAGNOSIS — J96.01 ACUTE RESPIRATORY FAILURE WITH HYPOXIA: ICD-10-CM

## 2024-01-01 DIAGNOSIS — Z75.8 OTHER PROBLEMS RELATED TO MEDICAL FACILITIES AND OTHER HEALTH CARE: ICD-10-CM

## 2024-01-01 DIAGNOSIS — N39.0 URINARY TRACT INFECTION, SITE NOT SPECIFIED: ICD-10-CM

## 2024-01-01 DIAGNOSIS — J69.0 PNEUMONITIS DUE TO INHALATION OF FOOD AND VOMIT: ICD-10-CM

## 2024-01-01 DIAGNOSIS — I48.20 CHRONIC ATRIAL FIBRILLATION, UNSPECIFIED: ICD-10-CM

## 2024-01-01 DIAGNOSIS — Z29.9 ENCOUNTER FOR PROPHYLACTIC MEASURES, UNSPECIFIED: ICD-10-CM

## 2024-01-01 LAB
-  AMOXICILLIN/CLAVULANIC ACID: SIGNIFICANT CHANGE UP
-  AMPICILLIN/SULBACTAM: SIGNIFICANT CHANGE UP
-  AMPICILLIN: SIGNIFICANT CHANGE UP
-  AZTREONAM: SIGNIFICANT CHANGE UP
-  CEFAZOLIN: SIGNIFICANT CHANGE UP
-  CEFEPIME: SIGNIFICANT CHANGE UP
-  CEFOXITIN: SIGNIFICANT CHANGE UP
-  CEFTRIAXONE: SIGNIFICANT CHANGE UP
-  CEFUROXIME: SIGNIFICANT CHANGE UP
-  CIPROFLOXACIN: SIGNIFICANT CHANGE UP
-  ERTAPENEM: SIGNIFICANT CHANGE UP
-  GENTAMICIN: SIGNIFICANT CHANGE UP
-  IMIPENEM: SIGNIFICANT CHANGE UP
-  LEVOFLOXACIN: SIGNIFICANT CHANGE UP
-  MEROPENEM: SIGNIFICANT CHANGE UP
-  NITROFURANTOIN: SIGNIFICANT CHANGE UP
-  PIPERACILLIN/TAZOBACTAM: SIGNIFICANT CHANGE UP
-  TOBRAMYCIN: SIGNIFICANT CHANGE UP
-  TRIMETHOPRIM/SULFAMETHOXAZOLE: SIGNIFICANT CHANGE UP
ALBUMIN SERPL ELPH-MCNC: 1.7 G/DL — LOW (ref 3.5–5)
ALBUMIN SERPL ELPH-MCNC: 2 G/DL — LOW (ref 3.5–5)
ALBUMIN SERPL ELPH-MCNC: 2 G/DL — LOW (ref 3.5–5)
ALBUMIN SERPL ELPH-MCNC: 2.3 G/DL — LOW (ref 3.5–5)
ALBUMIN SERPL ELPH-MCNC: 2.5 G/DL — LOW (ref 3.5–5)
ALP SERPL-CCNC: 126 U/L — HIGH (ref 40–120)
ALP SERPL-CCNC: 144 U/L — HIGH (ref 40–120)
ALP SERPL-CCNC: 149 U/L — HIGH (ref 40–120)
ALP SERPL-CCNC: 160 U/L — HIGH (ref 40–120)
ALP SERPL-CCNC: 211 U/L — HIGH (ref 40–120)
ALT FLD-CCNC: 10 U/L DA — SIGNIFICANT CHANGE UP (ref 10–60)
ALT FLD-CCNC: 30 U/L DA — SIGNIFICANT CHANGE UP (ref 10–60)
ALT FLD-CCNC: 32 U/L DA — SIGNIFICANT CHANGE UP (ref 10–60)
ALT FLD-CCNC: 42 U/L DA — SIGNIFICANT CHANGE UP (ref 10–60)
ALT FLD-CCNC: 62 U/L DA — HIGH (ref 10–60)
ANION GAP SERPL CALC-SCNC: 0 MMOL/L — LOW (ref 5–17)
ANION GAP SERPL CALC-SCNC: 1 MMOL/L — LOW (ref 5–17)
ANION GAP SERPL CALC-SCNC: 2 MMOL/L — LOW (ref 5–17)
ANION GAP SERPL CALC-SCNC: 3 MMOL/L — LOW (ref 5–17)
ANION GAP SERPL CALC-SCNC: 3 MMOL/L — LOW (ref 5–17)
ANION GAP SERPL CALC-SCNC: 4 MMOL/L — LOW (ref 5–17)
ANION GAP SERPL CALC-SCNC: 5 MMOL/L — SIGNIFICANT CHANGE UP (ref 5–17)
ANION GAP SERPL CALC-SCNC: 7 MMOL/L — SIGNIFICANT CHANGE UP (ref 5–17)
ANISOCYTOSIS BLD QL: SLIGHT — SIGNIFICANT CHANGE UP
APPEARANCE UR: ABNORMAL
APPEARANCE UR: ABNORMAL
APTT BLD: 44.9 SEC — HIGH (ref 24.5–35.6)
AST SERPL-CCNC: 16 U/L — SIGNIFICANT CHANGE UP (ref 10–40)
AST SERPL-CCNC: 22 U/L — SIGNIFICANT CHANGE UP (ref 10–40)
AST SERPL-CCNC: 25 U/L — SIGNIFICANT CHANGE UP (ref 10–40)
AST SERPL-CCNC: 33 U/L — SIGNIFICANT CHANGE UP (ref 10–40)
AST SERPL-CCNC: 46 U/L — HIGH (ref 10–40)
BACTERIA # UR AUTO: ABNORMAL /HPF
BACTERIA # UR AUTO: ABNORMAL /HPF
BASE EXCESS BLDA CALC-SCNC: 5.5 MMOL/L — HIGH (ref -2–3)
BASE EXCESS BLDA CALC-SCNC: 5.8 MMOL/L — HIGH (ref -2–3)
BASE EXCESS BLDA CALC-SCNC: 8.6 MMOL/L — HIGH (ref -2–3)
BASE EXCESS BLDA CALC-SCNC: SIGNIFICANT CHANGE UP MMOL/L (ref -2–3)
BASE EXCESS BLDV CALC-SCNC: 13 MMOL/L — SIGNIFICANT CHANGE UP
BASE EXCESS BLDV CALC-SCNC: 15 MMOL/L — SIGNIFICANT CHANGE UP
BASE EXCESS BLDV CALC-SCNC: 2.5 MMOL/L — SIGNIFICANT CHANGE UP
BASOPHILS # BLD AUTO: 0 K/UL — SIGNIFICANT CHANGE UP (ref 0–0.2)
BASOPHILS # BLD AUTO: 0.03 K/UL — SIGNIFICANT CHANGE UP (ref 0–0.2)
BASOPHILS # BLD AUTO: 0.04 K/UL — SIGNIFICANT CHANGE UP (ref 0–0.2)
BASOPHILS # BLD AUTO: 0.04 K/UL — SIGNIFICANT CHANGE UP (ref 0–0.2)
BASOPHILS # BLD AUTO: 0.05 K/UL — SIGNIFICANT CHANGE UP (ref 0–0.2)
BASOPHILS # BLD AUTO: 0.16 K/UL — SIGNIFICANT CHANGE UP (ref 0–0.2)
BASOPHILS NFR BLD AUTO: 0 % — SIGNIFICANT CHANGE UP (ref 0–2)
BASOPHILS NFR BLD AUTO: 0.2 % — SIGNIFICANT CHANGE UP (ref 0–2)
BASOPHILS NFR BLD AUTO: 0.3 % — SIGNIFICANT CHANGE UP (ref 0–2)
BASOPHILS NFR BLD AUTO: 1 % — SIGNIFICANT CHANGE UP (ref 0–2)
BILIRUB SERPL-MCNC: 0.5 MG/DL — SIGNIFICANT CHANGE UP (ref 0.2–1.2)
BILIRUB SERPL-MCNC: 0.6 MG/DL — SIGNIFICANT CHANGE UP (ref 0.2–1.2)
BILIRUB SERPL-MCNC: 0.9 MG/DL — SIGNIFICANT CHANGE UP (ref 0.2–1.2)
BILIRUB SERPL-MCNC: 1 MG/DL — SIGNIFICANT CHANGE UP (ref 0.2–1.2)
BILIRUB SERPL-MCNC: 1.1 MG/DL — SIGNIFICANT CHANGE UP (ref 0.2–1.2)
BILIRUB UR-MCNC: ABNORMAL
BILIRUB UR-MCNC: NEGATIVE — SIGNIFICANT CHANGE UP
BLOOD GAS COMMENTS ARTERIAL: SIGNIFICANT CHANGE UP
BUN SERPL-MCNC: 12 MG/DL — SIGNIFICANT CHANGE UP (ref 7–18)
BUN SERPL-MCNC: 13 MG/DL — SIGNIFICANT CHANGE UP (ref 7–18)
BUN SERPL-MCNC: 13 MG/DL — SIGNIFICANT CHANGE UP (ref 7–18)
BUN SERPL-MCNC: 14 MG/DL — SIGNIFICANT CHANGE UP (ref 7–18)
BUN SERPL-MCNC: 14 MG/DL — SIGNIFICANT CHANGE UP (ref 7–18)
BUN SERPL-MCNC: 20 MG/DL — HIGH (ref 7–18)
BUN SERPL-MCNC: 22 MG/DL — HIGH (ref 7–18)
BUN SERPL-MCNC: 23 MG/DL — HIGH (ref 7–18)
BUN SERPL-MCNC: 23 MG/DL — HIGH (ref 7–18)
BUN SERPL-MCNC: 27 MG/DL — HIGH (ref 7–18)
BUN SERPL-MCNC: 28 MG/DL — HIGH (ref 7–18)
BUN SERPL-MCNC: 31 MG/DL — HIGH (ref 7–18)
CA-I SERPL-SCNC: SIGNIFICANT CHANGE UP MMOL/L (ref 1.15–1.33)
CA-I SERPL-SCNC: SIGNIFICANT CHANGE UP MMOL/L (ref 1.15–1.33)
CALCIUM SERPL-MCNC: 8.1 MG/DL — LOW (ref 8.4–10.5)
CALCIUM SERPL-MCNC: 8.2 MG/DL — LOW (ref 8.4–10.5)
CALCIUM SERPL-MCNC: 8.5 MG/DL — SIGNIFICANT CHANGE UP (ref 8.4–10.5)
CALCIUM SERPL-MCNC: 8.6 MG/DL — SIGNIFICANT CHANGE UP (ref 8.4–10.5)
CALCIUM SERPL-MCNC: 8.7 MG/DL — SIGNIFICANT CHANGE UP (ref 8.4–10.5)
CALCIUM SERPL-MCNC: 8.8 MG/DL — SIGNIFICANT CHANGE UP (ref 8.4–10.5)
CALCIUM SERPL-MCNC: 8.8 MG/DL — SIGNIFICANT CHANGE UP (ref 8.4–10.5)
CALCIUM SERPL-MCNC: 8.9 MG/DL — SIGNIFICANT CHANGE UP (ref 8.4–10.5)
CALCIUM SERPL-MCNC: 9 MG/DL — SIGNIFICANT CHANGE UP (ref 8.4–10.5)
CALCIUM SERPL-MCNC: 9.1 MG/DL — SIGNIFICANT CHANGE UP (ref 8.4–10.5)
CALCIUM SERPL-MCNC: 9.1 MG/DL — SIGNIFICANT CHANGE UP (ref 8.4–10.5)
CALCIUM SERPL-MCNC: 9.3 MG/DL — SIGNIFICANT CHANGE UP (ref 8.4–10.5)
CHLORIDE SERPL-SCNC: 103 MMOL/L — SIGNIFICANT CHANGE UP (ref 96–108)
CHLORIDE SERPL-SCNC: 106 MMOL/L — SIGNIFICANT CHANGE UP (ref 96–108)
CHLORIDE SERPL-SCNC: 106 MMOL/L — SIGNIFICANT CHANGE UP (ref 96–108)
CHLORIDE SERPL-SCNC: 107 MMOL/L — SIGNIFICANT CHANGE UP (ref 96–108)
CHLORIDE SERPL-SCNC: 109 MMOL/L — HIGH (ref 96–108)
CHLORIDE SERPL-SCNC: 110 MMOL/L — HIGH (ref 96–108)
CHLORIDE SERPL-SCNC: 111 MMOL/L — HIGH (ref 96–108)
CK SERPL-CCNC: 71 U/L — SIGNIFICANT CHANGE UP (ref 21–215)
CO2 SERPL-SCNC: 29 MMOL/L — SIGNIFICANT CHANGE UP (ref 22–31)
CO2 SERPL-SCNC: 29 MMOL/L — SIGNIFICANT CHANGE UP (ref 22–31)
CO2 SERPL-SCNC: 30 MMOL/L — SIGNIFICANT CHANGE UP (ref 22–31)
CO2 SERPL-SCNC: 31 MMOL/L — SIGNIFICANT CHANGE UP (ref 22–31)
CO2 SERPL-SCNC: 31 MMOL/L — SIGNIFICANT CHANGE UP (ref 22–31)
CO2 SERPL-SCNC: 32 MMOL/L — HIGH (ref 22–31)
CO2 SERPL-SCNC: 33 MMOL/L — HIGH (ref 22–31)
CO2 SERPL-SCNC: 35 MMOL/L — HIGH (ref 22–31)
CO2 SERPL-SCNC: 38 MMOL/L — HIGH (ref 22–31)
CO2 SERPL-SCNC: 40 MMOL/L — HIGH (ref 22–31)
COLOR SPEC: SIGNIFICANT CHANGE UP
COLOR SPEC: SIGNIFICANT CHANGE UP
COMMENT - URINE: SIGNIFICANT CHANGE UP
COMMENT - URINE: SIGNIFICANT CHANGE UP
CREAT SERPL-MCNC: 0.76 MG/DL — SIGNIFICANT CHANGE UP (ref 0.5–1.3)
CREAT SERPL-MCNC: 0.79 MG/DL — SIGNIFICANT CHANGE UP (ref 0.5–1.3)
CREAT SERPL-MCNC: 0.84 MG/DL — SIGNIFICANT CHANGE UP (ref 0.5–1.3)
CREAT SERPL-MCNC: 0.86 MG/DL — SIGNIFICANT CHANGE UP (ref 0.5–1.3)
CREAT SERPL-MCNC: 0.93 MG/DL — SIGNIFICANT CHANGE UP (ref 0.5–1.3)
CREAT SERPL-MCNC: 0.93 MG/DL — SIGNIFICANT CHANGE UP (ref 0.5–1.3)
CREAT SERPL-MCNC: 0.94 MG/DL — SIGNIFICANT CHANGE UP (ref 0.5–1.3)
CREAT SERPL-MCNC: 0.98 MG/DL — SIGNIFICANT CHANGE UP (ref 0.5–1.3)
CREAT SERPL-MCNC: 1.04 MG/DL — SIGNIFICANT CHANGE UP (ref 0.5–1.3)
CREAT SERPL-MCNC: 1.04 MG/DL — SIGNIFICANT CHANGE UP (ref 0.5–1.3)
CREAT SERPL-MCNC: 1.11 MG/DL — SIGNIFICANT CHANGE UP (ref 0.5–1.3)
CREAT SERPL-MCNC: 1.12 MG/DL — SIGNIFICANT CHANGE UP (ref 0.5–1.3)
CULTURE RESULTS: ABNORMAL
CULTURE RESULTS: SIGNIFICANT CHANGE UP
DIFF PNL FLD: ABNORMAL
DIFF PNL FLD: NEGATIVE — SIGNIFICANT CHANGE UP
EGFR: 48 ML/MIN/1.73M2 — LOW
EGFR: 48 ML/MIN/1.73M2 — LOW
EGFR: 52 ML/MIN/1.73M2 — LOW
EGFR: 52 ML/MIN/1.73M2 — LOW
EGFR: 56 ML/MIN/1.73M2 — LOW
EGFR: 59 ML/MIN/1.73M2 — LOW
EGFR: 65 ML/MIN/1.73M2 — SIGNIFICANT CHANGE UP
EGFR: 67 ML/MIN/1.73M2 — SIGNIFICANT CHANGE UP
EGFR: 72 ML/MIN/1.73M2 — SIGNIFICANT CHANGE UP
EGFR: 76 ML/MIN/1.73M2 — SIGNIFICANT CHANGE UP
EOSINOPHIL # BLD AUTO: 0.01 K/UL — SIGNIFICANT CHANGE UP (ref 0–0.5)
EOSINOPHIL # BLD AUTO: 0.07 K/UL — SIGNIFICANT CHANGE UP (ref 0–0.5)
EOSINOPHIL # BLD AUTO: 0.11 K/UL — SIGNIFICANT CHANGE UP (ref 0–0.5)
EOSINOPHIL # BLD AUTO: 0.11 K/UL — SIGNIFICANT CHANGE UP (ref 0–0.5)
EOSINOPHIL # BLD AUTO: 0.13 K/UL — SIGNIFICANT CHANGE UP (ref 0–0.5)
EOSINOPHIL # BLD AUTO: 0.24 K/UL — SIGNIFICANT CHANGE UP (ref 0–0.5)
EOSINOPHIL NFR BLD AUTO: 0.1 % — SIGNIFICANT CHANGE UP (ref 0–6)
EOSINOPHIL NFR BLD AUTO: 0.4 % — SIGNIFICANT CHANGE UP (ref 0–6)
EOSINOPHIL NFR BLD AUTO: 0.6 % — SIGNIFICANT CHANGE UP (ref 0–6)
EOSINOPHIL NFR BLD AUTO: 0.9 % — SIGNIFICANT CHANGE UP (ref 0–6)
EOSINOPHIL NFR BLD AUTO: 1 % — SIGNIFICANT CHANGE UP (ref 0–6)
EOSINOPHIL NFR BLD AUTO: 1.8 % — SIGNIFICANT CHANGE UP (ref 0–6)
EPI CELLS # UR: PRESENT
EPI CELLS # UR: PRESENT
FLUAV AG NPH QL: SIGNIFICANT CHANGE UP
FLUBV AG NPH QL: SIGNIFICANT CHANGE UP
GAS PNL BLDA: SIGNIFICANT CHANGE UP
GAS PNL BLDV: 139 MMOL/L — SIGNIFICANT CHANGE UP (ref 136–145)
GAS PNL BLDV: 145 MMOL/L — SIGNIFICANT CHANGE UP (ref 136–145)
GAS PNL BLDV: SIGNIFICANT CHANGE UP
GAS PNL BLDV: SIGNIFICANT CHANGE UP
GLUCOSE BLDC GLUCOMTR-MCNC: 101 MG/DL — HIGH (ref 70–99)
GLUCOSE BLDC GLUCOMTR-MCNC: 103 MG/DL — HIGH (ref 70–99)
GLUCOSE BLDC GLUCOMTR-MCNC: 114 MG/DL — HIGH (ref 70–99)
GLUCOSE BLDC GLUCOMTR-MCNC: 117 MG/DL — HIGH (ref 70–99)
GLUCOSE BLDC GLUCOMTR-MCNC: 126 MG/DL — HIGH (ref 70–99)
GLUCOSE BLDC GLUCOMTR-MCNC: 126 MG/DL — HIGH (ref 70–99)
GLUCOSE BLDC GLUCOMTR-MCNC: 208 MG/DL — HIGH (ref 70–99)
GLUCOSE BLDC GLUCOMTR-MCNC: 37 MG/DL — CRITICAL LOW (ref 70–99)
GLUCOSE BLDC GLUCOMTR-MCNC: 59 MG/DL — LOW (ref 70–99)
GLUCOSE BLDC GLUCOMTR-MCNC: 67 MG/DL — LOW (ref 70–99)
GLUCOSE BLDC GLUCOMTR-MCNC: 74 MG/DL — SIGNIFICANT CHANGE UP (ref 70–99)
GLUCOSE BLDC GLUCOMTR-MCNC: 74 MG/DL — SIGNIFICANT CHANGE UP (ref 70–99)
GLUCOSE BLDC GLUCOMTR-MCNC: 79 MG/DL — SIGNIFICANT CHANGE UP (ref 70–99)
GLUCOSE BLDC GLUCOMTR-MCNC: 80 MG/DL — SIGNIFICANT CHANGE UP (ref 70–99)
GLUCOSE BLDC GLUCOMTR-MCNC: 91 MG/DL — SIGNIFICANT CHANGE UP (ref 70–99)
GLUCOSE BLDC GLUCOMTR-MCNC: 93 MG/DL — SIGNIFICANT CHANGE UP (ref 70–99)
GLUCOSE BLDC GLUCOMTR-MCNC: 97 MG/DL — SIGNIFICANT CHANGE UP (ref 70–99)
GLUCOSE BLDC GLUCOMTR-MCNC: 98 MG/DL — SIGNIFICANT CHANGE UP (ref 70–99)
GLUCOSE SERPL-MCNC: 113 MG/DL — HIGH (ref 70–99)
GLUCOSE SERPL-MCNC: 114 MG/DL — HIGH (ref 70–99)
GLUCOSE SERPL-MCNC: 114 MG/DL — HIGH (ref 70–99)
GLUCOSE SERPL-MCNC: 115 MG/DL — HIGH (ref 70–99)
GLUCOSE SERPL-MCNC: 120 MG/DL — HIGH (ref 70–99)
GLUCOSE SERPL-MCNC: 121 MG/DL — HIGH (ref 70–99)
GLUCOSE SERPL-MCNC: 123 MG/DL — HIGH (ref 70–99)
GLUCOSE SERPL-MCNC: 123 MG/DL — HIGH (ref 70–99)
GLUCOSE SERPL-MCNC: 128 MG/DL — HIGH (ref 70–99)
GLUCOSE SERPL-MCNC: 131 MG/DL — HIGH (ref 70–99)
GLUCOSE SERPL-MCNC: 208 MG/DL — HIGH (ref 70–99)
GLUCOSE SERPL-MCNC: 88 MG/DL — SIGNIFICANT CHANGE UP (ref 70–99)
GLUCOSE SERPL-MCNC: 89 MG/DL — SIGNIFICANT CHANGE UP (ref 70–99)
GLUCOSE SERPL-MCNC: 93 MG/DL — SIGNIFICANT CHANGE UP (ref 70–99)
GLUCOSE UR QL: NEGATIVE MG/DL — SIGNIFICANT CHANGE UP
GLUCOSE UR QL: NEGATIVE MG/DL — SIGNIFICANT CHANGE UP
GRAM STN FLD: SIGNIFICANT CHANGE UP
GRAN CASTS # UR COMP ASSIST: PRESENT
HCO3 BLDA-SCNC: 29 MMOL/L — HIGH (ref 21–28)
HCO3 BLDA-SCNC: 31 MMOL/L — HIGH (ref 21–28)
HCO3 BLDA-SCNC: 34 MMOL/L — HIGH (ref 21–28)
HCO3 BLDA-SCNC: SIGNIFICANT CHANGE UP MMOL/L (ref 21–28)
HCO3 BLDV-SCNC: 30 MMOL/L — HIGH (ref 22–29)
HCO3 BLDV-SCNC: 43 MMOL/L — HIGH (ref 22–29)
HCO3 BLDV-SCNC: 45 MMOL/L — HIGH (ref 22–29)
HCT VFR BLD CALC: 30.9 % — LOW (ref 34.5–45)
HCT VFR BLD CALC: 32.9 % — LOW (ref 34.5–45)
HCT VFR BLD CALC: 33.5 % — LOW (ref 34.5–45)
HCT VFR BLD CALC: 34.3 % — LOW (ref 34.5–45)
HCT VFR BLD CALC: 34.8 % — SIGNIFICANT CHANGE UP (ref 34.5–45)
HCT VFR BLD CALC: 35 % — SIGNIFICANT CHANGE UP (ref 34.5–45)
HCT VFR BLD CALC: 35.3 % — SIGNIFICANT CHANGE UP (ref 34.5–45)
HCT VFR BLD CALC: 35.4 % — SIGNIFICANT CHANGE UP (ref 34.5–45)
HCT VFR BLD CALC: 35.9 % — SIGNIFICANT CHANGE UP (ref 34.5–45)
HCT VFR BLD CALC: 36.7 % — SIGNIFICANT CHANGE UP (ref 34.5–45)
HCT VFR BLD CALC: 37.3 % — SIGNIFICANT CHANGE UP (ref 34.5–45)
HCT VFR BLD CALC: 43 % — SIGNIFICANT CHANGE UP (ref 34.5–45)
HGB BLD-MCNC: 10.4 G/DL — LOW (ref 11.5–15.5)
HGB BLD-MCNC: 10.8 G/DL — LOW (ref 11.5–15.5)
HGB BLD-MCNC: 11.1 G/DL — LOW (ref 11.5–15.5)
HGB BLD-MCNC: 11.1 G/DL — LOW (ref 11.5–15.5)
HGB BLD-MCNC: 11.2 G/DL — LOW (ref 11.5–15.5)
HGB BLD-MCNC: 11.3 G/DL — LOW (ref 11.5–15.5)
HGB BLD-MCNC: 11.7 G/DL — SIGNIFICANT CHANGE UP (ref 11.5–15.5)
HGB BLD-MCNC: 11.9 G/DL — SIGNIFICANT CHANGE UP (ref 11.5–15.5)
HGB BLD-MCNC: 12.1 G/DL — SIGNIFICANT CHANGE UP (ref 11.5–15.5)
HGB BLD-MCNC: 13.7 G/DL — SIGNIFICANT CHANGE UP (ref 11.5–15.5)
HOROWITZ INDEX BLDA+IHG-RTO: 40 — SIGNIFICANT CHANGE UP
HOROWITZ INDEX BLDA+IHG-RTO: 45 — SIGNIFICANT CHANGE UP
IMM GRANULOCYTES NFR BLD AUTO: 0.9 % — SIGNIFICANT CHANGE UP (ref 0–0.9)
IMM GRANULOCYTES NFR BLD AUTO: 1 % — HIGH (ref 0–0.9)
IMM GRANULOCYTES NFR BLD AUTO: 1.1 % — HIGH (ref 0–0.9)
IMM GRANULOCYTES NFR BLD AUTO: 1.3 % — HIGH (ref 0–0.9)
IMM GRANULOCYTES NFR BLD AUTO: 6.4 % — HIGH (ref 0–0.9)
INR BLD: 1.69 RATIO — HIGH (ref 0.85–1.18)
KETONES UR-MCNC: ABNORMAL MG/DL
KETONES UR-MCNC: NEGATIVE MG/DL — SIGNIFICANT CHANGE UP
LACTATE BLDV-MCNC: 1.9 MMOL/L — SIGNIFICANT CHANGE UP (ref 0.5–2)
LACTATE BLDV-MCNC: 2.7 MMOL/L — HIGH (ref 0.5–2)
LACTATE SERPL-SCNC: 1.1 MMOL/L — SIGNIFICANT CHANGE UP (ref 0.7–2)
LACTATE SERPL-SCNC: 1.2 MMOL/L — SIGNIFICANT CHANGE UP (ref 0.7–2)
LACTATE SERPL-SCNC: 2.7 MMOL/L — HIGH (ref 0.7–2)
LEUKOCYTE ESTERASE UR-ACNC: ABNORMAL
LEUKOCYTE ESTERASE UR-ACNC: ABNORMAL
LIDOCAIN IGE QN: 12 U/L — LOW (ref 13–75)
LYMPHOCYTES # BLD AUTO: 0.5 K/UL — LOW (ref 1–3.3)
LYMPHOCYTES # BLD AUTO: 0.69 K/UL — LOW (ref 1–3.3)
LYMPHOCYTES # BLD AUTO: 0.72 K/UL — LOW (ref 1–3.3)
LYMPHOCYTES # BLD AUTO: 0.88 K/UL — LOW (ref 1–3.3)
LYMPHOCYTES # BLD AUTO: 1.06 K/UL — SIGNIFICANT CHANGE UP (ref 1–3.3)
LYMPHOCYTES # BLD AUTO: 1.07 K/UL — SIGNIFICANT CHANGE UP (ref 1–3.3)
LYMPHOCYTES # BLD AUTO: 2.7 % — LOW (ref 13–44)
LYMPHOCYTES # BLD AUTO: 5 % — LOW (ref 13–44)
LYMPHOCYTES # BLD AUTO: 5.1 % — LOW (ref 13–44)
LYMPHOCYTES # BLD AUTO: 6.6 % — LOW (ref 13–44)
LYMPHOCYTES # BLD AUTO: 8 % — LOW (ref 13–44)
LYMPHOCYTES # BLD AUTO: 8 % — LOW (ref 13–44)
MAGNESIUM SERPL-MCNC: 1.7 MG/DL — SIGNIFICANT CHANGE UP (ref 1.6–2.6)
MAGNESIUM SERPL-MCNC: 1.8 MG/DL — SIGNIFICANT CHANGE UP (ref 1.6–2.6)
MAGNESIUM SERPL-MCNC: 1.8 MG/DL — SIGNIFICANT CHANGE UP (ref 1.6–2.6)
MAGNESIUM SERPL-MCNC: 1.9 MG/DL — SIGNIFICANT CHANGE UP (ref 1.6–2.6)
MAGNESIUM SERPL-MCNC: 2.2 MG/DL — SIGNIFICANT CHANGE UP (ref 1.6–2.6)
MAGNESIUM SERPL-MCNC: 2.4 MG/DL — SIGNIFICANT CHANGE UP (ref 1.6–2.6)
MAGNESIUM SERPL-MCNC: 2.4 MG/DL — SIGNIFICANT CHANGE UP (ref 1.6–2.6)
MAGNESIUM SERPL-MCNC: 2.5 MG/DL — SIGNIFICANT CHANGE UP (ref 1.6–2.6)
MANUAL SMEAR VERIFICATION: SIGNIFICANT CHANGE UP
MANUAL SMEAR VERIFICATION: SIGNIFICANT CHANGE UP
MCHC RBC-ENTMCNC: 23.9 PG — LOW (ref 27–34)
MCHC RBC-ENTMCNC: 24.1 PG — LOW (ref 27–34)
MCHC RBC-ENTMCNC: 24.2 PG — LOW (ref 27–34)
MCHC RBC-ENTMCNC: 24.2 PG — LOW (ref 27–34)
MCHC RBC-ENTMCNC: 24.3 PG — LOW (ref 27–34)
MCHC RBC-ENTMCNC: 24.3 PG — LOW (ref 27–34)
MCHC RBC-ENTMCNC: 24.5 PG — LOW (ref 27–34)
MCHC RBC-ENTMCNC: 24.5 PG — LOW (ref 27–34)
MCHC RBC-ENTMCNC: 24.7 PG — LOW (ref 27–34)
MCHC RBC-ENTMCNC: 24.8 PG — LOW (ref 27–34)
MCHC RBC-ENTMCNC: 30.2 GM/DL — LOW (ref 32–36)
MCHC RBC-ENTMCNC: 31.7 GM/DL — LOW (ref 32–36)
MCHC RBC-ENTMCNC: 31.9 GM/DL — LOW (ref 32–36)
MCHC RBC-ENTMCNC: 32 GM/DL — SIGNIFICANT CHANGE UP (ref 32–36)
MCHC RBC-ENTMCNC: 32.4 GM/DL — SIGNIFICANT CHANGE UP (ref 32–36)
MCHC RBC-ENTMCNC: 32.5 GM/DL — SIGNIFICANT CHANGE UP (ref 32–36)
MCHC RBC-ENTMCNC: 32.7 GM/DL — SIGNIFICANT CHANGE UP (ref 32–36)
MCHC RBC-ENTMCNC: 32.8 GM/DL — SIGNIFICANT CHANGE UP (ref 32–36)
MCHC RBC-ENTMCNC: 33.1 GM/DL — SIGNIFICANT CHANGE UP (ref 32–36)
MCHC RBC-ENTMCNC: 33.7 GM/DL — SIGNIFICANT CHANGE UP (ref 32–36)
MCV RBC AUTO: 73.3 FL — LOW (ref 80–100)
MCV RBC AUTO: 73.4 FL — LOW (ref 80–100)
MCV RBC AUTO: 73.4 FL — LOW (ref 80–100)
MCV RBC AUTO: 74 FL — LOW (ref 80–100)
MCV RBC AUTO: 74.2 FL — LOW (ref 80–100)
MCV RBC AUTO: 74.4 FL — LOW (ref 80–100)
MCV RBC AUTO: 74.7 FL — LOW (ref 80–100)
MCV RBC AUTO: 75 FL — LOW (ref 80–100)
MCV RBC AUTO: 75.3 FL — LOW (ref 80–100)
MCV RBC AUTO: 75.7 FL — LOW (ref 80–100)
MCV RBC AUTO: 76.6 FL — LOW (ref 80–100)
MCV RBC AUTO: 80 FL — SIGNIFICANT CHANGE UP (ref 80–100)
METHOD TYPE: SIGNIFICANT CHANGE UP
MONOCYTES # BLD AUTO: 0.88 K/UL — SIGNIFICANT CHANGE UP (ref 0–0.9)
MONOCYTES # BLD AUTO: 1.11 K/UL — HIGH (ref 0–0.9)
MONOCYTES # BLD AUTO: 1.24 K/UL — HIGH (ref 0–0.9)
MONOCYTES # BLD AUTO: 1.51 K/UL — HIGH (ref 0–0.9)
MONOCYTES # BLD AUTO: 1.52 K/UL — HIGH (ref 0–0.9)
MONOCYTES # BLD AUTO: 1.63 K/UL — HIGH (ref 0–0.9)
MONOCYTES NFR BLD AUTO: 11.4 % — SIGNIFICANT CHANGE UP (ref 2–14)
MONOCYTES NFR BLD AUTO: 7.7 % — SIGNIFICANT CHANGE UP (ref 2–14)
MONOCYTES NFR BLD AUTO: 8 % — SIGNIFICANT CHANGE UP (ref 2–14)
MONOCYTES NFR BLD AUTO: 8.7 % — SIGNIFICANT CHANGE UP (ref 2–14)
MONOCYTES NFR BLD AUTO: 9.1 % — SIGNIFICANT CHANGE UP (ref 2–14)
MONOCYTES NFR BLD AUTO: 9.3 % — SIGNIFICANT CHANGE UP (ref 2–14)
MRSA PCR RESULT.: SIGNIFICANT CHANGE UP
NEUTROPHILS # BLD AUTO: 10.34 K/UL — HIGH (ref 1.8–7.4)
NEUTROPHILS # BLD AUTO: 11.48 K/UL — HIGH (ref 1.8–7.4)
NEUTROPHILS # BLD AUTO: 12.3 K/UL — HIGH (ref 1.8–7.4)
NEUTROPHILS # BLD AUTO: 12.31 K/UL — HIGH (ref 1.8–7.4)
NEUTROPHILS # BLD AUTO: 16.18 K/UL — HIGH (ref 1.8–7.4)
NEUTROPHILS # BLD AUTO: 9.09 K/UL — HIGH (ref 1.8–7.4)
NEUTROPHILS NFR BLD AUTO: 76.3 % — SIGNIFICANT CHANGE UP (ref 43–77)
NEUTROPHILS NFR BLD AUTO: 77.2 % — HIGH (ref 43–77)
NEUTROPHILS NFR BLD AUTO: 83 % — HIGH (ref 43–77)
NEUTROPHILS NFR BLD AUTO: 84.4 % — HIGH (ref 43–77)
NEUTROPHILS NFR BLD AUTO: 85.1 % — HIGH (ref 43–77)
NEUTROPHILS NFR BLD AUTO: 86.8 % — HIGH (ref 43–77)
NITRITE UR-MCNC: NEGATIVE — SIGNIFICANT CHANGE UP
NITRITE UR-MCNC: POSITIVE
NRBC # BLD: 0 /100 WBCS — SIGNIFICANT CHANGE UP (ref 0–0)
NT-PROBNP SERPL-SCNC: 4773 PG/ML — HIGH (ref 0–450)
ORGANISM # SPEC MICROSCOPIC CNT: ABNORMAL
ORGANISM # SPEC MICROSCOPIC CNT: ABNORMAL
OVALOCYTES BLD QL SMEAR: SLIGHT — SIGNIFICANT CHANGE UP
PCO2 BLDA: 37 MMHG — HIGH (ref 32–35)
PCO2 BLDA: 44 MMHG — HIGH (ref 32–35)
PCO2 BLDA: 46 MMHG — HIGH (ref 32–35)
PCO2 BLDA: >125 MMHG — SIGNIFICANT CHANGE UP (ref 32–35)
PCO2 BLDV: 58 MMHG — HIGH (ref 39–42)
PCO2 BLDV: 86 MMHG — HIGH (ref 39–42)
PCO2 BLDV: 92 MMHG — HIGH (ref 39–42)
PH BLDA: 7.45 — SIGNIFICANT CHANGE UP (ref 7.35–7.45)
PH BLDA: 7.47 — HIGH (ref 7.35–7.45)
PH BLDA: 7.5 — HIGH (ref 7.35–7.45)
PH BLDA: <7 — SIGNIFICANT CHANGE UP (ref 7.35–7.45)
PH BLDV: 7.3 — LOW (ref 7.32–7.43)
PH BLDV: 7.31 — LOW (ref 7.32–7.43)
PH BLDV: 7.32 — SIGNIFICANT CHANGE UP (ref 7.32–7.43)
PH UR: 5.5 — SIGNIFICANT CHANGE UP (ref 5–8)
PH UR: 5.5 — SIGNIFICANT CHANGE UP (ref 5–8)
PHOSPHATE SERPL-MCNC: 1.4 MG/DL — LOW (ref 2.5–4.5)
PHOSPHATE SERPL-MCNC: 2.5 MG/DL — SIGNIFICANT CHANGE UP (ref 2.5–4.5)
PHOSPHATE SERPL-MCNC: 2.9 MG/DL — SIGNIFICANT CHANGE UP (ref 2.5–4.5)
PHOSPHATE SERPL-MCNC: 3 MG/DL — SIGNIFICANT CHANGE UP (ref 2.5–4.5)
PHOSPHATE SERPL-MCNC: 3 MG/DL — SIGNIFICANT CHANGE UP (ref 2.5–4.5)
PHOSPHATE SERPL-MCNC: 3.4 MG/DL — SIGNIFICANT CHANGE UP (ref 2.5–4.5)
PLAT MORPH BLD: NORMAL — SIGNIFICANT CHANGE UP
PLAT MORPH BLD: NORMAL — SIGNIFICANT CHANGE UP
PLATELET # BLD AUTO: 206 K/UL — SIGNIFICANT CHANGE UP (ref 150–400)
PLATELET # BLD AUTO: 209 K/UL — SIGNIFICANT CHANGE UP (ref 150–400)
PLATELET # BLD AUTO: 228 K/UL — SIGNIFICANT CHANGE UP (ref 150–400)
PLATELET # BLD AUTO: 257 K/UL — SIGNIFICANT CHANGE UP (ref 150–400)
PLATELET # BLD AUTO: 317 K/UL — SIGNIFICANT CHANGE UP (ref 150–400)
PLATELET # BLD AUTO: 331 K/UL — SIGNIFICANT CHANGE UP (ref 150–400)
PLATELET # BLD AUTO: 359 K/UL — SIGNIFICANT CHANGE UP (ref 150–400)
PLATELET # BLD AUTO: 359 K/UL — SIGNIFICANT CHANGE UP (ref 150–400)
PLATELET # BLD AUTO: 365 K/UL — SIGNIFICANT CHANGE UP (ref 150–400)
PLATELET # BLD AUTO: 417 K/UL — HIGH (ref 150–400)
PLATELET # BLD AUTO: 479 K/UL — HIGH (ref 150–400)
PLATELET # BLD AUTO: 663 K/UL — HIGH (ref 150–400)
PLATELET COUNT - ESTIMATE: NORMAL — SIGNIFICANT CHANGE UP
PLATELET COUNT - ESTIMATE: NORMAL — SIGNIFICANT CHANGE UP
PO2 BLDA: 136 MMHG — HIGH (ref 83–108)
PO2 BLDA: 149 MMHG — HIGH (ref 83–108)
PO2 BLDA: 153 MMHG — HIGH (ref 83–108)
PO2 BLDA: 85 MMHG — SIGNIFICANT CHANGE UP (ref 83–108)
PO2 BLDV: 36 MMHG — SIGNIFICANT CHANGE UP
PO2 BLDV: 38 MMHG — SIGNIFICANT CHANGE UP
PO2 BLDV: 49 MMHG — SIGNIFICANT CHANGE UP
POIKILOCYTOSIS BLD QL AUTO: SLIGHT — SIGNIFICANT CHANGE UP
POTASSIUM BLDV-SCNC: 4.5 MMOL/L — SIGNIFICANT CHANGE UP (ref 3.5–5.1)
POTASSIUM BLDV-SCNC: 4.9 MMOL/L — SIGNIFICANT CHANGE UP (ref 3.5–5.1)
POTASSIUM SERPL-MCNC: 3.3 MMOL/L — LOW (ref 3.5–5.3)
POTASSIUM SERPL-MCNC: 3.4 MMOL/L — LOW (ref 3.5–5.3)
POTASSIUM SERPL-MCNC: 3.5 MMOL/L — SIGNIFICANT CHANGE UP (ref 3.5–5.3)
POTASSIUM SERPL-MCNC: 3.6 MMOL/L — SIGNIFICANT CHANGE UP (ref 3.5–5.3)
POTASSIUM SERPL-MCNC: 3.9 MMOL/L — SIGNIFICANT CHANGE UP (ref 3.5–5.3)
POTASSIUM SERPL-MCNC: 4.8 MMOL/L — SIGNIFICANT CHANGE UP (ref 3.5–5.3)
POTASSIUM SERPL-MCNC: 5.1 MMOL/L — SIGNIFICANT CHANGE UP (ref 3.5–5.3)
POTASSIUM SERPL-MCNC: 5.3 MMOL/L — SIGNIFICANT CHANGE UP (ref 3.5–5.3)
POTASSIUM SERPL-SCNC: 3.3 MMOL/L — LOW (ref 3.5–5.3)
POTASSIUM SERPL-SCNC: 3.4 MMOL/L — LOW (ref 3.5–5.3)
POTASSIUM SERPL-SCNC: 3.5 MMOL/L — SIGNIFICANT CHANGE UP (ref 3.5–5.3)
POTASSIUM SERPL-SCNC: 3.6 MMOL/L — SIGNIFICANT CHANGE UP (ref 3.5–5.3)
POTASSIUM SERPL-SCNC: 3.9 MMOL/L — SIGNIFICANT CHANGE UP (ref 3.5–5.3)
POTASSIUM SERPL-SCNC: 4.8 MMOL/L — SIGNIFICANT CHANGE UP (ref 3.5–5.3)
POTASSIUM SERPL-SCNC: 5.1 MMOL/L — SIGNIFICANT CHANGE UP (ref 3.5–5.3)
POTASSIUM SERPL-SCNC: 5.3 MMOL/L — SIGNIFICANT CHANGE UP (ref 3.5–5.3)
PROT SERPL-MCNC: 6 G/DL — SIGNIFICANT CHANGE UP (ref 6–8.3)
PROT SERPL-MCNC: 6.1 G/DL — SIGNIFICANT CHANGE UP (ref 6–8.3)
PROT SERPL-MCNC: 6.7 G/DL — SIGNIFICANT CHANGE UP (ref 6–8.3)
PROT SERPL-MCNC: 7.8 G/DL — SIGNIFICANT CHANGE UP (ref 6–8.3)
PROT SERPL-MCNC: 7.9 G/DL — SIGNIFICANT CHANGE UP (ref 6–8.3)
PROT UR-MCNC: 30 MG/DL
PROT UR-MCNC: 300 MG/DL
PROTHROM AB SERPL-ACNC: 19 SEC — HIGH (ref 9.5–13)
RAPID RVP RESULT: SIGNIFICANT CHANGE UP
RBC # BLD: 4.21 M/UL — SIGNIFICANT CHANGE UP (ref 3.8–5.2)
RBC # BLD: 4.48 M/UL — SIGNIFICANT CHANGE UP (ref 3.8–5.2)
RBC # BLD: 4.53 M/UL — SIGNIFICANT CHANGE UP (ref 3.8–5.2)
RBC # BLD: 4.57 M/UL — SIGNIFICANT CHANGE UP (ref 3.8–5.2)
RBC # BLD: 4.59 M/UL — SIGNIFICANT CHANGE UP (ref 3.8–5.2)
RBC # BLD: 4.61 M/UL — SIGNIFICANT CHANGE UP (ref 3.8–5.2)
RBC # BLD: 4.69 M/UL — SIGNIFICANT CHANGE UP (ref 3.8–5.2)
RBC # BLD: 4.69 M/UL — SIGNIFICANT CHANGE UP (ref 3.8–5.2)
RBC # BLD: 4.72 M/UL — SIGNIFICANT CHANGE UP (ref 3.8–5.2)
RBC # BLD: 4.9 M/UL — SIGNIFICANT CHANGE UP (ref 3.8–5.2)
RBC # BLD: 4.99 M/UL — SIGNIFICANT CHANGE UP (ref 3.8–5.2)
RBC # BLD: 5.68 M/UL — HIGH (ref 3.8–5.2)
RBC # FLD: 16.2 % — HIGH (ref 10.3–14.5)
RBC # FLD: 16.4 % — HIGH (ref 10.3–14.5)
RBC # FLD: 16.5 % — HIGH (ref 10.3–14.5)
RBC # FLD: 16.8 % — HIGH (ref 10.3–14.5)
RBC # FLD: 17.1 % — HIGH (ref 10.3–14.5)
RBC # FLD: 17.1 % — HIGH (ref 10.3–14.5)
RBC # FLD: 17.5 % — HIGH (ref 10.3–14.5)
RBC # FLD: 17.6 % — HIGH (ref 10.3–14.5)
RBC # FLD: 17.6 % — HIGH (ref 10.3–14.5)
RBC # FLD: 18 % — HIGH (ref 10.3–14.5)
RBC # FLD: 18.3 % — HIGH (ref 10.3–14.5)
RBC # FLD: 18.3 % — HIGH (ref 10.3–14.5)
RBC BLD AUTO: ABNORMAL
RBC BLD AUTO: NORMAL — SIGNIFICANT CHANGE UP
RBC CASTS # UR COMP ASSIST: 2 /HPF — SIGNIFICANT CHANGE UP (ref 0–4)
RBC CASTS # UR COMP ASSIST: 2 /HPF — SIGNIFICANT CHANGE UP (ref 0–4)
S AUREUS DNA NOSE QL NAA+PROBE: DETECTED
SAO2 % BLDA: 100 % — SIGNIFICANT CHANGE UP
SAO2 % BLDA: 100 % — SIGNIFICANT CHANGE UP
SAO2 % BLDA: 96 % — SIGNIFICANT CHANGE UP
SAO2 % BLDA: 99 % — SIGNIFICANT CHANGE UP
SAO2 % BLDV: 61.9 % — SIGNIFICANT CHANGE UP
SAO2 % BLDV: 62.6 % — SIGNIFICANT CHANGE UP
SAO2 % BLDV: 83.5 % — SIGNIFICANT CHANGE UP
SARS-COV-2 RNA SPEC QL NAA+PROBE: SIGNIFICANT CHANGE UP
SODIUM SERPL-SCNC: 138 MMOL/L — SIGNIFICANT CHANGE UP (ref 135–145)
SODIUM SERPL-SCNC: 142 MMOL/L — SIGNIFICANT CHANGE UP (ref 135–145)
SODIUM SERPL-SCNC: 143 MMOL/L — SIGNIFICANT CHANGE UP (ref 135–145)
SODIUM SERPL-SCNC: 143 MMOL/L — SIGNIFICANT CHANGE UP (ref 135–145)
SODIUM SERPL-SCNC: 144 MMOL/L — SIGNIFICANT CHANGE UP (ref 135–145)
SODIUM SERPL-SCNC: 144 MMOL/L — SIGNIFICANT CHANGE UP (ref 135–145)
SODIUM SERPL-SCNC: 145 MMOL/L — SIGNIFICANT CHANGE UP (ref 135–145)
SODIUM SERPL-SCNC: 146 MMOL/L — HIGH (ref 135–145)
SODIUM SERPL-SCNC: 147 MMOL/L — HIGH (ref 135–145)
SP GR SPEC: 1.02 — SIGNIFICANT CHANGE UP (ref 1–1.03)
SP GR SPEC: 1.02 — SIGNIFICANT CHANGE UP (ref 1–1.03)
SPECIMEN SOURCE: SIGNIFICANT CHANGE UP
STOMATOCYTES BLD QL SMEAR: SLIGHT — SIGNIFICANT CHANGE UP
TARGETS BLD QL SMEAR: SIGNIFICANT CHANGE UP
TROPONIN I, HIGH SENSITIVITY RESULT: 22.6 NG/L — SIGNIFICANT CHANGE UP
TROPONIN I, HIGH SENSITIVITY RESULT: 96.9 NG/L — HIGH
TROPONIN I, HIGH SENSITIVITY RESULT: 98.5 NG/L — HIGH
UROBILINOGEN FLD QL: 1 MG/DL — SIGNIFICANT CHANGE UP (ref 0.2–1)
UROBILINOGEN FLD QL: >=8 MG/DL (ref 0.2–1)
WBC # BLD: 10.95 K/UL — HIGH (ref 3.8–10.5)
WBC # BLD: 13.38 K/UL — HIGH (ref 3.8–10.5)
WBC # BLD: 13.59 K/UL — HIGH (ref 3.8–10.5)
WBC # BLD: 14.45 K/UL — HIGH (ref 3.8–10.5)
WBC # BLD: 14.84 K/UL — HIGH (ref 3.8–10.5)
WBC # BLD: 14.91 K/UL — HIGH (ref 3.8–10.5)
WBC # BLD: 16.15 K/UL — HIGH (ref 3.8–10.5)
WBC # BLD: 16.29 K/UL — HIGH (ref 3.8–10.5)
WBC # BLD: 16.45 K/UL — HIGH (ref 3.8–10.5)
WBC # BLD: 16.55 K/UL — HIGH (ref 3.8–10.5)
WBC # BLD: 18.63 K/UL — HIGH (ref 3.8–10.5)
WBC # BLD: 20.52 K/UL — HIGH (ref 3.8–10.5)
WBC # FLD AUTO: 10.95 K/UL — HIGH (ref 3.8–10.5)
WBC # FLD AUTO: 13.38 K/UL — HIGH (ref 3.8–10.5)
WBC # FLD AUTO: 13.59 K/UL — HIGH (ref 3.8–10.5)
WBC # FLD AUTO: 14.45 K/UL — HIGH (ref 3.8–10.5)
WBC # FLD AUTO: 14.84 K/UL — HIGH (ref 3.8–10.5)
WBC # FLD AUTO: 14.91 K/UL — HIGH (ref 3.8–10.5)
WBC # FLD AUTO: 16.15 K/UL — HIGH (ref 3.8–10.5)
WBC # FLD AUTO: 16.29 K/UL — HIGH (ref 3.8–10.5)
WBC # FLD AUTO: 16.45 K/UL — HIGH (ref 3.8–10.5)
WBC # FLD AUTO: 16.55 K/UL — HIGH (ref 3.8–10.5)
WBC # FLD AUTO: 18.63 K/UL — HIGH (ref 3.8–10.5)
WBC # FLD AUTO: 20.52 K/UL — HIGH (ref 3.8–10.5)
WBC UR QL: 6 /HPF — HIGH (ref 0–5)
WBC UR QL: >50 /HPF — HIGH (ref 0–5)

## 2024-01-01 PROCEDURE — 80053 COMPREHEN METABOLIC PANEL: CPT

## 2024-01-01 PROCEDURE — 71045 X-RAY EXAM CHEST 1 VIEW: CPT | Mod: 26,77,76

## 2024-01-01 PROCEDURE — 82803 BLOOD GASES ANY COMBINATION: CPT

## 2024-01-01 PROCEDURE — 85025 COMPLETE CBC W/AUTO DIFF WBC: CPT

## 2024-01-01 PROCEDURE — 82330 ASSAY OF CALCIUM: CPT

## 2024-01-01 PROCEDURE — 70450 CT HEAD/BRAIN W/O DYE: CPT | Mod: 26

## 2024-01-01 PROCEDURE — 83605 ASSAY OF LACTIC ACID: CPT

## 2024-01-01 PROCEDURE — 83735 ASSAY OF MAGNESIUM: CPT

## 2024-01-01 PROCEDURE — 97110 THERAPEUTIC EXERCISES: CPT

## 2024-01-01 PROCEDURE — 99233 SBSQ HOSP IP/OBS HIGH 50: CPT | Mod: GC

## 2024-01-01 PROCEDURE — 36415 COLL VENOUS BLD VENIPUNCTURE: CPT

## 2024-01-01 PROCEDURE — 87186 SC STD MICRODIL/AGAR DIL: CPT

## 2024-01-01 PROCEDURE — 97530 THERAPEUTIC ACTIVITIES: CPT

## 2024-01-01 PROCEDURE — 96374 THER/PROPH/DIAG INJ IV PUSH: CPT

## 2024-01-01 PROCEDURE — 94003 VENT MGMT INPAT SUBQ DAY: CPT

## 2024-01-01 PROCEDURE — 70450 CT HEAD/BRAIN W/O DYE: CPT | Mod: MC

## 2024-01-01 PROCEDURE — 83880 ASSAY OF NATRIURETIC PEPTIDE: CPT

## 2024-01-01 PROCEDURE — 99285 EMERGENCY DEPT VISIT HI MDM: CPT

## 2024-01-01 PROCEDURE — 82550 ASSAY OF CK (CPK): CPT

## 2024-01-01 PROCEDURE — 71045 X-RAY EXAM CHEST 1 VIEW: CPT | Mod: 26

## 2024-01-01 PROCEDURE — 87635 SARS-COV-2 COVID-19 AMP PRB: CPT

## 2024-01-01 PROCEDURE — 84132 ASSAY OF SERUM POTASSIUM: CPT

## 2024-01-01 PROCEDURE — 92610 EVALUATE SWALLOWING FUNCTION: CPT

## 2024-01-01 PROCEDURE — 84100 ASSAY OF PHOSPHORUS: CPT

## 2024-01-01 PROCEDURE — 84484 ASSAY OF TROPONIN QUANT: CPT

## 2024-01-01 PROCEDURE — 80048 BASIC METABOLIC PNL TOTAL CA: CPT

## 2024-01-01 PROCEDURE — 93005 ELECTROCARDIOGRAM TRACING: CPT

## 2024-01-01 PROCEDURE — 0225U NFCT DS DNA&RNA 21 SARSCOV2: CPT

## 2024-01-01 PROCEDURE — 82962 GLUCOSE BLOOD TEST: CPT

## 2024-01-01 PROCEDURE — 99291 CRITICAL CARE FIRST HOUR: CPT | Mod: 25

## 2024-01-01 PROCEDURE — 84295 ASSAY OF SERUM SODIUM: CPT

## 2024-01-01 PROCEDURE — 85027 COMPLETE CBC AUTOMATED: CPT

## 2024-01-01 PROCEDURE — 97162 PT EVAL MOD COMPLEX 30 MIN: CPT

## 2024-01-01 PROCEDURE — 87086 URINE CULTURE/COLONY COUNT: CPT

## 2024-01-01 PROCEDURE — 94640 AIRWAY INHALATION TREATMENT: CPT

## 2024-01-01 PROCEDURE — 85610 PROTHROMBIN TIME: CPT

## 2024-01-01 PROCEDURE — 96375 TX/PRO/DX INJ NEW DRUG ADDON: CPT

## 2024-01-01 PROCEDURE — 85730 THROMBOPLASTIN TIME PARTIAL: CPT

## 2024-01-01 PROCEDURE — 71250 CT THORAX DX C-: CPT | Mod: 26,MC

## 2024-01-01 PROCEDURE — 81001 URINALYSIS AUTO W/SCOPE: CPT

## 2024-01-01 PROCEDURE — 71250 CT THORAX DX C-: CPT | Mod: MC

## 2024-01-01 PROCEDURE — 99291 CRITICAL CARE FIRST HOUR: CPT

## 2024-01-01 PROCEDURE — 83690 ASSAY OF LIPASE: CPT

## 2024-01-01 PROCEDURE — 87641 MR-STAPH DNA AMP PROBE: CPT

## 2024-01-01 PROCEDURE — 87040 BLOOD CULTURE FOR BACTERIA: CPT

## 2024-01-01 PROCEDURE — 87077 CULTURE AEROBIC IDENTIFY: CPT

## 2024-01-01 PROCEDURE — 87070 CULTURE OTHR SPECIMN AEROBIC: CPT

## 2024-01-01 PROCEDURE — 71045 X-RAY EXAM CHEST 1 VIEW: CPT

## 2024-01-01 PROCEDURE — 87640 STAPH A DNA AMP PROBE: CPT

## 2024-01-01 PROCEDURE — 87637 SARSCOV2&INF A&B&RSV AMP PRB: CPT

## 2024-01-01 PROCEDURE — 93306 TTE W/DOPPLER COMPLETE: CPT

## 2024-01-01 PROCEDURE — 70450 CT HEAD/BRAIN W/O DYE: CPT | Mod: 26,MC

## 2024-01-01 RX ORDER — APIXABAN 2.5 MG/1
5 TABLET, FILM COATED ORAL
Refills: 0 | Status: DISCONTINUED | OUTPATIENT
Start: 2024-01-01 | End: 2024-01-01

## 2024-01-01 RX ORDER — FUROSEMIDE 40 MG
20 TABLET ORAL ONCE
Refills: 0 | Status: COMPLETED | OUTPATIENT
Start: 2024-01-01 | End: 2024-01-01

## 2024-01-01 RX ORDER — AMIODARONE HYDROCHLORIDE 400 MG/1
200 TABLET ORAL DAILY
Refills: 0 | Status: DISCONTINUED | OUTPATIENT
Start: 2024-01-01 | End: 2024-01-01

## 2024-01-01 RX ORDER — APIXABAN 2.5 MG/1
1 TABLET, FILM COATED ORAL
Qty: 0 | Refills: 0 | DISCHARGE
Start: 2024-01-01

## 2024-01-01 RX ORDER — AMIODARONE HYDROCHLORIDE 400 MG/1
150 TABLET ORAL ONCE
Refills: 0 | Status: COMPLETED | OUTPATIENT
Start: 2024-01-01 | End: 2024-01-01

## 2024-01-01 RX ORDER — LATANOPROST 0.05 MG/ML
1 SOLUTION/ DROPS OPHTHALMIC; TOPICAL AT BEDTIME
Refills: 0 | Status: DISCONTINUED | OUTPATIENT
Start: 2024-01-01 | End: 2024-01-01

## 2024-01-01 RX ORDER — AMIODARONE HYDROCHLORIDE 400 MG/1
0.5 TABLET ORAL
Qty: 450 | Refills: 0 | Status: DISCONTINUED | OUTPATIENT
Start: 2024-01-01 | End: 2024-01-01

## 2024-01-01 RX ORDER — ACETAMINOPHEN 500 MG
2 TABLET ORAL
Qty: 0 | Refills: 0 | DISCHARGE
Start: 2024-01-01

## 2024-01-01 RX ORDER — IPRATROPIUM/ALBUTEROL SULFATE 18-103MCG
3 AEROSOL WITH ADAPTER (GRAM) INHALATION EVERY 6 HOURS
Refills: 0 | Status: DISCONTINUED | OUTPATIENT
Start: 2024-01-01 | End: 2024-01-01

## 2024-01-01 RX ORDER — ALBUTEROL 90 UG/1
2.5 AEROSOL, METERED ORAL EVERY 6 HOURS
Refills: 0 | Status: DISCONTINUED | OUTPATIENT
Start: 2024-01-01 | End: 2024-01-01

## 2024-01-01 RX ORDER — TIOTROPIUM BROMIDE 18 UG/1
2 CAPSULE ORAL; RESPIRATORY (INHALATION) DAILY
Refills: 0 | Status: DISCONTINUED | OUTPATIENT
Start: 2024-01-01 | End: 2024-01-01

## 2024-01-01 RX ORDER — SODIUM ZIRCONIUM CYCLOSILICATE 10 G/10G
10 POWDER, FOR SUSPENSION ORAL ONCE
Refills: 0 | Status: DISCONTINUED | OUTPATIENT
Start: 2024-01-01 | End: 2024-01-01

## 2024-01-01 RX ORDER — AMIODARONE HYDROCHLORIDE 400 MG/1
1 TABLET ORAL
Qty: 450 | Refills: 0 | Status: COMPLETED | OUTPATIENT
Start: 2024-01-01 | End: 2024-01-01

## 2024-01-01 RX ORDER — HALOPERIDOL DECANOATE 100 MG/ML
5 INJECTION INTRAMUSCULAR ONCE
Refills: 0 | Status: DISCONTINUED | OUTPATIENT
Start: 2024-01-01 | End: 2024-01-01

## 2024-01-01 RX ORDER — POTASSIUM CHLORIDE 20 MEQ
40 PACKET (EA) ORAL ONCE
Refills: 0 | Status: COMPLETED | OUTPATIENT
Start: 2024-01-01 | End: 2024-01-01

## 2024-01-01 RX ORDER — METOPROLOL TARTRATE 50 MG
25 TABLET ORAL
Refills: 0 | Status: DISCONTINUED | OUTPATIENT
Start: 2024-01-01 | End: 2024-01-01

## 2024-01-01 RX ORDER — LATANOPROST 0.05 MG/ML
1 SOLUTION/ DROPS OPHTHALMIC; TOPICAL
Qty: 0 | Refills: 0 | DISCHARGE

## 2024-01-01 RX ORDER — NOREPINEPHRINE BITARTRATE/D5W 8 MG/250ML
0.1 PLASTIC BAG, INJECTION (ML) INTRAVENOUS
Qty: 8 | Refills: 0 | Status: DISCONTINUED | OUTPATIENT
Start: 2024-01-01 | End: 2024-01-01

## 2024-01-01 RX ORDER — LEVOFLOXACIN 5 MG/ML
1 INJECTION, SOLUTION INTRAVENOUS
Refills: 0 | DISCHARGE

## 2024-01-01 RX ORDER — BUDESONIDE AND FORMOTEROL FUMARATE DIHYDRATE 160; 4.5 UG/1; UG/1
2 AEROSOL RESPIRATORY (INHALATION)
Refills: 0 | Status: DISCONTINUED | OUTPATIENT
Start: 2024-01-01 | End: 2024-01-01

## 2024-01-01 RX ORDER — CHLORHEXIDINE GLUCONATE 213 G/1000ML
1 SOLUTION TOPICAL DAILY
Refills: 0 | Status: DISCONTINUED | OUTPATIENT
Start: 2024-01-01 | End: 2024-01-01

## 2024-01-01 RX ORDER — ETOMIDATE 2 MG/ML
20 INJECTION INTRAVENOUS ONCE
Refills: 0 | Status: COMPLETED | OUTPATIENT
Start: 2024-01-01 | End: 2024-01-01

## 2024-01-01 RX ORDER — DOCUSATE SODIUM 100 MG
3 CAPSULE ORAL
Refills: 0 | DISCHARGE

## 2024-01-01 RX ORDER — ACETAMINOPHEN 500 MG
1000 TABLET ORAL ONCE
Refills: 0 | Status: COMPLETED | OUTPATIENT
Start: 2024-01-01 | End: 2024-01-01

## 2024-01-01 RX ORDER — PIPERACILLIN AND TAZOBACTAM 4; .5 G/20ML; G/20ML
3.38 INJECTION, POWDER, LYOPHILIZED, FOR SOLUTION INTRAVENOUS ONCE
Refills: 0 | Status: DISCONTINUED | OUTPATIENT
Start: 2024-01-01 | End: 2024-01-01

## 2024-01-01 RX ORDER — PIPERACILLIN AND TAZOBACTAM 4; .5 G/20ML; G/20ML
3.38 INJECTION, POWDER, LYOPHILIZED, FOR SOLUTION INTRAVENOUS EVERY 8 HOURS
Refills: 0 | Status: DISCONTINUED | OUTPATIENT
Start: 2024-01-01 | End: 2024-01-01

## 2024-01-01 RX ORDER — FUROSEMIDE 40 MG
20 TABLET ORAL DAILY
Refills: 0 | Status: DISCONTINUED | OUTPATIENT
Start: 2024-01-01 | End: 2024-01-01

## 2024-01-01 RX ORDER — SODIUM CHLORIDE 9 MG/ML
1000 INJECTION INTRAMUSCULAR; INTRAVENOUS; SUBCUTANEOUS ONCE
Refills: 0 | Status: COMPLETED | OUTPATIENT
Start: 2024-01-01 | End: 2024-01-01

## 2024-01-01 RX ORDER — PROPOFOL 10 MG/ML
20 INJECTION, EMULSION INTRAVENOUS
Qty: 1000 | Refills: 0 | Status: DISCONTINUED | OUTPATIENT
Start: 2024-01-01 | End: 2024-01-01

## 2024-01-01 RX ORDER — POTASSIUM CHLORIDE 20 MEQ
10 PACKET (EA) ORAL
Refills: 0 | Status: COMPLETED | OUTPATIENT
Start: 2024-01-01 | End: 2024-01-01

## 2024-01-01 RX ORDER — ACETAMINOPHEN 500 MG
2 TABLET ORAL
Refills: 0 | DISCHARGE

## 2024-01-01 RX ORDER — ENOXAPARIN SODIUM 100 MG/ML
70 INJECTION SUBCUTANEOUS EVERY 12 HOURS
Refills: 0 | Status: DISCONTINUED | OUTPATIENT
Start: 2024-01-01 | End: 2024-01-01

## 2024-01-01 RX ORDER — POLYETHYLENE GLYCOL 3350 17 G/17G
17 POWDER, FOR SOLUTION ORAL
Refills: 0 | DISCHARGE

## 2024-01-01 RX ORDER — PANTOPRAZOLE SODIUM 20 MG/1
40 TABLET, DELAYED RELEASE ORAL DAILY
Refills: 0 | Status: DISCONTINUED | OUTPATIENT
Start: 2024-01-01 | End: 2024-01-01

## 2024-01-01 RX ORDER — IPRATROPIUM/ALBUTEROL SULFATE 18-103MCG
3 AEROSOL WITH ADAPTER (GRAM) INHALATION
Refills: 0 | DISCHARGE

## 2024-01-01 RX ORDER — SODIUM CHLORIDE 9 MG/ML
500 INJECTION INTRAMUSCULAR; INTRAVENOUS; SUBCUTANEOUS ONCE
Refills: 0 | Status: COMPLETED | OUTPATIENT
Start: 2024-01-01 | End: 2024-01-01

## 2024-01-01 RX ORDER — DEXTROSE 50 % IN WATER 50 %
50 SYRINGE (ML) INTRAVENOUS ONCE
Refills: 0 | Status: COMPLETED | OUTPATIENT
Start: 2024-01-01 | End: 2024-01-01

## 2024-01-01 RX ORDER — FENTANYL CITRATE 50 UG/ML
25 INJECTION INTRAVENOUS ONCE
Refills: 0 | Status: DISCONTINUED | OUTPATIENT
Start: 2024-01-01 | End: 2024-01-01

## 2024-01-01 RX ORDER — LIDOCAINE 4 G/100G
1 CREAM TOPICAL
Refills: 0 | DISCHARGE

## 2024-01-01 RX ORDER — ALBUTEROL 90 UG/1
2 AEROSOL, METERED ORAL EVERY 6 HOURS
Refills: 0 | Status: DISCONTINUED | OUTPATIENT
Start: 2024-01-01 | End: 2024-01-01

## 2024-01-01 RX ORDER — AMIODARONE HYDROCHLORIDE 400 MG/1
100 TABLET ORAL DAILY
Refills: 0 | Status: DISCONTINUED | OUTPATIENT
Start: 2024-01-01 | End: 2024-01-01

## 2024-01-01 RX ORDER — HALOPERIDOL DECANOATE 100 MG/ML
5 INJECTION INTRAMUSCULAR ONCE
Refills: 0 | Status: COMPLETED | OUTPATIENT
Start: 2024-01-01 | End: 2024-01-01

## 2024-01-01 RX ORDER — METOPROLOL TARTRATE 50 MG
1 TABLET ORAL
Qty: 0 | Refills: 0 | DISCHARGE
Start: 2024-01-01

## 2024-01-01 RX ORDER — HYDROMORPHONE HYDROCHLORIDE 2 MG/ML
0.5 INJECTION INTRAMUSCULAR; INTRAVENOUS; SUBCUTANEOUS
Refills: 0 | Status: DISCONTINUED | OUTPATIENT
Start: 2024-01-01 | End: 2024-01-01

## 2024-01-01 RX ORDER — ROCURONIUM BROMIDE 10 MG/ML
100 VIAL (ML) INTRAVENOUS ONCE
Refills: 0 | Status: COMPLETED | OUTPATIENT
Start: 2024-01-01 | End: 2024-01-01

## 2024-01-01 RX ORDER — ACETAMINOPHEN 500 MG
975 TABLET ORAL ONCE
Refills: 0 | Status: COMPLETED | OUTPATIENT
Start: 2024-01-01 | End: 2024-01-01

## 2024-01-01 RX ORDER — CEFTRIAXONE 500 MG/1
1000 INJECTION, POWDER, FOR SOLUTION INTRAMUSCULAR; INTRAVENOUS EVERY 24 HOURS
Refills: 0 | Status: DISCONTINUED | OUTPATIENT
Start: 2024-01-01 | End: 2024-01-01

## 2024-01-01 RX ORDER — ACETAMINOPHEN 500 MG
650 TABLET ORAL EVERY 6 HOURS
Refills: 0 | Status: DISCONTINUED | OUTPATIENT
Start: 2024-01-01 | End: 2024-01-01

## 2024-01-01 RX ORDER — SENNA PLUS 8.6 MG/1
2 TABLET ORAL AT BEDTIME
Refills: 0 | Status: DISCONTINUED | OUTPATIENT
Start: 2024-01-01 | End: 2024-01-01

## 2024-01-01 RX ORDER — CEFAZOLIN SODIUM 1 G
2000 VIAL (EA) INJECTION EVERY 8 HOURS
Refills: 0 | Status: DISCONTINUED | OUTPATIENT
Start: 2024-01-01 | End: 2024-01-01

## 2024-01-01 RX ORDER — DICLOFENAC SODIUM 30 MG/G
2 GEL TOPICAL
Refills: 0 | DISCHARGE

## 2024-01-01 RX ORDER — FUROSEMIDE 40 MG
1 TABLET ORAL
Refills: 0 | DISCHARGE

## 2024-01-01 RX ORDER — SODIUM CHLORIDE 9 MG/ML
10 INJECTION INTRAMUSCULAR; INTRAVENOUS; SUBCUTANEOUS
Refills: 0 | Status: DISCONTINUED | OUTPATIENT
Start: 2024-01-01 | End: 2024-01-01

## 2024-01-01 RX ORDER — PANTOPRAZOLE SODIUM 20 MG/1
40 TABLET, DELAYED RELEASE ORAL
Refills: 0 | Status: DISCONTINUED | OUTPATIENT
Start: 2024-01-01 | End: 2024-01-01

## 2024-01-01 RX ORDER — MIDAZOLAM HYDROCHLORIDE 1 MG/ML
2 INJECTION, SOLUTION INTRAMUSCULAR; INTRAVENOUS ONCE
Refills: 0 | Status: DISCONTINUED | OUTPATIENT
Start: 2024-01-01 | End: 2024-01-01

## 2024-01-01 RX ORDER — MIDAZOLAM HYDROCHLORIDE 1 MG/ML
4 INJECTION, SOLUTION INTRAMUSCULAR; INTRAVENOUS ONCE
Refills: 0 | Status: DISCONTINUED | OUTPATIENT
Start: 2024-01-01 | End: 2024-01-01

## 2024-01-01 RX ORDER — MAGNESIUM SULFATE 500 MG/ML
2 VIAL (ML) INJECTION ONCE
Refills: 0 | Status: COMPLETED | OUTPATIENT
Start: 2024-01-01 | End: 2024-01-01

## 2024-01-01 RX ORDER — PIPERACILLIN AND TAZOBACTAM 4; .5 G/20ML; G/20ML
3.38 INJECTION, POWDER, LYOPHILIZED, FOR SOLUTION INTRAVENOUS ONCE
Refills: 0 | Status: COMPLETED | OUTPATIENT
Start: 2024-01-01 | End: 2024-01-01

## 2024-01-01 RX ORDER — CHLORHEXIDINE GLUCONATE 213 G/1000ML
15 SOLUTION TOPICAL EVERY 12 HOURS
Refills: 0 | Status: DISCONTINUED | OUTPATIENT
Start: 2024-01-01 | End: 2024-01-01

## 2024-01-01 RX ORDER — ATORVASTATIN CALCIUM 80 MG/1
1 TABLET, FILM COATED ORAL
Refills: 0 | DISCHARGE

## 2024-01-01 RX ORDER — VANCOMYCIN HCL 1 G
1000 VIAL (EA) INTRAVENOUS ONCE
Refills: 0 | Status: COMPLETED | OUTPATIENT
Start: 2024-01-01 | End: 2024-01-01

## 2024-01-01 RX ORDER — METOPROLOL TARTRATE 50 MG
1 TABLET ORAL
Refills: 0 | DISCHARGE

## 2024-01-01 RX ORDER — CEFAZOLIN SODIUM 1 G
2000 VIAL (EA) INJECTION ONCE
Refills: 0 | Status: COMPLETED | OUTPATIENT
Start: 2024-01-01 | End: 2024-01-01

## 2024-01-01 RX ORDER — MUPIROCIN 20 MG/G
1 OINTMENT TOPICAL
Refills: 0 | Status: COMPLETED | OUTPATIENT
Start: 2024-01-01 | End: 2024-01-01

## 2024-01-01 RX ORDER — POTASSIUM CHLORIDE 20 MEQ
40 PACKET (EA) ORAL ONCE
Refills: 0 | Status: DISCONTINUED | OUTPATIENT
Start: 2024-01-01 | End: 2024-01-01

## 2024-01-01 RX ORDER — ROBINUL 0.2 MG/ML
0.4 INJECTION INTRAMUSCULAR; INTRAVENOUS
Refills: 0 | Status: DISCONTINUED | OUTPATIENT
Start: 2024-01-01 | End: 2024-01-01

## 2024-01-01 RX ORDER — METOPROLOL TARTRATE 50 MG
5 TABLET ORAL ONCE
Refills: 0 | Status: COMPLETED | OUTPATIENT
Start: 2024-01-01 | End: 2024-01-01

## 2024-01-01 RX ORDER — VANCOMYCIN HCL 1 G
VIAL (EA) INTRAVENOUS
Refills: 0 | Status: DISCONTINUED | OUTPATIENT
Start: 2024-01-01 | End: 2024-01-01

## 2024-01-01 RX ORDER — SODIUM CHLORIDE 9 MG/ML
800 INJECTION INTRAMUSCULAR; INTRAVENOUS; SUBCUTANEOUS ONCE
Refills: 0 | Status: COMPLETED | OUTPATIENT
Start: 2024-01-01 | End: 2024-01-01

## 2024-01-01 RX ORDER — POTASSIUM PHOSPHATE, MONOBASIC POTASSIUM PHOSPHATE, DIBASIC 236; 224 MG/ML; MG/ML
30 INJECTION, SOLUTION INTRAVENOUS ONCE
Refills: 0 | Status: COMPLETED | OUTPATIENT
Start: 2024-01-01 | End: 2024-01-01

## 2024-01-01 RX ORDER — SODIUM CHLORIDE 9 MG/ML
3 INJECTION INTRAMUSCULAR; INTRAVENOUS; SUBCUTANEOUS EVERY 8 HOURS
Refills: 0 | Status: DISCONTINUED | OUTPATIENT
Start: 2024-01-01 | End: 2024-01-01

## 2024-01-01 RX ORDER — CEFAZOLIN SODIUM 1 G
VIAL (EA) INJECTION
Refills: 0 | Status: DISCONTINUED | OUTPATIENT
Start: 2024-01-01 | End: 2024-01-01

## 2024-01-01 RX ORDER — AMIODARONE HYDROCHLORIDE 400 MG/1
1 TABLET ORAL
Qty: 0 | Refills: 0 | DISCHARGE
Start: 2024-01-01

## 2024-01-01 RX ORDER — PANTOPRAZOLE SODIUM 20 MG/1
1 TABLET, DELAYED RELEASE ORAL
Refills: 0 | DISCHARGE

## 2024-01-01 RX ADMIN — Medication 3 MILLILITER(S): at 09:19

## 2024-01-01 RX ADMIN — PIPERACILLIN AND TAZOBACTAM 25 GRAM(S): 4; .5 INJECTION, POWDER, LYOPHILIZED, FOR SOLUTION INTRAVENOUS at 05:40

## 2024-01-01 RX ADMIN — Medication 25 MILLIGRAM(S): at 17:57

## 2024-01-01 RX ADMIN — APIXABAN 5 MILLIGRAM(S): 2.5 TABLET, FILM COATED ORAL at 17:14

## 2024-01-01 RX ADMIN — PANTOPRAZOLE SODIUM 40 MILLIGRAM(S): 20 TABLET, DELAYED RELEASE ORAL at 06:36

## 2024-01-01 RX ADMIN — PROPOFOL 8.94 MICROGRAM(S)/KG/MIN: 10 INJECTION, EMULSION INTRAVENOUS at 13:42

## 2024-01-01 RX ADMIN — Medication 975 MILLIGRAM(S): at 09:19

## 2024-01-01 RX ADMIN — MUPIROCIN 1 APPLICATION(S): 20 OINTMENT TOPICAL at 06:10

## 2024-01-01 RX ADMIN — Medication 50 MILLILITER(S): at 14:42

## 2024-01-01 RX ADMIN — Medication 3 MILLILITER(S): at 14:02

## 2024-01-01 RX ADMIN — Medication 3 MILLILITER(S): at 15:02

## 2024-01-01 RX ADMIN — Medication 1 DROP(S): at 05:41

## 2024-01-01 RX ADMIN — Medication 1 DROP(S): at 05:22

## 2024-01-01 RX ADMIN — PANTOPRAZOLE SODIUM 40 MILLIGRAM(S): 20 TABLET, DELAYED RELEASE ORAL at 05:24

## 2024-01-01 RX ADMIN — LATANOPROST 1 DROP(S): 0.05 SOLUTION/ DROPS OPHTHALMIC; TOPICAL at 22:07

## 2024-01-01 RX ADMIN — MIDAZOLAM HYDROCHLORIDE 4 MILLIGRAM(S): 1 INJECTION, SOLUTION INTRAMUSCULAR; INTRAVENOUS at 09:30

## 2024-01-01 RX ADMIN — AMIODARONE HYDROCHLORIDE 600 MILLIGRAM(S): 400 TABLET ORAL at 22:00

## 2024-01-01 RX ADMIN — Medication 1 DROP(S): at 17:26

## 2024-01-01 RX ADMIN — Medication 100 MILLIGRAM(S): at 21:30

## 2024-01-01 RX ADMIN — AMIODARONE HYDROCHLORIDE 200 MILLIGRAM(S): 400 TABLET ORAL at 06:36

## 2024-01-01 RX ADMIN — PIPERACILLIN AND TAZOBACTAM 25 GRAM(S): 4; .5 INJECTION, POWDER, LYOPHILIZED, FOR SOLUTION INTRAVENOUS at 22:39

## 2024-01-01 RX ADMIN — Medication 1 DROP(S): at 21:05

## 2024-01-01 RX ADMIN — Medication 3 MILLILITER(S): at 20:15

## 2024-01-01 RX ADMIN — Medication 100 MILLIGRAM(S): at 05:36

## 2024-01-01 RX ADMIN — LATANOPROST 1 DROP(S): 0.05 SOLUTION/ DROPS OPHTHALMIC; TOPICAL at 22:54

## 2024-01-01 RX ADMIN — PANTOPRAZOLE SODIUM 40 MILLIGRAM(S): 20 TABLET, DELAYED RELEASE ORAL at 05:40

## 2024-01-01 RX ADMIN — CHLORHEXIDINE GLUCONATE 15 MILLILITER(S): 213 SOLUTION TOPICAL at 05:22

## 2024-01-01 RX ADMIN — Medication 20 MILLIGRAM(S): at 05:20

## 2024-01-01 RX ADMIN — PANTOPRAZOLE SODIUM 40 MILLIGRAM(S): 20 TABLET, DELAYED RELEASE ORAL at 11:10

## 2024-01-01 RX ADMIN — APIXABAN 5 MILLIGRAM(S): 2.5 TABLET, FILM COATED ORAL at 05:27

## 2024-01-01 RX ADMIN — Medication 3 MILLILITER(S): at 15:31

## 2024-01-01 RX ADMIN — Medication 3 MILLILITER(S): at 03:03

## 2024-01-01 RX ADMIN — CHLORHEXIDINE GLUCONATE 1 APPLICATION(S): 213 SOLUTION TOPICAL at 13:02

## 2024-01-01 RX ADMIN — Medication 100 MILLIEQUIVALENT(S): at 07:40

## 2024-01-01 RX ADMIN — ENOXAPARIN SODIUM 70 MILLIGRAM(S): 100 INJECTION SUBCUTANEOUS at 06:11

## 2024-01-01 RX ADMIN — APIXABAN 5 MILLIGRAM(S): 2.5 TABLET, FILM COATED ORAL at 05:20

## 2024-01-01 RX ADMIN — Medication 20 MILLIGRAM(S): at 11:37

## 2024-01-01 RX ADMIN — Medication 1 MILLIGRAM(S): at 11:03

## 2024-01-01 RX ADMIN — Medication 100 MILLIGRAM(S): at 22:21

## 2024-01-01 RX ADMIN — Medication 3 MILLILITER(S): at 20:12

## 2024-01-01 RX ADMIN — Medication 100 MILLIGRAM(S): at 13:05

## 2024-01-01 RX ADMIN — ETOMIDATE 20 MILLIGRAM(S): 2 INJECTION INTRAVENOUS at 13:20

## 2024-01-01 RX ADMIN — PIPERACILLIN AND TAZOBACTAM 25 GRAM(S): 4; .5 INJECTION, POWDER, LYOPHILIZED, FOR SOLUTION INTRAVENOUS at 05:34

## 2024-01-01 RX ADMIN — Medication 250 MILLIGRAM(S): at 09:20

## 2024-01-01 RX ADMIN — LATANOPROST 1 DROP(S): 0.05 SOLUTION/ DROPS OPHTHALMIC; TOPICAL at 21:27

## 2024-01-01 RX ADMIN — CHLORHEXIDINE GLUCONATE 1 APPLICATION(S): 213 SOLUTION TOPICAL at 11:02

## 2024-01-01 RX ADMIN — AMIODARONE HYDROCHLORIDE 200 MILLIGRAM(S): 400 TABLET ORAL at 05:55

## 2024-01-01 RX ADMIN — Medication 3 MILLILITER(S): at 20:09

## 2024-01-01 RX ADMIN — LATANOPROST 1 DROP(S): 0.05 SOLUTION/ DROPS OPHTHALMIC; TOPICAL at 22:49

## 2024-01-01 RX ADMIN — Medication 1 DROP(S): at 06:37

## 2024-01-01 RX ADMIN — PIPERACILLIN AND TAZOBACTAM 3.38 GRAM(S): 4; .5 INJECTION, POWDER, LYOPHILIZED, FOR SOLUTION INTRAVENOUS at 00:00

## 2024-01-01 RX ADMIN — Medication 1 DROP(S): at 06:40

## 2024-01-01 RX ADMIN — Medication 1 DROP(S): at 18:18

## 2024-01-01 RX ADMIN — Medication 1 DROP(S): at 17:57

## 2024-01-01 RX ADMIN — SODIUM CHLORIDE 800 MILLILITER(S): 9 INJECTION INTRAMUSCULAR; INTRAVENOUS; SUBCUTANEOUS at 09:55

## 2024-01-01 RX ADMIN — PIPERACILLIN AND TAZOBACTAM 25 GRAM(S): 4; .5 INJECTION, POWDER, LYOPHILIZED, FOR SOLUTION INTRAVENOUS at 21:42

## 2024-01-01 RX ADMIN — Medication 0.5 MILLIGRAM(S): at 12:23

## 2024-01-01 RX ADMIN — Medication 20 MILLIGRAM(S): at 10:20

## 2024-01-01 RX ADMIN — Medication 650 MILLIGRAM(S): at 17:56

## 2024-01-01 RX ADMIN — FENTANYL CITRATE 25 MICROGRAM(S): 50 INJECTION INTRAVENOUS at 12:42

## 2024-01-01 RX ADMIN — Medication 20 MILLIGRAM(S): at 05:59

## 2024-01-01 RX ADMIN — Medication 100 MILLIGRAM(S): at 13:06

## 2024-01-01 RX ADMIN — Medication 25 MILLIGRAM(S): at 18:29

## 2024-01-01 RX ADMIN — PIPERACILLIN AND TAZOBACTAM 25 GRAM(S): 4; .5 INJECTION, POWDER, LYOPHILIZED, FOR SOLUTION INTRAVENOUS at 13:22

## 2024-01-01 RX ADMIN — Medication 3 MILLILITER(S): at 20:23

## 2024-01-01 RX ADMIN — Medication 100 MILLIGRAM(S): at 05:21

## 2024-01-01 RX ADMIN — MUPIROCIN 1 APPLICATION(S): 20 OINTMENT TOPICAL at 17:25

## 2024-01-01 RX ADMIN — Medication 100 MILLIGRAM(S): at 14:33

## 2024-01-01 RX ADMIN — Medication 100 MILLIGRAM(S): at 06:38

## 2024-01-01 RX ADMIN — Medication 1 DROP(S): at 17:23

## 2024-01-01 RX ADMIN — MUPIROCIN 1 APPLICATION(S): 20 OINTMENT TOPICAL at 05:19

## 2024-01-01 RX ADMIN — APIXABAN 5 MILLIGRAM(S): 2.5 TABLET, FILM COATED ORAL at 18:00

## 2024-01-01 RX ADMIN — Medication 3 MILLILITER(S): at 15:48

## 2024-01-01 RX ADMIN — Medication 100 MILLIGRAM(S): at 13:49

## 2024-01-01 RX ADMIN — MUPIROCIN 1 APPLICATION(S): 20 OINTMENT TOPICAL at 05:54

## 2024-01-01 RX ADMIN — ENOXAPARIN SODIUM 70 MILLIGRAM(S): 100 INJECTION SUBCUTANEOUS at 17:21

## 2024-01-01 RX ADMIN — HALOPERIDOL DECANOATE 5 MILLIGRAM(S): 100 INJECTION INTRAMUSCULAR at 08:50

## 2024-01-01 RX ADMIN — MUPIROCIN 1 APPLICATION(S): 20 OINTMENT TOPICAL at 18:33

## 2024-01-01 RX ADMIN — Medication 25 MILLIGRAM(S): at 17:14

## 2024-01-01 RX ADMIN — PIPERACILLIN AND TAZOBACTAM 25 GRAM(S): 4; .5 INJECTION, POWDER, LYOPHILIZED, FOR SOLUTION INTRAVENOUS at 21:27

## 2024-01-01 RX ADMIN — PIPERACILLIN AND TAZOBACTAM 200 GRAM(S): 4; .5 INJECTION, POWDER, LYOPHILIZED, FOR SOLUTION INTRAVENOUS at 09:19

## 2024-01-01 RX ADMIN — APIXABAN 5 MILLIGRAM(S): 2.5 TABLET, FILM COATED ORAL at 17:18

## 2024-01-01 RX ADMIN — Medication 25 MILLIGRAM(S): at 06:38

## 2024-01-01 RX ADMIN — Medication 3 MILLILITER(S): at 14:29

## 2024-01-01 RX ADMIN — Medication 1 DROP(S): at 17:30

## 2024-01-01 RX ADMIN — Medication 25 MILLIGRAM(S): at 18:17

## 2024-01-01 RX ADMIN — Medication 40 MILLIEQUIVALENT(S): at 12:10

## 2024-01-01 RX ADMIN — ENOXAPARIN SODIUM 70 MILLIGRAM(S): 100 INJECTION SUBCUTANEOUS at 18:05

## 2024-01-01 RX ADMIN — APIXABAN 5 MILLIGRAM(S): 2.5 TABLET, FILM COATED ORAL at 18:29

## 2024-01-01 RX ADMIN — Medication 400 MILLIGRAM(S): at 19:49

## 2024-01-01 RX ADMIN — Medication 3 MILLILITER(S): at 14:53

## 2024-01-01 RX ADMIN — Medication 20 MILLIGRAM(S): at 11:34

## 2024-01-01 RX ADMIN — Medication 975 MILLIGRAM(S): at 11:29

## 2024-01-01 RX ADMIN — Medication 1000 MILLIGRAM(S): at 20:11

## 2024-01-01 RX ADMIN — LATANOPROST 1 DROP(S): 0.05 SOLUTION/ DROPS OPHTHALMIC; TOPICAL at 21:05

## 2024-01-01 RX ADMIN — Medication 3 MILLILITER(S): at 09:13

## 2024-01-01 RX ADMIN — Medication 25 MILLIGRAM(S): at 18:15

## 2024-01-01 RX ADMIN — Medication 20 MILLIGRAM(S): at 05:26

## 2024-01-01 RX ADMIN — AMIODARONE HYDROCHLORIDE 33.3 MG/MIN: 400 TABLET ORAL at 22:49

## 2024-01-01 RX ADMIN — Medication 0.5 MILLIGRAM(S): at 23:58

## 2024-01-01 RX ADMIN — Medication 25 MILLIGRAM(S): at 11:27

## 2024-01-01 RX ADMIN — Medication 25 MILLIGRAM(S): at 17:29

## 2024-01-01 RX ADMIN — Medication 100 MILLIEQUIVALENT(S): at 08:51

## 2024-01-01 RX ADMIN — ENOXAPARIN SODIUM 70 MILLIGRAM(S): 100 INJECTION SUBCUTANEOUS at 18:37

## 2024-01-01 RX ADMIN — MUPIROCIN 1 APPLICATION(S): 20 OINTMENT TOPICAL at 05:28

## 2024-01-01 RX ADMIN — PIPERACILLIN AND TAZOBACTAM 25 GRAM(S): 4; .5 INJECTION, POWDER, LYOPHILIZED, FOR SOLUTION INTRAVENOUS at 06:21

## 2024-01-01 RX ADMIN — Medication 100 MILLIEQUIVALENT(S): at 07:09

## 2024-01-01 RX ADMIN — Medication 100 MILLIGRAM(S): at 05:54

## 2024-01-01 RX ADMIN — Medication 1 DROP(S): at 18:05

## 2024-01-01 RX ADMIN — Medication 3 MILLILITER(S): at 12:09

## 2024-01-01 RX ADMIN — Medication 25 GRAM(S): at 21:27

## 2024-01-01 RX ADMIN — Medication 100 MILLIGRAM(S): at 22:15

## 2024-01-01 RX ADMIN — APIXABAN 5 MILLIGRAM(S): 2.5 TABLET, FILM COATED ORAL at 05:40

## 2024-01-01 RX ADMIN — Medication 25 MILLIGRAM(S): at 17:18

## 2024-01-01 RX ADMIN — Medication 100 MILLIEQUIVALENT(S): at 06:11

## 2024-01-01 RX ADMIN — PROPOFOL 8.94 MICROGRAM(S)/KG/MIN: 10 INJECTION, EMULSION INTRAVENOUS at 23:59

## 2024-01-01 RX ADMIN — LATANOPROST 1 DROP(S): 0.05 SOLUTION/ DROPS OPHTHALMIC; TOPICAL at 22:35

## 2024-01-01 RX ADMIN — AMIODARONE HYDROCHLORIDE 100 MILLIGRAM(S): 400 TABLET ORAL at 06:10

## 2024-01-01 RX ADMIN — POTASSIUM PHOSPHATE, MONOBASIC POTASSIUM PHOSPHATE, DIBASIC 83.33 MILLIMOLE(S): 236; 224 INJECTION, SOLUTION INTRAVENOUS at 09:08

## 2024-01-01 RX ADMIN — PANTOPRAZOLE SODIUM 40 MILLIGRAM(S): 20 TABLET, DELAYED RELEASE ORAL at 05:26

## 2024-01-01 RX ADMIN — PIPERACILLIN AND TAZOBACTAM 25 GRAM(S): 4; .5 INJECTION, POWDER, LYOPHILIZED, FOR SOLUTION INTRAVENOUS at 11:20

## 2024-01-01 RX ADMIN — Medication 1 DROP(S): at 05:28

## 2024-01-01 RX ADMIN — Medication 3 MILLILITER(S): at 20:31

## 2024-01-01 RX ADMIN — Medication 5 MILLIGRAM(S): at 20:26

## 2024-01-01 RX ADMIN — ENOXAPARIN SODIUM 70 MILLIGRAM(S): 100 INJECTION SUBCUTANEOUS at 05:16

## 2024-01-01 RX ADMIN — MUPIROCIN 1 APPLICATION(S): 20 OINTMENT TOPICAL at 18:19

## 2024-01-01 RX ADMIN — APIXABAN 5 MILLIGRAM(S): 2.5 TABLET, FILM COATED ORAL at 18:17

## 2024-01-01 RX ADMIN — CHLORHEXIDINE GLUCONATE 1 APPLICATION(S): 213 SOLUTION TOPICAL at 18:37

## 2024-01-01 RX ADMIN — APIXABAN 5 MILLIGRAM(S): 2.5 TABLET, FILM COATED ORAL at 05:55

## 2024-01-01 RX ADMIN — Medication 1 DROP(S): at 05:20

## 2024-01-01 RX ADMIN — PANTOPRAZOLE SODIUM 40 MILLIGRAM(S): 20 TABLET, DELAYED RELEASE ORAL at 14:42

## 2024-01-01 RX ADMIN — Medication 25 MILLIGRAM(S): at 18:00

## 2024-01-01 RX ADMIN — PIPERACILLIN AND TAZOBACTAM 25 GRAM(S): 4; .5 INJECTION, POWDER, LYOPHILIZED, FOR SOLUTION INTRAVENOUS at 22:49

## 2024-01-01 RX ADMIN — Medication 0.5 MILLIGRAM(S): at 05:43

## 2024-01-01 RX ADMIN — PROPOFOL 8.94 MICROGRAM(S)/KG/MIN: 10 INJECTION, EMULSION INTRAVENOUS at 05:27

## 2024-01-01 RX ADMIN — CHLORHEXIDINE GLUCONATE 15 MILLILITER(S): 213 SOLUTION TOPICAL at 18:37

## 2024-01-01 RX ADMIN — LATANOPROST 1 DROP(S): 0.05 SOLUTION/ DROPS OPHTHALMIC; TOPICAL at 21:35

## 2024-01-01 RX ADMIN — Medication 3 MILLILITER(S): at 20:02

## 2024-01-01 RX ADMIN — PIPERACILLIN AND TAZOBACTAM 25 GRAM(S): 4; .5 INJECTION, POWDER, LYOPHILIZED, FOR SOLUTION INTRAVENOUS at 05:23

## 2024-01-01 RX ADMIN — AMIODARONE HYDROCHLORIDE 200 MILLIGRAM(S): 400 TABLET ORAL at 05:20

## 2024-01-01 RX ADMIN — MUPIROCIN 1 APPLICATION(S): 20 OINTMENT TOPICAL at 05:41

## 2024-01-01 RX ADMIN — Medication 100 MILLIEQUIVALENT(S): at 05:16

## 2024-01-01 RX ADMIN — Medication 100 MILLIGRAM(S): at 14:17

## 2024-01-01 RX ADMIN — Medication 3 MILLILITER(S): at 02:43

## 2024-01-01 RX ADMIN — Medication 3 MILLILITER(S): at 20:22

## 2024-01-01 RX ADMIN — PROPOFOL 8.94 MICROGRAM(S)/KG/MIN: 10 INJECTION, EMULSION INTRAVENOUS at 18:36

## 2024-01-01 RX ADMIN — PANTOPRAZOLE SODIUM 40 MILLIGRAM(S): 20 TABLET, DELAYED RELEASE ORAL at 06:38

## 2024-01-01 RX ADMIN — Medication 25 MILLIGRAM(S): at 05:40

## 2024-01-01 RX ADMIN — PIPERACILLIN AND TAZOBACTAM 25 GRAM(S): 4; .5 INJECTION, POWDER, LYOPHILIZED, FOR SOLUTION INTRAVENOUS at 21:06

## 2024-01-01 RX ADMIN — Medication 3 MILLILITER(S): at 03:19

## 2024-01-01 RX ADMIN — SODIUM CHLORIDE 1000 MILLILITER(S): 9 INJECTION INTRAMUSCULAR; INTRAVENOUS; SUBCUTANEOUS at 09:55

## 2024-01-01 RX ADMIN — Medication 100 MILLIEQUIVALENT(S): at 06:19

## 2024-01-01 RX ADMIN — Medication 100 MILLIGRAM(S): at 21:53

## 2024-01-01 RX ADMIN — Medication 3 MILLILITER(S): at 15:18

## 2024-01-01 RX ADMIN — PANTOPRAZOLE SODIUM 40 MILLIGRAM(S): 20 TABLET, DELAYED RELEASE ORAL at 05:59

## 2024-01-01 RX ADMIN — Medication 100 MILLIGRAM(S): at 07:04

## 2024-01-01 RX ADMIN — Medication 1 DROP(S): at 05:17

## 2024-01-01 RX ADMIN — Medication 1 DROP(S): at 05:38

## 2024-01-01 RX ADMIN — SODIUM CHLORIDE 1000 MILLILITER(S): 9 INJECTION INTRAMUSCULAR; INTRAVENOUS; SUBCUTANEOUS at 08:55

## 2024-01-01 RX ADMIN — PIPERACILLIN AND TAZOBACTAM 25 GRAM(S): 4; .5 INJECTION, POWDER, LYOPHILIZED, FOR SOLUTION INTRAVENOUS at 14:58

## 2024-01-01 RX ADMIN — Medication 3 MILLILITER(S): at 03:27

## 2024-01-01 RX ADMIN — Medication 3 MILLILITER(S): at 02:47

## 2024-01-01 RX ADMIN — Medication 100 MILLIGRAM(S): at 06:37

## 2024-01-01 RX ADMIN — LATANOPROST 1 DROP(S): 0.05 SOLUTION/ DROPS OPHTHALMIC; TOPICAL at 21:30

## 2024-01-01 RX ADMIN — PANTOPRAZOLE SODIUM 40 MILLIGRAM(S): 20 TABLET, DELAYED RELEASE ORAL at 11:02

## 2024-01-01 RX ADMIN — Medication 3 MILLILITER(S): at 20:01

## 2024-01-01 RX ADMIN — LATANOPROST 1 DROP(S): 0.05 SOLUTION/ DROPS OPHTHALMIC; TOPICAL at 22:21

## 2024-01-01 RX ADMIN — ENOXAPARIN SODIUM 70 MILLIGRAM(S): 100 INJECTION SUBCUTANEOUS at 17:25

## 2024-01-01 RX ADMIN — PIPERACILLIN AND TAZOBACTAM 25 GRAM(S): 4; .5 INJECTION, POWDER, LYOPHILIZED, FOR SOLUTION INTRAVENOUS at 05:26

## 2024-01-01 RX ADMIN — Medication 1 DROP(S): at 18:15

## 2024-01-01 RX ADMIN — LATANOPROST 1 DROP(S): 0.05 SOLUTION/ DROPS OPHTHALMIC; TOPICAL at 22:26

## 2024-01-01 RX ADMIN — SODIUM CHLORIDE 1000 MILLILITER(S): 9 INJECTION INTRAMUSCULAR; INTRAVENOUS; SUBCUTANEOUS at 13:35

## 2024-01-01 RX ADMIN — Medication 1 DROP(S): at 06:11

## 2024-01-01 RX ADMIN — Medication 1 DROP(S): at 18:32

## 2024-01-01 RX ADMIN — Medication 100 MILLIGRAM(S): at 13:41

## 2024-01-01 RX ADMIN — APIXABAN 5 MILLIGRAM(S): 2.5 TABLET, FILM COATED ORAL at 06:38

## 2024-01-01 RX ADMIN — LATANOPROST 1 DROP(S): 0.05 SOLUTION/ DROPS OPHTHALMIC; TOPICAL at 22:15

## 2024-01-01 RX ADMIN — Medication 3 MILLILITER(S): at 08:13

## 2024-01-01 RX ADMIN — Medication 100 MILLIGRAM(S): at 21:37

## 2024-01-01 RX ADMIN — AMIODARONE HYDROCHLORIDE 16.7 MG/MIN: 400 TABLET ORAL at 05:21

## 2024-01-01 RX ADMIN — MUPIROCIN 1 APPLICATION(S): 20 OINTMENT TOPICAL at 17:56

## 2024-01-01 RX ADMIN — APIXABAN 5 MILLIGRAM(S): 2.5 TABLET, FILM COATED ORAL at 17:57

## 2024-01-01 RX ADMIN — CHLORHEXIDINE GLUCONATE 15 MILLILITER(S): 213 SOLUTION TOPICAL at 05:16

## 2024-01-01 RX ADMIN — PROPOFOL 8.94 MICROGRAM(S)/KG/MIN: 10 INJECTION, EMULSION INTRAVENOUS at 05:23

## 2024-01-01 RX ADMIN — FENTANYL CITRATE 25 MICROGRAM(S): 50 INJECTION INTRAVENOUS at 11:57

## 2024-01-01 RX ADMIN — Medication 3 MILLILITER(S): at 09:42

## 2024-01-01 RX ADMIN — Medication 100 MILLIGRAM(S): at 13:22

## 2024-01-01 RX ADMIN — SODIUM CHLORIDE 500 MILLILITER(S): 9 INJECTION INTRAMUSCULAR; INTRAVENOUS; SUBCUTANEOUS at 09:55

## 2024-01-01 RX ADMIN — APIXABAN 5 MILLIGRAM(S): 2.5 TABLET, FILM COATED ORAL at 06:36

## 2024-01-01 RX ADMIN — PIPERACILLIN AND TAZOBACTAM 25 GRAM(S): 4; .5 INJECTION, POWDER, LYOPHILIZED, FOR SOLUTION INTRAVENOUS at 15:46

## 2024-01-01 RX ADMIN — PROPOFOL 9.34 MICROGRAM(S)/KG/MIN: 10 INJECTION, EMULSION INTRAVENOUS at 10:18

## 2024-01-01 RX ADMIN — APIXABAN 5 MILLIGRAM(S): 2.5 TABLET, FILM COATED ORAL at 17:29

## 2024-01-01 RX ADMIN — LATANOPROST 1 DROP(S): 0.05 SOLUTION/ DROPS OPHTHALMIC; TOPICAL at 21:53

## 2024-01-01 RX ADMIN — ENOXAPARIN SODIUM 70 MILLIGRAM(S): 100 INJECTION SUBCUTANEOUS at 05:22

## 2024-01-01 RX ADMIN — Medication 3 MILLILITER(S): at 09:25

## 2024-01-01 RX ADMIN — CHLORHEXIDINE GLUCONATE 15 MILLILITER(S): 213 SOLUTION TOPICAL at 18:05

## 2024-01-01 RX ADMIN — LATANOPROST 1 DROP(S): 0.05 SOLUTION/ DROPS OPHTHALMIC; TOPICAL at 21:37

## 2024-01-01 RX ADMIN — PROPOFOL 8.94 MICROGRAM(S)/KG/MIN: 10 INJECTION, EMULSION INTRAVENOUS at 18:06

## 2024-01-01 RX ADMIN — Medication 1 DROP(S): at 05:54

## 2024-01-01 RX ADMIN — Medication 25 MILLIGRAM(S): at 05:55

## 2024-01-01 RX ADMIN — PIPERACILLIN AND TAZOBACTAM 25 GRAM(S): 4; .5 INJECTION, POWDER, LYOPHILIZED, FOR SOLUTION INTRAVENOUS at 13:42

## 2024-01-01 RX ADMIN — AMIODARONE HYDROCHLORIDE 200 MILLIGRAM(S): 400 TABLET ORAL at 05:40

## 2024-01-01 RX ADMIN — AMIODARONE HYDROCHLORIDE 200 MILLIGRAM(S): 400 TABLET ORAL at 06:38

## 2024-01-01 RX ADMIN — Medication 1 DROP(S): at 17:14

## 2024-01-01 RX ADMIN — Medication 20 MILLIGRAM(S): at 10:58

## 2024-01-01 RX ADMIN — Medication 3 MILLILITER(S): at 09:23

## 2024-01-01 RX ADMIN — MUPIROCIN 1 APPLICATION(S): 20 OINTMENT TOPICAL at 18:30

## 2024-01-01 RX ADMIN — Medication 25 MILLIGRAM(S): at 06:36

## 2024-01-01 RX ADMIN — Medication 1 DROP(S): at 17:21

## 2024-01-01 RX ADMIN — Medication 6.98 MICROGRAM(S)/KG/MIN: at 14:52

## 2024-01-01 RX ADMIN — Medication 650 MILLIGRAM(S): at 18:56

## 2024-01-01 RX ADMIN — Medication 1 DROP(S): at 18:30

## 2024-01-01 RX ADMIN — CHLORHEXIDINE GLUCONATE 1 APPLICATION(S): 213 SOLUTION TOPICAL at 11:34

## 2024-01-01 RX ADMIN — AMIODARONE HYDROCHLORIDE 200 MILLIGRAM(S): 400 TABLET ORAL at 05:28

## 2024-01-01 RX ADMIN — Medication 1 DROP(S): at 17:18

## 2024-01-01 RX ADMIN — Medication 25 MILLIGRAM(S): at 05:20

## 2024-01-01 RX ADMIN — AMIODARONE HYDROCHLORIDE 16.7 MG/MIN: 400 TABLET ORAL at 19:33

## 2024-01-01 RX ADMIN — Medication 25 MILLIGRAM(S): at 05:28

## 2024-01-01 RX ADMIN — Medication 3 MILLILITER(S): at 14:21

## 2024-04-06 PROBLEM — I63.9 CEREBRAL INFARCTION, UNSPECIFIED: Chronic | Status: ACTIVE | Noted: 2023-01-01

## 2024-04-06 NOTE — PROCEDURE NOTE - NSINFORMCONSENT_GEN_A_CORE
This was an emergent procedure.
Verbal consent taken over the phone from PT's niece HCP/Benefits, risks, and possible complications of procedure explained to patient/caregiver who verbalized understanding and gave verbal consent.

## 2024-04-06 NOTE — ED ADULT NURSE NOTE - ED STAT RN HANDOFF DETAILS
Patient is asleep, arousable to tactile stimulation. Tachypnea noted. Patient was on 6l of oxygen via nc sat of 89-90 % noted. Patient is placed on 15l of oxygen via nonrebreather mask per Dr. Lopez. New sat of 97% noted. Patient is turned and repositioned for comfort.

## 2024-04-06 NOTE — ED ADULT NURSE REASSESSMENT NOTE - NS ED NURSE REASSESS COMMENT FT1
Patient is sedated and intubated. 23 rt lip and 7.5 tube size. Vent settings: , Peep 5, RR 20, Oxygen 100%. SBP noted in the 90's. NS is infusing per MD order. No distress noted this time. Safety maintained.

## 2024-04-06 NOTE — PATIENT PROFILE ADULT - FALL HARM RISK - HARM RISK INTERVENTIONS

## 2024-04-06 NOTE — ED PROVIDER NOTE - OBJECTIVE STATEMENT
88-year-old female with dementia on afib on Eliquis and Lasix and metoprolol also on Levaquin for the past 2 days presents with hypoxia from [facility].  Patient unable to provide any history.  EMS reported O2 sat in the field was 86 on room air.  They placed on nonrebreather.

## 2024-04-06 NOTE — ED PROVIDER NOTE - PROGRESS NOTE DETAILS
on reassessment notes left sided arm with rhythmic spastic activity-clonic. Niece at bedside states she has no known history of seizure. Patient initially somnolent, then more awake. received 1 mg ativan, then CT head, Then noted to be more hypoxic, switched to nonrebreather. Patient was then noted to be more somnolent. ABG revealed CO2 was elevated. PT intubated for airway protection. admit to ICU

## 2024-04-06 NOTE — CHART NOTE - NSCHARTNOTEFT_GEN_A_CORE
Came to see patient in ER. She was on FM and was obtunded. D/W ER attending  and ICU Dr. Dee, ABG ordered,which  showed CO2 retention and acidosis. D/W HCP, patients need for intubation. She states that patient is full code.D/W ER and ICU attending.

## 2024-04-06 NOTE — ED ADULT NURSE NOTE - OBJECTIVE STATEMENT
sent for ams   pt is lethargic  02 SAT IS 92 ON 5 6L n/c  02 sat is 90-92  tachypnea present   elevated rectal temp  code sepsis as per protocol

## 2024-04-06 NOTE — H&P ADULT - CONVERSATION DETAILS
Patient has HCP stating she would like to be DNR/DNI  Daughter at bedside signed MOLST Form   MOLST Form in chart

## 2024-04-06 NOTE — ED ADULT NURSE REASSESSMENT NOTE - NS ED NURSE REASSESS COMMENT FT1
Patient is asleep, hard to arouse. Admitted provider at the bedside. ABG completed by RT. Patient remains tachypneic. Cardiac monitoring in progress. NSR noted. Safety maintained.

## 2024-04-06 NOTE — H&P ADULT - ASSESSMENT
ASSESSMENT    89 y/o female from [facility] with pmhx of Afib, arthritis, HTN, CVA, PFO presenting to ED from nursing home for hypoxia admitted to ICU for septic shock secondary to UTI.         _________CNS___________  #AMS  -in the setting of infection and hypercapnia   -CT head negative for acute findings       _________CVS___________  #Septic Shock  -abx  -central line for pressor support    #Afib  -lovenox 70mg BID     #HTN  -hold BP meds     #elevated troponin  -TRop 98.5 -> 96.9   -EKG sinus tachycadia     #elevated BNP  -f.u echo   _________RESP__________  #Acute Respiratory failure with hypercapnia  -ETT  -repeat ABG with improvement  -chest xray small b/l pleural effusions   -confirm ETT and central line     ___________GI____________  #No acute issues     ________ RENAL__________  #No acute issues     __________MSK___________  #No acute issues       ___________ID____________  Septic shock UTI  -ABx  f//u urine cx     _________ENDO__________  #No acute issues       ______HEME/ONC_______  #Leukocytosis  -septic shock     _________SKIN____________      ________PROPHY_______  #DVT Lovenox FD   #GI     ______GOC/DISPO___________  ICU  FULL CODE          ASSESSMENT    87 y/o female from [facility] with pmhx of Afib, arthritis, HTN, CVA, PFO presenting to ED from nursing home for hypoxia admitted to ICU for septic shock secondary to UTI.         _________CNS___________  #AMS  -in the setting of infection and hypercapnia   -CT head negative for acute findings       _________CVS___________  #Septic Shock  -abx  -central line for pressor support    #Afib  -lovenox 70mg BID     #HTN  -hold BP meds     #elevated troponin  -TRop 98.5 -> 96.9   -EKG sinus tachycadia     #elevated BNP  -s/p 20 mg IV lasix  -c/w 20mg IV lasix, increase if neede   -chest xray showing pul edema   -f.u echo   _________RESP__________  #Acute Respiratory failure with hypercapnia  -ETT  -repeat ABG with improvement  -chest xray small b/l pleural effusions   -confirm ETT and central line     ___________GI____________  #No acute issues     ________ RENAL__________  #No acute issues     __________MSK___________  #No acute issues       ___________ID____________  Septic shock UTI  -ABx  f//u urine cx     _________ENDO__________  #No acute issues       ______HEME/ONC_______  #Leukocytosis  -septic shock     _________SKIN____________      ________PROPHY_______  #DVT Lovenox FD   #GI     ______GOC/DISPO___________  ICU  FULL CODE

## 2024-04-06 NOTE — ED PROVIDER NOTE - NS ED ATTENDING STATEMENT MOD
I have personally provided the amount of critical care time documented below excluding time spent on separate procedures.
normal

## 2024-04-06 NOTE — CHART NOTE - NSCHARTNOTEFT_GEN_A_CORE
Pt's family member Ms. Ritchie was contacted at [phone] for consent on Central and arterial line however not available. Writer left a message for call back, informed that patient is having low blood pressure requiring medication called pressors, this is an urgent procedure and required for continuation of care and if unable to call back within 5-10 minutes the procedure will be done as emergent procedure under physician consent.

## 2024-04-06 NOTE — ED PROVIDER NOTE - CLINICAL SUMMARY MEDICAL DECISION MAKING FREE TEXT BOX
Patient with hypoxia tachypnea mild respiratory distress.  noted fever, code sepsis called. Will do infectious workup admit.

## 2024-04-06 NOTE — ED ADULT NURSE NOTE - NSFALLHARMRISKINTERV_ED_ALL_ED

## 2024-04-06 NOTE — ED PROVIDER NOTE - CARE PLAN
1 Principal Discharge DX:	Acute sepsis   Principal Discharge DX:	Acute sepsis  Secondary Diagnosis:	Acute UTI  Secondary Diagnosis:	NSTEMI (non-ST elevation myocardial infarction)

## 2024-04-06 NOTE — H&P ADULT - HISTORY OF PRESENT ILLNESS
HPI:      PAST MEDICAL & SURGICAL HISTORY:  Hypertension      Hyperlipidemia      Glaucoma      Other nonspecific abnormal finding of lung field      CVA (cerebrovascular accident)      H/O: glaucoma  left          SOCIAL HX:   Smoking                         ETOH                            Other    FAMILY HISTORY:  FHx: hypertension    :  No known cardiovacular family hisotry     ROS:  See HPI     Allergies    No Known Allergies    Intolerances          PHYSICAL EXAM    ICU Vital Signs Last 24 Hrs  T(C): 37.1 (2024 11:19), Max: 38.8 (2024 08:51)  T(F): 98.7 (2024 11:19), Max: 101.8 (2024 08:51)  HR: 74 (2024 14:10) (74 - 105)  BP: 103/92 (2024 14:10) (93/68 - 134/110)  BP(mean): --  ABP: --  ABP(mean): --  RR: 20 (2024 14:10) (20 - 29)  SpO2: 100% (2024 13:34) (92% - 100%)    O2 Parameters below as of 2024 14:10  Patient On (Oxygen Delivery Method): ventilator            General: Not in distress  HEENT:  PETER              Lymphatic system: No LN  Lungs: Bilateral BS  Cardiovascular: Regular  Gastrointestinal: Soft, Positive BS  Musculoskeletal: No clubbing.  Moves all extremities.    Skin: Warm.  Intact  Neurological: No motor or sensory deficit         LABS:                          13.7   18.63 )-----------( 228      ( 2024 09:03 )             43.0                                               04-06    138  |  103  |  22<H>  ----------------------------<  131<H>  4.8   |  35<H>  |  0.98    Ca    9.1      2024 09:03    TPro  7.8  /  Alb  2.5<L>  /  TBili  0.9  /  DBili  x   /  AST  46<H>  /  ALT  62<H>  /  AlkPhos  211<H>  04-06      PT/INR - ( 2024 09:03 )   PT: 19.0 sec;   INR: 1.69 ratio         PTT - ( 2024 09:03 )  PTT:44.9 sec                                       Urinalysis Basic - ( 2024 09:04 )    Color: Dark Yellow / Appearance: Turbid / S.021 / pH: x  Gluc: x / Ketone: Negative mg/dL  / Bili: Small / Urobili: >=8.0 mg/dL   Blood: x / Protein: 300 mg/dL / Nitrite: Positive   Leuk Esterase: Small / RBC: 2 /HPF / WBC >50 /HPF   Sq Epi: x / Non Sq Epi: x / Bacteria: Too Numerous to count /HPF        CARDIAC MARKERS ( 2024 09:03 )  x     / x     / 71 U/L / x     / x                                                LIVER FUNCTIONS - ( 2024 09:03 )  Alb: 2.5 g/dL / Pro: 7.8 g/dL / ALK PHOS: 211 U/L / ALT: 62 U/L DA / AST: 46 U/L / GGT: x                                                                                               Mode: AC/ CMV (Assist Control/ Continuous Mandatory Ventilation)  RR (machine): 20  TV (machine): 400  FiO2: 100  PEEP: 5  ITime: 1  MAP: 11  PIP: 29                                      ABG - ( 2024 12:52 )  pH, Arterial: <7.00 pH, Blood: x     /  pCO2: >125  /  pO2: 85    / HCO3: n/a   / Base Excess: n/a   /  SaO2: 96                  CXR:    ECHO:    MEDICATIONS  (STANDING):  cefTRIAXone   IVPB 1000 milliGRAM(s) IV Intermittent every 24 hours  norepinephrine Infusion 0.05 MICROgram(s)/kG/Min (6.98 mL/Hr) IV Continuous <Continuous>    MEDICATIONS  (PRN):         HPI:  87 y/o female from [facility] with pmhx of Afib, arthritis, HTN, CVA, PFO presenting to ED from nursing home for hypoxia. Patient unable to provide any further history. Family not answering. Per NH chart, patient is on Levaquin. EMS noted patient to be hypoxic in the field.    In ED VS: T 97.6, , BP 99/52, SPo2 99% on 8LNRB   s/p 20mg IV lasix, 1mg ativan, Zosyn/ Vanc, 2LNS   Patient became hypotensive and was started on peripheral Levophed     PAST MEDICAL & SURGICAL HISTORY:  Hypertension      Hyperlipidemia      Glaucoma      Other nonspecific abnormal finding of lung field      CVA (cerebrovascular accident)      H/O: glaucoma  left          SOCIAL HX:   Smoking                         ETOH                            Other    FAMILY HISTORY:  FHx: hypertension    :  No known cardiovacular family hisotry     ROS:  See HPI     Allergies    No Known Allergies    Intolerances          PHYSICAL EXAM    ICU Vital Signs Last 24 Hrs  T(C): 37.1 (2024 11:19), Max: 38.8 (2024 08:51)  T(F): 98.7 (2024 11:19), Max: 101.8 (2024 08:51)  HR: 74 (2024 14:10) (74 - 105)  BP: 103/92 (2024 14:10) (93/68 - 134/110)  BP(mean): --  ABP: --  ABP(mean): --  RR: 20 (2024 14:10) (20 - 29)  SpO2: 100% (2024 13:34) (92% - 100%)    O2 Parameters below as of 2024 14:10  Patient On (Oxygen Delivery Method): ventilator            General: Not in distress  HEENT:  PETER              Lymphatic system: No LN  Lungs: Bilateral BS  Cardiovascular: Regular  Gastrointestinal: Soft, Positive BS  Musculoskeletal: No clubbing.  Moves all extremities.    Skin: Warm.  Intact  Neurological: No motor or sensory deficit         LABS:                          13.7   18.63 )-----------( 228      ( 2024 09:03 )             43.0                                               04-06    138  |  103  |  22<H>  ----------------------------<  131<H>  4.8   |  35<H>  |  0.98    Ca    9.1      2024 09:03    TPro  7.8  /  Alb  2.5<L>  /  TBili  0.9  /  DBili  x   /  AST  46<H>  /  ALT  62<H>  /  AlkPhos  211<H>  04-06      PT/INR - ( 2024 09:03 )   PT: 19.0 sec;   INR: 1.69 ratio         PTT - ( 2024 09:03 )  PTT:44.9 sec                                       Urinalysis Basic - ( 2024 09:04 )    Color: Dark Yellow / Appearance: Turbid / S.021 / pH: x  Gluc: x / Ketone: Negative mg/dL  / Bili: Small / Urobili: >=8.0 mg/dL   Blood: x / Protein: 300 mg/dL / Nitrite: Positive   Leuk Esterase: Small / RBC: 2 /HPF / WBC >50 /HPF   Sq Epi: x / Non Sq Epi: x / Bacteria: Too Numerous to count /HPF        CARDIAC MARKERS ( 2024 09:03 )  x     / x     / 71 U/L / x     / x                                                LIVER FUNCTIONS - ( 2024 09:03 )  Alb: 2.5 g/dL / Pro: 7.8 g/dL / ALK PHOS: 211 U/L / ALT: 62 U/L DA / AST: 46 U/L / GGT: x                                                                                               Mode: AC/ CMV (Assist Control/ Continuous Mandatory Ventilation)  RR (machine): 20  TV (machine): 400  FiO2: 100  PEEP: 5  ITime: 1  MAP: 11  PIP: 29                                      ABG - ( 2024 12:52 )  pH, Arterial: <7.00 pH, Blood: x     /  pCO2: >125  /  pO2: 85    / HCO3: n/a   / Base Excess: n/a   /  SaO2: 96                  CXR:    ECHO:    MEDICATIONS  (STANDING):  cefTRIAXone   IVPB 1000 milliGRAM(s) IV Intermittent every 24 hours  norepinephrine Infusion 0.05 MICROgram(s)/kG/Min (6.98 mL/Hr) IV Continuous <Continuous>    MEDICATIONS  (PRN):         HPI:  87 y/o female from [facility] with pmhx of Afib, arthritis, HTN, CVA, PFO, Brain Aneurysm, Lung Cancer diagnosed five years ago presenting to ED from nursing home for hypoxia. Patient unable to provide any further history. Family not answering. Per NH chart, patient is on Levaquin. EMS noted patient to be hypoxic in the field. Daughter at bedside states that she saw her mother last on Thursday and she was not feeling well at that time. Her mother recently signed a health care proxy from making her DNR/DNI.     In ED VS: T 97.6, , BP 99/52, SPo2 99% on 8LNRB   s/p 20mg IV lasix, 1mg ativan, Zosyn/ Vanc, 2LNS   Patient became hypotensive and was started on peripheral Levophed     PAST MEDICAL & SURGICAL HISTORY:  Hypertension      Hyperlipidemia      Glaucoma      Other nonspecific abnormal finding of lung field      CVA (cerebrovascular accident)      H/O: glaucoma  left          SOCIAL HX:   Smoking                         ETOH                            Other    FAMILY HISTORY:  FHx: hypertension    :  No known cardiovacular family hisotry     ROS:  See HPI     Allergies    No Known Allergies    Intolerances          PHYSICAL EXAM    ICU Vital Signs Last 24 Hrs  T(C): 37.1 (2024 11:19), Max: 38.8 (2024 08:51)  T(F): 98.7 (2024 11:19), Max: 101.8 (2024 08:51)  HR: 74 (2024 14:10) (74 - 105)  BP: 103/92 (2024 14:10) (93/68 - 134/110)  BP(mean): --  ABP: --  ABP(mean): --  RR: 20 (2024 14:10) (20 - 29)  SpO2: 100% (2024 13:34) (92% - 100%)    O2 Parameters below as of 2024 14:10  Patient On (Oxygen Delivery Method): ventilator            General: Not in distress  HEENT:  PETER              Lymphatic system: No LN  Lungs: Bilateral BS  Cardiovascular: Regular  Gastrointestinal: Soft, Positive BS  Musculoskeletal: No clubbing.  Moves all extremities.    Skin: Warm.  Intact  Neurological: No motor or sensory deficit         LABS:                          13.7   18.63 )-----------( 228      ( 2024 09:03 )             43.0                                               04-06    138  |  103  |  22<H>  ----------------------------<  131<H>  4.8   |  35<H>  |  0.98    Ca    9.1      2024 09:03    TPro  7.8  /  Alb  2.5<L>  /  TBili  0.9  /  DBili  x   /  AST  46<H>  /  ALT  62<H>  /  AlkPhos  211<H>  04-06      PT/INR - ( 2024 09:03 )   PT: 19.0 sec;   INR: 1.69 ratio         PTT - ( 2024 09:03 )  PTT:44.9 sec                                       Urinalysis Basic - ( 2024 09:04 )    Color: Dark Yellow / Appearance: Turbid / S.021 / pH: x  Gluc: x / Ketone: Negative mg/dL  / Bili: Small / Urobili: >=8.0 mg/dL   Blood: x / Protein: 300 mg/dL / Nitrite: Positive   Leuk Esterase: Small / RBC: 2 /HPF / WBC >50 /HPF   Sq Epi: x / Non Sq Epi: x / Bacteria: Too Numerous to count /HPF        CARDIAC MARKERS ( 2024 09:03 )  x     / x     / 71 U/L / x     / x                                                LIVER FUNCTIONS - ( 2024 09:03 )  Alb: 2.5 g/dL / Pro: 7.8 g/dL / ALK PHOS: 211 U/L / ALT: 62 U/L DA / AST: 46 U/L / GGT: x                                                                                               Mode: AC/ CMV (Assist Control/ Continuous Mandatory Ventilation)  RR (machine): 20  TV (machine): 400  FiO2: 100  PEEP: 5  ITime: 1  MAP: 11  PIP: 29                                      ABG - ( 2024 12:52 )  pH, Arterial: <7.00 pH, Blood: x     /  pCO2: >125  /  pO2: 85    / HCO3: n/a   / Base Excess: n/a   /  SaO2: 96                  CXR:    ECHO:    MEDICATIONS  (STANDING):  cefTRIAXone   IVPB 1000 milliGRAM(s) IV Intermittent every 24 hours  norepinephrine Infusion 0.05 MICROgram(s)/kG/Min (6.98 mL/Hr) IV Continuous <Continuous>    MEDICATIONS  (PRN):         HPI:  87 y/o female from [facility] AAOX 1-2 at baseline with pmhx of Afib, arthritis, HTN, CVA, PFO, Brain Aneurysm, Lung Cancer diagnosed five years ago presenting to ED from nursing home for hypoxia. Patient unable to provide any further history. Family not answering. Per NH chart, patient is on Levaquin. EMS noted patient to be hypoxic in the field. Daughter at bedside states that she saw her mother last on Thursday and she was not feeling well at that time. Her mother recently signed a health care proxy from making her DNR/DNI.     In ED VS: T 97.6, , BP 99/52, SPo2 99% on 8LNRB   s/p 20mg IV lasix, 1mg ativan, Zosyn/ Vanc, 2LNS   Patient became hypotensive and was started on peripheral Levophed     PAST MEDICAL & SURGICAL HISTORY:  Hypertension      Hyperlipidemia      Glaucoma      Other nonspecific abnormal finding of lung field      CVA (cerebrovascular accident)      H/O: glaucoma  left          SOCIAL HX:   Smoking                         ETOH                            Other    FAMILY HISTORY:  FHx: hypertension    :  No known cardiovacular family hisotry     ROS:  See HPI     Allergies    No Known Allergies    Intolerances          PHYSICAL EXAM    ICU Vital Signs Last 24 Hrs  T(C): 37.1 (2024 11:19), Max: 38.8 (2024 08:51)  T(F): 98.7 (2024 11:19), Max: 101.8 (2024 08:51)  HR: 74 (2024 14:10) (74 - 105)  BP: 103/92 (2024 14:10) (93/68 - 134/110)  BP(mean): --  ABP: --  ABP(mean): --  RR: 20 (2024 14:10) (20 - 29)  SpO2: 100% (2024 13:34) (92% - 100%)    O2 Parameters below as of 2024 14:10  Patient On (Oxygen Delivery Method): ventilator            General: Not in distress  HEENT:  PETER              Lymphatic system: No LN  Lungs: Bilateral BS  Cardiovascular: Regular  Gastrointestinal: Soft, Positive BS  Musculoskeletal: No clubbing.  Moves all extremities.    Skin: Warm.  Intact  Neurological: No motor or sensory deficit         LABS:                          13.7   18.63 )-----------( 228      ( 2024 09:03 )             43.0                                               04-    138  |  103  |  22<H>  ----------------------------<  131<H>  4.8   |  35<H>  |  0.98    Ca    9.1      2024 09:03    TPro  7.8  /  Alb  2.5<L>  /  TBili  0.9  /  DBili  x   /  AST  46<H>  /  ALT  62<H>  /  AlkPhos  211<H>  04-      PT/INR - ( 2024 09:03 )   PT: 19.0 sec;   INR: 1.69 ratio         PTT - ( 2024 09:03 )  PTT:44.9 sec                                       Urinalysis Basic - ( 2024 09:04 )    Color: Dark Yellow / Appearance: Turbid / S.021 / pH: x  Gluc: x / Ketone: Negative mg/dL  / Bili: Small / Urobili: >=8.0 mg/dL   Blood: x / Protein: 300 mg/dL / Nitrite: Positive   Leuk Esterase: Small / RBC: 2 /HPF / WBC >50 /HPF   Sq Epi: x / Non Sq Epi: x / Bacteria: Too Numerous to count /HPF        CARDIAC MARKERS ( 2024 09:03 )  x     / x     / 71 U/L / x     / x                                                LIVER FUNCTIONS - ( 2024 09:03 )  Alb: 2.5 g/dL / Pro: 7.8 g/dL / ALK PHOS: 211 U/L / ALT: 62 U/L DA / AST: 46 U/L / GGT: x                                                                                               Mode: AC/ CMV (Assist Control/ Continuous Mandatory Ventilation)  RR (machine): 20  TV (machine): 400  FiO2: 100  PEEP: 5  ITime: 1  MAP: 11  PIP: 29                                      ABG - ( 2024 12:52 )  pH, Arterial: <7.00 pH, Blood: x     /  pCO2: >125  /  pO2: 85    / HCO3: n/a   / Base Excess: n/a   /  SaO2: 96                  CXR:    ECHO:    MEDICATIONS  (STANDING):  cefTRIAXone   IVPB 1000 milliGRAM(s) IV Intermittent every 24 hours  norepinephrine Infusion 0.05 MICROgram(s)/kG/Min (6.98 mL/Hr) IV Continuous <Continuous>    MEDICATIONS  (PRN):         HPI:  89 y/o female from [facility] AAOX 1-2 at baseline with pmhx of Afib, arthritis, HTN, CVA, PFO, Brain Aneurysm, Lung Cancer diagnosed five years ago presenting to ED from nursing home for hypoxia. Patient unable to provide any further history. Family not answering. Per NH chart, patient is on Levaquin. EMS noted patient to be hypoxic in the field. Daughter at bedside states that she saw her mother last on Thursday and she was not feeling well at that time. Her mother recently signed a health care proxy form making her DNR/DNI.     In ED VS: T 97.6, , BP 99/52, SPo2 99% on 8LNRB   s/p 20mg IV lasix, 1mg ativan, Zosyn/ Vanc, 2LNS   Patient became hypotensive and was started on peripheral Levophed     PAST MEDICAL & SURGICAL HISTORY:  Hypertension      Hyperlipidemia      Glaucoma      Other nonspecific abnormal finding of lung field      CVA (cerebrovascular accident)      H/O: glaucoma  left          SOCIAL HX:   Smoking                         ETOH                            Other    FAMILY HISTORY:  FHx: hypertension    :  No known cardiovacular family hisotry     ROS:  See HPI     Allergies    No Known Allergies    Intolerances          PHYSICAL EXAM    ICU Vital Signs Last 24 Hrs  T(C): 37.1 (2024 11:19), Max: 38.8 (2024 08:51)  T(F): 98.7 (2024 11:19), Max: 101.8 (2024 08:51)  HR: 74 (2024 14:10) (74 - 105)  BP: 103/92 (2024 14:10) (93/68 - 134/110)  BP(mean): --  ABP: --  ABP(mean): --  RR: 20 (2024 14:10) (20 - 29)  SpO2: 100% (2024 13:34) (92% - 100%)    O2 Parameters below as of 2024 14:10  Patient On (Oxygen Delivery Method): ventilator            General: Not in distress  HEENT:  PETER              Lymphatic system: No LN  Lungs: Bilateral BS  Cardiovascular: Regular  Gastrointestinal: Soft, Positive BS  Musculoskeletal: No clubbing.  Moves all extremities.    Skin: Warm.  Intact  Neurological: No motor or sensory deficit         LABS:                          13.7   18.63 )-----------( 228      ( 2024 09:03 )             43.0                                               04-    138  |  103  |  22<H>  ----------------------------<  131<H>  4.8   |  35<H>  |  0.98    Ca    9.1      2024 09:03    TPro  7.8  /  Alb  2.5<L>  /  TBili  0.9  /  DBili  x   /  AST  46<H>  /  ALT  62<H>  /  AlkPhos  211<H>  04-      PT/INR - ( 2024 09:03 )   PT: 19.0 sec;   INR: 1.69 ratio         PTT - ( 2024 09:03 )  PTT:44.9 sec                                       Urinalysis Basic - ( 2024 09:04 )    Color: Dark Yellow / Appearance: Turbid / S.021 / pH: x  Gluc: x / Ketone: Negative mg/dL  / Bili: Small / Urobili: >=8.0 mg/dL   Blood: x / Protein: 300 mg/dL / Nitrite: Positive   Leuk Esterase: Small / RBC: 2 /HPF / WBC >50 /HPF   Sq Epi: x / Non Sq Epi: x / Bacteria: Too Numerous to count /HPF        CARDIAC MARKERS ( 2024 09:03 )  x     / x     / 71 U/L / x     / x                                                LIVER FUNCTIONS - ( 2024 09:03 )  Alb: 2.5 g/dL / Pro: 7.8 g/dL / ALK PHOS: 211 U/L / ALT: 62 U/L DA / AST: 46 U/L / GGT: x                                                                                               Mode: AC/ CMV (Assist Control/ Continuous Mandatory Ventilation)  RR (machine): 20  TV (machine): 400  FiO2: 100  PEEP: 5  ITime: 1  MAP: 11  PIP: 29                                      ABG - ( 2024 12:52 )  pH, Arterial: <7.00 pH, Blood: x     /  pCO2: >125  /  pO2: 85    / HCO3: n/a   / Base Excess: n/a   /  SaO2: 96                  CXR:    ECHO:    MEDICATIONS  (STANDING):  cefTRIAXone   IVPB 1000 milliGRAM(s) IV Intermittent every 24 hours  norepinephrine Infusion 0.05 MICROgram(s)/kG/Min (6.98 mL/Hr) IV Continuous <Continuous>    MEDICATIONS  (PRN):         HPI:  89 y/o female from [facility] AAOX 1-2 at baseline with pmhx of Afib, arthritis, HTN, CVA, PFO, Brain Aneurysm, Lung Cancer diagnosed five years ago presenting to ED from nursing home for hypoxia. Patient unable to provide any further history. Family not answering. Per NH chart, patient is on Levaquin. EMS noted patient to be hypoxic in the field. Daughter at bedside states that she saw her mother last on Thursday and she was not feeling well at that time. Her mother recently signed a health care proxy form making her DNR/DNI.     In ED VS: T 97.6, , BP 99/52, SPo2 99% on 8LNRB   s/p 20mg IV lasix, 1mg ativan, Zosyn/ Vanc, 2LNS   Patient became hypotensive and was started on peripheral Levophed     PAST MEDICAL & SURGICAL HISTORY:  Hypertension      Hyperlipidemia      Glaucoma      Other nonspecific abnormal finding of lung field      CVA (cerebrovascular accident)      H/O: glaucoma  left          SOCIAL HX:   Smoking                         ETOH                            Other    FAMILY HISTORY:  FHx: hypertension    :  No known cardiovacular family hisotry     ROS:  See HPI     Allergies    No Known Allergies    Intolerances          PHYSICAL EXAM    ICU Vital Signs Last 24 Hrs  T(C): 37.1 (2024 11:19), Max: 38.8 (2024 08:51)  T(F): 98.7 (2024 11:19), Max: 101.8 (2024 08:51)  HR: 74 (2024 14:10) (74 - 105)  BP: 103/92 (2024 14:10) (93/68 - 134/110)  BP(mean): --  ABP: --  ABP(mean): --  RR: 20 (2024 14:10) (20 - 29)  SpO2: 100% (2024 13:34) (92% - 100%)    O2 Parameters below as of 2024 14:10  Patient On (Oxygen Delivery Method): ventilator            General: Intubated and sedation   HEENT:  PER             Lymphatic system: No LN  Lungs: Bilateral BS, + crackles  Cardiovascular: Regular  Gastrointestinal: Soft, Positive BS  Musculoskeletal: Wasted   Skin: Warm.  Intact  Neurological: sedated not arousabel, does not localize        LABS:                          13.7   18.63 )-----------( 228      ( 2024 09:03 )             43.0                                               04-    138  |  103  |  22<H>  ----------------------------<  131<H>  4.8   |  35<H>  |  0.98    Ca    9.1      2024 09:03    TPro  7.8  /  Alb  2.5<L>  /  TBili  0.9  /  DBili  x   /  AST  46<H>  /  ALT  62<H>  /  AlkPhos  211<H>  -      PT/INR - ( 2024 09:03 )   PT: 19.0 sec;   INR: 1.69 ratio         PTT - ( 2024 09:03 )  PTT:44.9 sec                                       Urinalysis Basic - ( 2024 09:04 )    Color: Dark Yellow / Appearance: Turbid / S.021 / pH: x  Gluc: x / Ketone: Negative mg/dL  / Bili: Small / Urobili: >=8.0 mg/dL   Blood: x / Protein: 300 mg/dL / Nitrite: Positive   Leuk Esterase: Small / RBC: 2 /HPF / WBC >50 /HPF   Sq Epi: x / Non Sq Epi: x / Bacteria: Too Numerous to count /HPF        CARDIAC MARKERS ( 2024 09:03 )  x     / x     / 71 U/L / x     / x                                                LIVER FUNCTIONS - ( 2024 09:03 )  Alb: 2.5 g/dL / Pro: 7.8 g/dL / ALK PHOS: 211 U/L / ALT: 62 U/L DA / AST: 46 U/L / GGT: x                                                                                               Mode: AC/ CMV (Assist Control/ Continuous Mandatory Ventilation)  RR (machine): 20  TV (machine): 400  FiO2: 100  PEEP: 5  ITime: 1  MAP: 11  PIP: 29                                      ABG - ( 2024 12:52 )  pH, Arterial: <7.00 pH, Blood: x     /  pCO2: >125  /  pO2: 85    / HCO3: n/a   / Base Excess: n/a   /  SaO2: 96                  CXR:    ECHO:    MEDICATIONS  (STANDING):  cefTRIAXone   IVPB 1000 milliGRAM(s) IV Intermittent every 24 hours  norepinephrine Infusion 0.05 MICROgram(s)/kG/Min (6.98 mL/Hr) IV Continuous <Continuous>    MEDICATIONS  (PRN):

## 2024-04-06 NOTE — H&P ADULT - ATTENDING COMMENTS
89 y/o female from [facility] with pmhx of Afib, arthritis, HTN, CVA, PFO presenting to ED from nursing home for hypoxia admitted to ICU for septic shock secondary to UTI. Intubated for acute hypercapnic respiratory failure.     Assessment:  1. Acute encephalopathy  2. Septic shock  3, UTI  4. Chronic afib   5. Acute hypercapnic respiratory failure     Plan:  - Cont. mechanical ventilation  - Sedation and pain control for vent synchrony   - Check post intubation ABG  - Broad spectrum antibiotics  - Follow up culture results  - Hold all antihypertensives for now  - Vasopressor support to maintain MAP > 65   - NPO for now  - Cont, AC for now  - IV fluid hydration  - Central line placed   - Admit to ICU   - Prognosis is guarded  - DNR code status

## 2024-04-06 NOTE — ED PROVIDER NOTE - PHYSICAL EXAMINATION
Tachycardic lungs clear abdomen nontender noted to be tachypneic O2 sat 98 on nonrebreather decreases to 90 on 4 L nasal cannula.  Abdomen nontender patient is not able to provide any history.  No evidence of bruising or trauma.  Noted purulent nasal drainage from the nostrils.

## 2024-04-07 NOTE — CONSULT NOTE ADULT - SUBJECTIVE AND OBJECTIVE BOX
Date of Service  24 @ 11:07    CHIEF COMPLAINT:Patient is a 88y old  Female who presents with a chief complaint of AMS, hypoxia (2024 00:56)      HPI:  HPI:  89 y/o female from [facility] AAOX 1-2 at baseline with pmhx of Parox Afib, arthritis, HTN, CVA, PFO, Brain Aneurysm, Lung Cancer diagnosed five years ago presenting to ED from nursing home for hypoxia. Patient unable to provide any further history. Family not answering. Per NH chart, patient is on Levaquin. EMS noted patient to be hypoxic in the field. Daughter at bedside states that she saw her mother last on Thursday and she was not feeling well at that time. IN Er, pt treated for sepsis, IVF,abx. As per ER attending seizure activity seen, ativan 1mg x1, sent to CT head with FM. I saw pt in ER obtunded, abg oredered.Patient intubated in ER for Co2 retention and obtunded, HCP agreed for intubation.    In ED VS: T 97.6, , BP 99/52, SPo2 99% on 8LNRB   s/p 20mg IV lasix, 1mg ativan, Zosyn/ Vanc, 2LNS   Patient became hypotensive and was started on peripheral Levophed     PAST MEDICAL & SURGICAL HISTORY:  Hypertension      Hyperlipidemia      Glaucoma      Other nonspecific abnormal finding of lung field      CVA (cerebrovascular accident)      H/O: glaucoma  left              Allergies    No Known Allergies    Intolerances          PHYSICAL EXAM    ICU Vital Signs Last 24 Hrs  T(C): 37.1 (2024 11:19), Max: 38.8 (2024 08:51)  T(F): 98.7 (2024 11:19), Max: 101.8 (2024 08:51)  HR: 74 (2024 14:10) (74 - 105)  BP: 103/92 (2024 14:10) (93/68 - 134/110)  BP(mean): --  ABP: --  ABP(mean): --  RR: 20 (2024 14:10) (20 - 29)  SpO2: 100% (2024 13:34) (92% - 100%)    O2 Parameters below as of 2024 14:10  Patient On (Oxygen Delivery Method): ventilator            General: Intubated and sedation   HEENT:  PER             Lymphatic system: No LN  Lungs: Bilateral BS, + crackles  Cardiovascular: Regular  Gastrointestinal: Soft, Positive BS  Musculoskeletal: Wasted   Skin: Warm.  Intact  Neurological: sedated not arousabel, does not localize        LABS:                          13.7   18.63 )-----------( 228      ( 2024 09:03 )             43.0                                               04-06    138  |  103  |  22<H>  ----------------------------<  131<H>  4.8   |  35<H>  |  0.98    Ca    9.1      2024 09:03    TPro  7.8  /  Alb  2.5<L>  /  TBili  0.9  /  DBili  x   /  AST  46<H>  /  ALT  62<H>  /  AlkPhos  211<H>  04-06      PT/INR - ( 2024 09:03 )   PT: 19.0 sec;   INR: 1.69 ratio         PTT - ( 2024 09:03 )  PTT:44.9 sec                                       Urinalysis Basic - ( 2024 09:04 )    Color: Dark Yellow / Appearance: Turbid / S.021 / pH: x  Gluc: x / Ketone: Negative mg/dL  / Bili: Small / Urobili: >=8.0 mg/dL   Blood: x / Protein: 300 mg/dL / Nitrite: Positive   Leuk Esterase: Small / RBC: 2 /HPF / WBC >50 /HPF   Sq Epi: x / Non Sq Epi: x / Bacteria: Too Numerous to count /HPF        CARDIAC MARKERS ( 2024 09:03 )  x     / x     / 71 U/L / x     / x                                                LIVER FUNCTIONS - ( 2024 09:03 )  Alb: 2.5 g/dL / Pro: 7.8 g/dL / ALK PHOS: 211 U/L / ALT: 62 U/L DA / AST: 46 U/L / GGT: x                                                                                               Mode: AC/ CMV (Assist Control/ Continuous Mandatory Ventilation)  RR (machine): 20  TV (machine): 400  FiO2: 100  PEEP: 5  ITime: 1  MAP: 11  PIP: 29                                      ABG - ( 2024 12:52 )  pH, Arterial: <7.00 pH, Blood: x     /  pCO2: >125  /  pO2: 85    / HCO3: n/a   / Base Excess: n/a   /  SaO2: 96                          MEDICATIONS  (STANDING):  artificial  tears Solution 1 Drop(s) Both EYES two times a day  chlorhexidine 0.12% Liquid 15 milliLiter(s) Oral Mucosa every 12 hours  chlorhexidine 2% Cloths 1 Application(s) Topical daily  enoxaparin Injectable 70 milliGRAM(s) SubCutaneous every 12 hours  furosemide   Injectable 20 milliGRAM(s) IV Push once  latanoprost 0.005% Ophthalmic Solution 1 Drop(s) Both EYES at bedtime  norepinephrine Infusion 0.1 MICROgram(s)/kG/Min (14 mL/Hr) IV Continuous <Continuous>  piperacillin/tazobactam IVPB.. 3.375 Gram(s) IV Intermittent every 8 hours  propofol Infusion 20 MICROgram(s)/kG/Min (8.94 mL/Hr) IV Continuous <Continuous>    MEDICATIONS  (PRN):  sodium chloride 0.9% lock flush 10 milliLiter(s) IV Push every 1 hour PRN Pre/post blood products, medications, blood draw, and to maintain line patency      FAMILY HISTORY:  FHx: hypertension        SOCIAL HISTORY:    [x ] Non-smoker    [x ] Alcohol-denies    Allergies    No Known Allergies    Intolerances    	    REVIEW OF SYSTEMS:  	  [x ] Unable to obtain    PHYSICAL EXAM:  T(C): 36.9 (24 @ 11:00), Max: 38.3 (24 @ 20:05)  HR: 77 (24 @ 11:00) (67 - 98)  BP: 99/64 (24 @ 11:00) (72/54 - 146/128)  RR: 16 (24 @ 11:00) (0 - 34)  SpO2: 100% (24 @ 11:00) (92% - 100%)  Wt(kg): --  I&O's Summary    2024 07:01  -  2024 07:00  --------------------------------------------------------  IN: 902.6 mL / OUT: 485 mL / NET: 417.6 mL    2024 07:01  -  2024 11:07  --------------------------------------------------------  IN: 65.2 mL / OUT: 60 mL / NET: 5.2 mL        Appearance: Normal	  HEENT:   Normal oral mucosa, PERRL, EOMI	  Lymphatic: No lymphadenopathy  Cardiovascular: Normal S1 S2, No JVD, No murmurs, No edema  Respiratory: Dec BS at bases	  Psychiatry: A & O x 3, Mood & affect appropriate  Gastrointestinal:  Soft, Non-tender, + BS	  Skin: No rashes, No ecchymoses, No cyanosis	  Neurologic: Non-focal  Extremities: Normal range of motion, No clubbing, cyanosis or edema  Vascular: Peripheral pulses palpable 2+ bilaterally    	    ECG:  	sinus tach,lvh  	  	  LABS:	 	    CARDIAC MARKERS ( 2024 09:03 )  x     / x     / 71 U/L / x     / x          Troponin I, High Sensitivity Result: 96.9 ng/L (24 @ 09:03)  Troponin I, High Sensitivity Result: 98.5 ng/L (24 @ 09:03)                          11.7   20.52 )-----------( 209      ( 2024 04:00 )             35.4     04-07    142  |  109<H>  |  31<H>  ----------------------------<  120<H>  3.3<L>   |  29  |  1.12    Ca    8.7      2024 04:00  Phos  1.4     04-07  Mg     1.8     04-07    TPro  6.1  /  Alb  2.0<L>  /  TBili  1.1  /  DBili  x   /  AST  33  /  ALT  42  /  AlkPhos  149<H>  04-07    < from: TTE with Doppler (w/Cont) (Transthoracic Echocardiogram) (10.30.23 @ 07:22) >  OBSERVATIONS:  Mitral Valve: Normal mitral valve. Trace mitral  regurgitation.  Aortic Root: Aortic Root: 3.5 cm. Ascending aorta not well  seen.  Aortic Valve: Trileaflet aortic valve. No aortic stenosis.  Mild aortic regurgitation.  Left Atrium: Normal left atrium.  LA volume index = 21  cc/m2.  Left Ventricle: Normal Left Ventricular Systolic Function,  (EF = 62 % by biplane) No regional wall motion  abnormalities. Mildly increased left ventricular wall  thickness.  The diastolic function is indeterminate based  on current diagnostic criteria.  Right Heart: Right atrium not well visualized.  Right  ventricle not well visualized, grossly normal size and  function on limited views.Normal tricuspid valve. Mild  tricuspid regurgitation. Pulmonic valve not well seen.  Trace pulmonic insufficiency is noted.  Pericardium/PleuraTrivial pericardial effusion is seen.  Ovoid hypoechoic structure (4.4cm x 2.9cm), lateral to the  left atrium and inferolateral wall, with some mass effect.  Consider dedicated chest CT for further evaluation as  clincially indicated.  Hemodynamic: Unable to estimate RA pressure.  RVSP is  increased; Unable to accurately estimate RVSP. TR velocity  is 3.6m/s.  Agitated saline injection notable for probable  patent foramen ovale or atrial septal defect. Consider  additional dedicated imaging as clinically indicated.    < end of copied text >  < from: Xray Chest 1 View-PORTABLE IMMEDIATE (Xray Chest 1 View-PORTABLE IMMEDIATE .) (24 @ 16:43) >  ACC: [id number] EXAM:  XR CHEST PORTABLE IMMED 1V   ORDERED BY: RAGHAVENDRA OCHOA     PROCEDURE DATE:  2024          INTERPRETATION:  AP chest on 2024 at 4:35 PM. Patient was   intubated.    Heart magnified by technique. Right thoracic curve again noted.    Bibasilar effusions right greater than left and interstitial infiltration   possibly CHF and the lungs are similar to earlier study in the day.    Present film shows endotracheal tube and right jugular line inserted.    IMPRESSION: Endotracheal tube and right jugular line inserted. Persistent   lung and pleural findings possibly congestive.    < end of copied text >  < from: CT Head No Cont (24 @ 11:57) >  ACC: [id number] EXAM:  CT BRAIN   ORDERED BY: MICHAEL ANGUIANO     PROCEDURE DATE:  2024          INTERPRETATION:  CLINICAL INDICATIONS:  seizure activity    COMPARISON: Head CT dated 10/27/2023. MRI brain dated 10/9/2023    TECHNIQUE: Noncontrast CT of the head. Multiplanar reformations are   submitted.    FINDINGS:  Chronic posterior left frontal lobe infarction.  There is periventricular and subcortical white matter hypodensity without   mass effect, nonspecific, likely representing moderate chronic   microvascular ischemic changes. There is no compelling evidence for an   acute transcortical infarction. There is no evidence of mass, mass   effect, midline shift or extra-axial fluid collection. The lateral   ventricles and cortical sulci are age-appropriate in size and   configuration. Moderate inflammatory mucosal changes are seen throughout   the various portions of the paranasal sinuses. The orbits and mastoid air   cells are unremarkable. The calvarium is intact. Consider MRI as   clinically warranted.    IMPRESSION:  Moderate chronic microvascular changes without evidence of   an acute transcortical infarction or hemorrhage.    --- End of Report ---            AGATA MADISON MD; Attending Radiologist  This document has been electronically signed. 2024 12:14PM    < end of copied text >

## 2024-04-07 NOTE — PROGRESS NOTE ADULT - ASSESSMENT
ASSESSMENT    89 y/o female from [facility] with pmhx of Afib, arthritis, HTN, CVA, PFO presenting to ED from nursing home for hypoxia admitted to ICU for septic shock secondary to UTI.         _________CNS___________  #AMS  -in the setting of infection and hypercapnia   -CT head negative for acute findings       _________CVS___________  #Septic Shock  -abx  -central line for pressor support    #Afib  -lovenox 70mg BID     #HTN  -hold BP meds     #elevated troponin  -TRop 98.5 -> 96.9   -EKG sinus tachycadia     #elevated BNP  -s/p 20 mg IV lasix  -c/w 20mg IV lasix, increase if neede   -chest xray showing pul edema   -f.u echo   _________RESP__________  #Acute Respiratory failure with hypercapnia  -ETT  -repeat ABG with improvement  -chest xray small b/l pleural effusions   -confirm ETT and central line     ___________GI____________  #No acute issues     ________ RENAL__________  #No acute issues     __________MSK___________  #No acute issues       ___________ID____________  Septic shock UTI  -ABx  f//u urine cx     _________ENDO__________  #No acute issues       ______HEME/ONC_______  #Leukocytosis  -septic shock     _________SKIN____________      ________PROPHY_______  #DVT Lovenox FD   #GI     ______GOC/DISPO___________  ICU  FULL CODE      87 y/o female from [facility] with pmhx of Afib, arthritis, HTN, CVA, PFO presenting to ED from nursing home for hypoxia. In ED, pt was severely hypercarbic requiring emergent intubation. Pt admitted to ICU for acute hypercarbic respiratory failure and septic shock secondary to UTI.         _________CNS___________  #AMS  -in the setting of infection and hypercapnia   -CT head negative for acute findings   -pt intubated in ED  -Passed SAT but failed SBT 4/7      _________CVS___________  #Septic Shock   -UA+, Also concern for aspiration PNA  -Pt started on zosyn  -central line for pressor support  -ID for zosyn approval, Dr. Serra    #Afib  -lovenox 70mg BID   -Holding BB while BP low    #HTN  -hold BP meds     #elevated troponin  -TRop 98.5 -> 96.9   -EKG sinus tachycadia     #elevated BNP  -chest xray showing pul edema   -f/u echo results  _________RESP__________  #Acute Respiratory failure with hypercapnia  -Pt intubated in ED  -Repeat ABG showing improvement  -Pt failed SBT 4/7    ___________GI____________  #No acute issues     ________ RENAL__________  #No acute issues     __________MSK___________  #No acute issues       ___________ID____________  Septic shock UTI  -UCx growing E. Coli  -ABx as above  -f/u BCx  -f/u Sputum Cx    _________ENDO__________  #No acute issues       ______HEME/ONC_______  #Leukocytosis  -In s/o septic shock     _________SKIN____________      ________PROPHY_______  #DVT Lovenox FD   #GI PPI     ______GOC/DISPO___________  ICU  FULL CODE

## 2024-04-07 NOTE — PROGRESS NOTE ADULT - SUBJECTIVE AND OBJECTIVE BOX
INTERVAL HPI/OVERNIGHT EVENTS:     PRESSORS: [ ] YES [ ] NO  WHICH:    ANTIBIOTICS:                  DATE STARTED:  ANTIBIOTICS:                  DATE STARTED:  ANTIBIOTICS:                  DATE STARTED:    Antimicrobial:  piperacillin/tazobactam IVPB.. 3.375 Gram(s) IV Intermittent every 8 hours    Cardiovascular:  norepinephrine Infusion 0.1 MICROgram(s)/kG/Min IV Continuous <Continuous>    Pulmonary:    Hematalogic:  enoxaparin Injectable 70 milliGRAM(s) SubCutaneous every 12 hours    Other:  artificial  tears Solution 1 Drop(s) Both EYES two times a day  chlorhexidine 0.12% Liquid 15 milliLiter(s) Oral Mucosa every 12 hours  chlorhexidine 2% Cloths 1 Application(s) Topical daily  latanoprost 0.005% Ophthalmic Solution 1 Drop(s) Both EYES at bedtime  propofol Infusion 20 MICROgram(s)/kG/Min IV Continuous <Continuous>  sodium chloride 0.9% lock flush 10 milliLiter(s) IV Push every 1 hour PRN      Drug Dosing Weight  Height (cm): 170.2 (27 Oct 2023 14:51)  Weight (kg): 77.8 (2024 17:18)  BMI (kg/m2): 26.9 (2024 17:18)  BSA (m2): 1.89 (2024 17:18)    CENTRAL LINE: [ ] YES [ ] NO  LOCATION:   DATE INSERTED:  REMOVE: [ ] YES [ ] NO  EXPLAIN:    CRUZ: [ ] YES [ ] NO    DATE INSERTED:  REMOVE:  [ ] YES [ ] NO  EXPLAIN:    A-LINE:  [ ] YES [ ] NO  LOCATION:   DATE INSERTED:  REMOVE:  [ ] YES [ ] NO  EXPLAIN:    PMH/Social Hx/Fam Hx -reviewed admission note, no change since admission  PAST MEDICAL & SURGICAL HISTORY:  Hypertension      Hyperlipidemia      Glaucoma      Other nonspecific abnormal finding of lung field      CVA (cerebrovascular accident)      H/O: glaucoma  left        Heart faliure: acute [ ] chronic [ ] acute or chronic [ ] diastolic [ ] systolic [ ] combied systolic and diastolic[ ]  JOSE MARIA: ATN[ ] renal medullary necrosis [ ] CKD I [ ]CKDII [ ]CKD III [ ]CKD IV [ ]CKD V [ ]Other pathological lesions [ ]  Abdominal Nutrition Status: malnutrition [ ] cachexia [ ] morbid obesity/BMI=40 [ ] Supplement ordered [___________]     T(C): 37 (24 @ 00:45), Max: 38.8 (24 @ 08:51)  HR: 70 (24 @ 00:45)  BP: 127/80 (24 @ 00:45)  BP(mean): 92 (24 @ 00:45)  ABP: --  ABP(mean): --  RR: 20 (24 @ 00:45)  SpO2: 100% (24 @ 00:45)  Wt(kg): --    ABG - ( 2024 15:20 )  pH, Arterial: 7.40  pH, Blood: x     /  pCO2: 46    /  pO2: 279   / HCO3: 28    / Base Excess: 3.1   /  SaO2: 100                       Mode: AC/ CMV (Assist Control/ Continuous Mandatory Ventilation)  RR (machine): 20  TV (machine): 400  FiO2: 60  PEEP: 5  ITime: 1  MAP: 12  PIP: 27      PHYSICAL EXAM:    GENERAL: [ ]NAD, [ ]well-groomed, [ ]well-developed  HEAD:  [ ]Atraumatic, [ ]Normocephalic  EYES: [ ]EOMI, [ ]PERRLA, [ ]conjunctiva and sclera clear  ENMT: [ ]No tonsillar erythema, exudates, or enlargement; [ ]Moist mucous membranes, [ ]Good dentition, [ ]No lesions  NECK: [ ]Supple, normal appearance, [ ]No JVD; [ ]Normal thyroid; [ ]Trachea midline  NERVOUS SYSTEM:  [ ]Alert & Oriented X3, [ ]Good concentration; [ ]Motor Strength 5/5 B/L upper and lower extremities; [ ]DTRs 2+ intact and symmetric  CHEST/LUNG: [ ]No chest deformity; [ ]Normal percussion bilaterally; [ ]No rales, rhonchi, wheezing; [ ]Crackles at bases  HEART: [ ]Regular rate and rhythm; [ ]No murmurs, rubs, or gallops  ABDOMEN: [ ]Soft, Nontender, Nondistended; [ ]Bowel sounds present  EXTREMITIES:  [ ]2+ Peripheral Pulses, [ ]No clubbing, cyanosis, or edema [ ]Bilat lower extremity edema  LYMPH: [ ]No lymphadenopathy noted  SKIN: [ ]No rashes or lesions; [ ]Good capillary refill      LABS:  CBC Full  -  ( 2024 09:03 )  WBC Count : 18.63 K/uL  RBC Count : 5.68 M/uL  Hemoglobin : 13.7 g/dL  Hematocrit : 43.0 %  Platelet Count - Automated : 228 K/uL  Mean Cell Volume : 75.7 fl  Mean Cell Hemoglobin : 24.1 pg  Mean Cell Hemoglobin Concentration : 31.9 gm/dL  Auto Neutrophil # : 16.18 K/uL  Auto Lymphocyte # : 0.50 K/uL  Auto Monocyte # : 1.63 K/uL  Auto Eosinophil # : 0.11 K/uL  Auto Basophil # : 0.05 K/uL  Auto Neutrophil % : 86.8 %  Auto Lymphocyte % : 2.7 %  Auto Monocyte % : 8.7 %  Auto Eosinophil % : 0.6 %  Auto Basophil % : 0.3 %        138  |  103  |  22<H>  ----------------------------<  131<H>  4.8   |  35<H>  |  0.98    Ca    9.1      2024 09:03    TPro  7.8  /  Alb  2.5<L>  /  TBili  0.9  /  DBili  x   /  AST  46<H>  /  ALT  62<H>  /  AlkPhos  211<H>  04-06    PT/INR - ( 2024 09:03 )   PT: 19.0 sec;   INR: 1.69 ratio         PTT - ( 2024 09:03 )  PTT:44.9 sec  Urinalysis Basic - ( 2024 09:04 )    Color: Dark Yellow / Appearance: Turbid / S.021 / pH: x  Gluc: x / Ketone: Negative mg/dL  / Bili: Small / Urobili: >=8.0 mg/dL   Blood: x / Protein: 300 mg/dL / Nitrite: Positive   Leuk Esterase: Small / RBC: 2 /HPF / WBC >50 /HPF   Sq Epi: x / Non Sq Epi: x / Bacteria: Too Numerous to count /HPF          RADIOLOGY & ADDITIONAL STUDIES REVIEWED:      [ ]GOALS OF CARE DISCUSSION WITH PATIENT/FAMILY/PROXY:    CRITICAL CARE TIME SPENT: 35 minutes INTERVAL HPI/OVERNIGHT EVENTS:     No acute events overnight.  Patient examined at bedside this AM.  Patient denies acute complaints     PRESSORS: [ ] YES [x] NO  WHICH:    Antimicrobial:  piperacillin/tazobactam IVPB.. 3.375 Gram(s) IV Intermittent every 8 hours    Cardiovascular:  norepinephrine Infusion 0.1 MICROgram(s)/kG/Min IV Continuous <Continuous>    Pulmonary:    Hematalogic:  enoxaparin Injectable 70 milliGRAM(s) SubCutaneous every 12 hours    Other:  artificial  tears Solution 1 Drop(s) Both EYES two times a day  chlorhexidine 0.12% Liquid 15 milliLiter(s) Oral Mucosa every 12 hours  chlorhexidine 2% Cloths 1 Application(s) Topical daily  latanoprost 0.005% Ophthalmic Solution 1 Drop(s) Both EYES at bedtime  propofol Infusion 20 MICROgram(s)/kG/Min IV Continuous <Continuous>  sodium chloride 0.9% lock flush 10 milliLiter(s) IV Push every 1 hour PRN    artificial  tears Solution 1 Drop(s) Both EYES two times a day  chlorhexidine 0.12% Liquid 15 milliLiter(s) Oral Mucosa every 12 hours  chlorhexidine 2% Cloths 1 Application(s) Topical daily  enoxaparin Injectable 70 milliGRAM(s) SubCutaneous every 12 hours  latanoprost 0.005% Ophthalmic Solution 1 Drop(s) Both EYES at bedtime  norepinephrine Infusion 0.1 MICROgram(s)/kG/Min IV Continuous <Continuous>  piperacillin/tazobactam IVPB.. 3.375 Gram(s) IV Intermittent every 8 hours  propofol Infusion 20 MICROgram(s)/kG/Min IV Continuous <Continuous>  sodium chloride 0.9% lock flush 10 milliLiter(s) IV Push every 1 hour PRN    Drug Dosing Weight  Height (cm): 170.2 (27 Oct 2023 14:51)  Weight (kg): 77.8 (06 Apr 2024 17:18)  BMI (kg/m2): 26.9 (06 Apr 2024 17:18)  BSA (m2): 1.89 (06 Apr 2024 17:18)    CENTRAL LINE: [x] YES [ ] NO  LOCATION:   DATE INSERTED:  REMOVE: [ ] YES [ ] NO  EXPLAIN:    CRUZ: [x] YES [ ] NO    DATE INSERTED:  REMOVE:  [ ] YES [ ] NO  EXPLAIN:    A-LINE:  [ ] YES [ x] NO  LOCATION:   DATE INSERTED:  REMOVE:  [ ] YES [ ] NO  EXPLAIN:    PMH -reviewed admission note, no change since admission  PAST MEDICAL & SURGICAL HISTORY:  Hypertension      Hyperlipidemia      Glaucoma      Other nonspecific abnormal finding of lung field      CVA (cerebrovascular accident)      H/O: glaucoma  left          ICU Vital Signs Last 24 Hrs  T(C): 37.2 (07 Apr 2024 12:30), Max: 38.3 (06 Apr 2024 20:05)  T(F): 99 (07 Apr 2024 12:30), Max: 100.9 (06 Apr 2024 20:05)  HR: 90 (07 Apr 2024 12:30) (67 - 98)  BP: 96/68 (07 Apr 2024 12:30) (72/54 - 146/128)  BP(mean): 78 (07 Apr 2024 12:30) (62 - 127)  ABP: --  ABP(mean): --  RR: 23 (07 Apr 2024 12:30) (0 - 34)  SpO2: 100% (07 Apr 2024 12:30) (92% - 100%)    O2 Parameters below as of 07 Apr 2024 11:00  Patient On (Oxygen Delivery Method): ventilator  O2 Flow (L/min): 30          ABG - ( 07 Apr 2024 09:56 )  pH, Arterial: 7.45  pH, Blood: x     /  pCO2: 44    /  pO2: 149   / HCO3: 31    / Base Excess: 5.8   /  SaO2: 99                    04-06 @ 07:01  -  04-07 @ 07:00  --------------------------------------------------------  IN: 902.6 mL / OUT: 485 mL / NET: 417.6 mL        Mode: AC/ CMV (Assist Control/ Continuous Mandatory Ventilation)  RR (machine): 16  TV (machine): 400  FiO2: 40  PEEP: 5  ITime: 1  MAP: 10  PIP: 24      PHYSICAL EXAM:    GENERAL: Intubated, Sedated, lying in bed  HEAD: Atraumatic, Normocephalic  EYES: EOMI, anicteric   ENMT: MMM; No tonsillar erythema or exudates  NECK: Supple, normal appearance, No JVD; Normal thyroid; Trachea midline  NERVOUS SYSTEM:  Sedated  CHEST/LUNG: No chest deformity; No rales or wheezing   HEART: Regular rate and rhythm; No abnormal heart sounds  ABDOMEN: Soft, Nontender, Nondistended; Bowel sounds present  EXTREMITIES:  2+ Peripheral Pulses, No appreciable edema  LYMPH: No lymphadenopathy noted  SKIN: No rashes or lesions;  Good capillary refill      LABS:  CBC Full  -  ( 07 Apr 2024 04:00 )  WBC Count : 20.52 K/uL  RBC Count : 4.72 M/uL  Hemoglobin : 11.7 g/dL  Hematocrit : 35.4 %  Platelet Count - Automated : 209 K/uL  Mean Cell Volume : 75.0 fl  Mean Cell Hemoglobin : 24.8 pg  Mean Cell Hemoglobin Concentration : 33.1 gm/dL  Auto Neutrophil # : x  Auto Lymphocyte # : x  Auto Monocyte # : x  Auto Eosinophil # : x  Auto Basophil # : x  Auto Neutrophil % : x  Auto Lymphocyte % : x  Auto Monocyte % : x  Auto Eosinophil % : x  Auto Basophil % : x    04-07    142  |  109<H>  |  31<H>  ----------------------------<  120<H>  3.3<L>   |  29  |  1.12    Ca    8.7      07 Apr 2024 04:00  Phos  1.4     04-07  Mg     1.8     04-07    TPro  6.1  /  Alb  2.0<L>  /  TBili  1.1  /  DBili  x   /  AST  33  /  ALT  42  /  AlkPhos  149<H>  04-07    PT/INR - ( 06 Apr 2024 09:03 )   PT: 19.0 sec;   INR: 1.69 ratio         PTT - ( 06 Apr 2024 09:03 )  PTT:44.9 sec  Urinalysis Basic - ( 07 Apr 2024 04:00 )    Color: x / Appearance: x / SG: x / pH: x  Gluc: 120 mg/dL / Ketone: x  / Bili: x / Urobili: x   Blood: x / Protein: x / Nitrite: x   Leuk Esterase: x / RBC: x / WBC x   Sq Epi: x / Non Sq Epi: x / Bacteria: x      Culture Results:   >100,000 CFU/ml Escherichia coli (04-06 @ 09:04)      RADIOLOGY & ADDITIONAL STUDIES REVIEWED:     < from: CT Head No Cont (04.06.24 @ 11:57) >  IMPRESSION:  Moderate chronic microvascular changes without evidence of   an acute transcortical infarction or hemorrhage.    < end of copied text >      [x ]GOALS OF CARE DISCUSSION WITH PATIENT/FAMILY/PROXY:    CRITICAL CARE TIME SPENT: 35 minutes

## 2024-04-08 NOTE — DIETITIAN INITIAL EVALUATION ADULT - MALNUTRITION
PCM (moderate) related to acute  as evidenced by <50% needs met> 5 days (average/ NPO day #3), 1+ generalized edema.

## 2024-04-08 NOTE — DIETITIAN INITIAL EVALUATION ADULT - PERTINENT LABORATORY DATA
04-08    143  |  110<H>  |  27<H>  ----------------------------<  93  3.5   |  31  |  1.11    Ca    8.2<L>      08 Apr 2024 03:45  Phos  2.5     04-08  Mg     1.8     04-08    TPro  6.0  /  Alb  1.7<L>  /  TBili  0.6  /  DBili  x   /  AST  16  /  ALT  32  /  AlkPhos  126<H>  04-08  POCT Blood Glucose.: 103 mg/dL (04-08-24 @ 10:52)  A1C with Estimated Average Glucose Result: 5.7 % (10-28-23 @ 06:10)

## 2024-04-08 NOTE — PROGRESS NOTE ADULT - SUBJECTIVE AND OBJECTIVE BOX
INTERVAL HPI/OVERNIGHT EVENTS: ***    PRESSORS: [ ] YES [ ] NO  WHICH:    Antimicrobial:  piperacillin/tazobactam IVPB.. 3.375 Gram(s) IV Intermittent every 8 hours    Cardiovascular:  norepinephrine Infusion 0.1 MICROgram(s)/kG/Min IV Continuous <Continuous>    Pulmonary:    Hematalogic:  enoxaparin Injectable 70 milliGRAM(s) SubCutaneous every 12 hours    Other:  artificial  tears Solution 1 Drop(s) Both EYES two times a day  chlorhexidine 0.12% Liquid 15 milliLiter(s) Oral Mucosa every 12 hours  chlorhexidine 2% Cloths 1 Application(s) Topical daily  latanoprost 0.005% Ophthalmic Solution 1 Drop(s) Both EYES at bedtime  pantoprazole  Injectable 40 milliGRAM(s) IV Push daily  propofol Infusion 20 MICROgram(s)/kG/Min IV Continuous <Continuous>  sodium chloride 0.9% lock flush 10 milliLiter(s) IV Push every 1 hour PRN    artificial  tears Solution 1 Drop(s) Both EYES two times a day  chlorhexidine 0.12% Liquid 15 milliLiter(s) Oral Mucosa every 12 hours  chlorhexidine 2% Cloths 1 Application(s) Topical daily  enoxaparin Injectable 70 milliGRAM(s) SubCutaneous every 12 hours  latanoprost 0.005% Ophthalmic Solution 1 Drop(s) Both EYES at bedtime  norepinephrine Infusion 0.1 MICROgram(s)/kG/Min IV Continuous <Continuous>  pantoprazole  Injectable 40 milliGRAM(s) IV Push daily  piperacillin/tazobactam IVPB.. 3.375 Gram(s) IV Intermittent every 8 hours  propofol Infusion 20 MICROgram(s)/kG/Min IV Continuous <Continuous>  sodium chloride 0.9% lock flush 10 milliLiter(s) IV Push every 1 hour PRN    Drug Dosing Weight  Height (cm): 170.2 (27 Oct 2023 14:51)  Weight (kg): 77.8 (06 Apr 2024 17:18)  BMI (kg/m2): 26.9 (06 Apr 2024 17:18)  BSA (m2): 1.89 (06 Apr 2024 17:18)    CENTRAL LINE: [ ] YES [ ] NO  LOCATION:   DATE INSERTED:  REMOVE: [ ] YES [ ] NO  EXPLAIN:    CRUZ: [ ] YES [ ] NO    DATE INSERTED:  REMOVE:  [ ] YES [ ] NO  EXPLAIN:    A-LINE:  [ ] YES [ ] NO  LOCATION:   DATE INSERTED:  REMOVE:  [ ] YES [ ] NO  EXPLAIN:    PMH -reviewed admission note, no change since admission  PAST MEDICAL & SURGICAL HISTORY:  Hypertension      Hyperlipidemia      Glaucoma      Other nonspecific abnormal finding of lung field      CVA (cerebrovascular accident)      H/O: glaucoma  left          ICU Vital Signs Last 24 Hrs  T(C): 37.2 (08 Apr 2024 07:00), Max: 37.3 (07 Apr 2024 14:15)  T(F): 99 (08 Apr 2024 07:00), Max: 99.1 (07 Apr 2024 14:15)  HR: 87 (08 Apr 2024 07:00) (73 - 94)  BP: 88/59 (08 Apr 2024 07:00) (84/60 - 112/70)  BP(mean): 70 (08 Apr 2024 07:00) (64 - 85)  ABP: --  ABP(mean): --  RR: 16 (08 Apr 2024 07:00) (5 - 34)  SpO2: 100% (08 Apr 2024 07:00) (91% - 100%)    O2 Parameters below as of 08 Apr 2024 07:00  Patient On (Oxygen Delivery Method): ventilator    O2 Concentration (%): 40        ABG - ( 08 Apr 2024 03:03 )  pH, Arterial: 7.47  pH, Blood: x     /  pCO2: 46    /  pO2: 153   / HCO3: 34    / Base Excess: 8.6   /  SaO2: 100                   04-07 @ 07:01  -  04-08 @ 07:00  --------------------------------------------------------  IN: 940.3 mL / OUT: 1355 mL / NET: -414.7 mL        Mode: AC/ CMV (Assist Control/ Continuous Mandatory Ventilation)  RR (machine): 16  TV (machine): 400  FiO2: 40  PEEP: 5  ITime: 1  MAP: 10  PIP: 26      PHYSICAL EXAM:    GENERAL: NAD, pt sitting comfortably in bed  HEAD:  Atraumatic, Normocephalic  EYES: EOMI, anicteric   ENMT: MMM; No tonsillar erythema or exudates  NECK: Supple, normal appearance, No JVD; Normal thyroid; Trachea midline  NERVOUS SYSTEM:  Alert & Oriented X3, no appreciable gross or focal deficits    CHEST/LUNG: No chest deformity; No rales or wheezing   HEART: Regular rate and rhythm; No abnormal heart sounds  ABDOMEN: Soft, Nontender, Nondistended; Bowel sounds present  EXTREMITIES:  2+ Peripheral Pulses, No appreciable edema  LYMPH: No lymphadenopathy noted  SKIN: No rashes or lesions;  Good capillary refill      LABS:  CBC Full  -  ( 08 Apr 2024 03:45 )  WBC Count : 13.38 K/uL  RBC Count : 4.21 M/uL  Hemoglobin : 10.4 g/dL  Hematocrit : 30.9 %  Platelet Count - Automated : 206 K/uL  Mean Cell Volume : 73.4 fl  Mean Cell Hemoglobin : 24.7 pg  Mean Cell Hemoglobin Concentration : 33.7 gm/dL  Auto Neutrophil # : 10.34 K/uL  Auto Lymphocyte # : 1.07 K/uL  Auto Monocyte # : 1.52 K/uL  Auto Eosinophil # : 0.24 K/uL  Auto Basophil # : 0.04 K/uL  Auto Neutrophil % : 77.2 %  Auto Lymphocyte % : 8.0 %  Auto Monocyte % : 11.4 %  Auto Eosinophil % : 1.8 %  Auto Basophil % : 0.3 %    04-08    143  |  110<H>  |  27<H>  ----------------------------<  93  3.5   |  31  |  1.11    Ca    8.2<L>      08 Apr 2024 03:45  Phos  2.5     04-08  Mg     1.8     04-08    TPro  6.0  /  Alb  1.7<L>  /  TBili  0.6  /  DBili  x   /  AST  16  /  ALT  32  /  AlkPhos  126<H>  04-08    PT/INR - ( 06 Apr 2024 09:03 )   PT: 19.0 sec;   INR: 1.69 ratio         PTT - ( 06 Apr 2024 09:03 )  PTT:44.9 sec  Urinalysis Basic - ( 08 Apr 2024 03:45 )    Color: x / Appearance: x / SG: x / pH: x  Gluc: 93 mg/dL / Ketone: x  / Bili: x / Urobili: x   Blood: x / Protein: x / Nitrite: x   Leuk Esterase: x / RBC: x / WBC x   Sq Epi: x / Non Sq Epi: x / Bacteria: x      Culture Results:   >100,000 CFU/ml Escherichia coli (04-06 @ 09:04)  Culture Results:   No growth at 24 hours (04-06 @ 09:03)  Culture Results:   No growth at 24 hours (04-06 @ 09:03)      RADIOLOGY & ADDITIONAL STUDIES REVIEWED:  ***    [ ]GOALS OF CARE DISCUSSION WITH PATIENT/FAMILY/PROXY:    CRITICAL CARE TIME SPENT: 35 minutes INTERVAL HPI/OVERNIGHT EVENTS: ***    PRESSORS: [ ] YES [X ] NO  WHICH:    Antimicrobial:  piperacillin/tazobactam IVPB.. 3.375 Gram(s) IV Intermittent every 8 hours      Pulmonary:    Hematalogic:  enoxaparin Injectable 70 milliGRAM(s) SubCutaneous every 12 hours    Other:  artificial  tears Solution 1 Drop(s) Both EYES two times a day  chlorhexidine 0.12% Liquid 15 milliLiter(s) Oral Mucosa every 12 hours  chlorhexidine 2% Cloths 1 Application(s) Topical daily  latanoprost 0.005% Ophthalmic Solution 1 Drop(s) Both EYES at bedtime  pantoprazole  Injectable 40 milliGRAM(s) IV Push daily  propofol Infusion 20 MICROgram(s)/kG/Min IV Continuous <Continuous>  sodium chloride 0.9% lock flush 10 milliLiter(s) IV Push every 1 hour PRN    artificial  tears Solution 1 Drop(s) Both EYES two times a day  chlorhexidine 0.12% Liquid 15 milliLiter(s) Oral Mucosa every 12 hours  chlorhexidine 2% Cloths 1 Application(s) Topical daily  enoxaparin Injectable 70 milliGRAM(s) SubCutaneous every 12 hours  latanoprost 0.005% Ophthalmic Solution 1 Drop(s) Both EYES at bedtime  norepinephrine Infusion 0.1 MICROgram(s)/kG/Min IV Continuous <Continuous>  pantoprazole  Injectable 40 milliGRAM(s) IV Push daily  piperacillin/tazobactam IVPB.. 3.375 Gram(s) IV Intermittent every 8 hours  propofol Infusion 20 MICROgram(s)/kG/Min IV Continuous <Continuous>  sodium chloride 0.9% lock flush 10 milliLiter(s) IV Push every 1 hour PRN    Drug Dosing Weight  Height (cm): 170.2 (27 Oct 2023 14:51)  Weight (kg): 77.8 (06 Apr 2024 17:18)  BMI (kg/m2): 26.9 (06 Apr 2024 17:18)  BSA (m2): 1.89 (06 Apr 2024 17:18)    CENTRAL LINE: [ ] YES [x ] NO  LOCATION:   DATE INSERTED:  REMOVE: [ ] YES [ ] NO  EXPLAIN:    CRUZ: [x ] YES [ ] NO    DATE INSERTED:  REMOVE:  [ ] YES [ ] NO  EXPLAIN:    A-LINE:  [ ] YES [x ] NO  LOCATION:   DATE INSERTED:  REMOVE:  [ ] YES [ ] NO  EXPLAIN:    PMH -reviewed admission note, no change since admission  PAST MEDICAL & SURGICAL HISTORY:  Hypertension      Hyperlipidemia      Glaucoma      Other nonspecific abnormal finding of lung field      CVA (cerebrovascular accident)      H/O: glaucoma  left          ICU Vital Signs Last 24 Hrs  T(C): 37.2 (08 Apr 2024 07:00), Max: 37.3 (07 Apr 2024 14:15)  T(F): 99 (08 Apr 2024 07:00), Max: 99.1 (07 Apr 2024 14:15)  HR: 87 (08 Apr 2024 07:00) (73 - 94)  BP: 88/59 (08 Apr 2024 07:00) (84/60 - 112/70)  BP(mean): 70 (08 Apr 2024 07:00) (64 - 85)  ABP: --  ABP(mean): --  RR: 16 (08 Apr 2024 07:00) (5 - 34)  SpO2: 100% (08 Apr 2024 07:00) (91% - 100%)    O2 Parameters below as of 08 Apr 2024 07:00  Patient On (Oxygen Delivery Method): ventilator    O2 Concentration (%): 40        ABG - ( 08 Apr 2024 03:03 )  pH, Arterial: 7.47  pH, Blood: x     /  pCO2: 46    /  pO2: 153   / HCO3: 34    / Base Excess: 8.6   /  SaO2: 100                   04-07 @ 07:01  -  04-08 @ 07:00  --------------------------------------------------------  IN: 940.3 mL / OUT: 1355 mL / NET: -414.7 mL        Mode: AC/ CMV (Assist Control/ Continuous Mandatory Ventilation)  RR (machine): 16  TV (machine): 400  FiO2: 40  PEEP: 5  ITime: 1  MAP: 10  PIP: 26      PHYSICAL EXAM:    GENERAL: off sedation is responsive to stimuli   HEAD:  Atraumatic, Normocephalic  EYES: EOMI, anicteric   ENMT: MMM; No tonsillar erythema or exudates  NECK: Supple, normal appearance, No JVD; Normal thyroid; Trachea midline  NERVOUS SYSTEM:  alert to stimuli, +gag reflex  CHEST/LUNG: No chest deformity; No rales or wheezing   HEART: Regular rate and rhythm; No abnormal heart sounds  ABDOMEN: Soft, Nontender, Nondistended; Bowel sounds present  EXTREMITIES:  2+ Peripheral Pulses, No appreciable edema  LYMPH: No lymphadenopathy noted  SKIN: No rashes or lesions;  Good capillary refill      LABS:  CBC Full  -  ( 08 Apr 2024 03:45 )  WBC Count : 13.38 K/uL  RBC Count : 4.21 M/uL  Hemoglobin : 10.4 g/dL  Hematocrit : 30.9 %  Platelet Count - Automated : 206 K/uL  Mean Cell Volume : 73.4 fl  Mean Cell Hemoglobin : 24.7 pg  Mean Cell Hemoglobin Concentration : 33.7 gm/dL  Auto Neutrophil # : 10.34 K/uL  Auto Lymphocyte # : 1.07 K/uL  Auto Monocyte # : 1.52 K/uL  Auto Eosinophil # : 0.24 K/uL  Auto Basophil # : 0.04 K/uL  Auto Neutrophil % : 77.2 %  Auto Lymphocyte % : 8.0 %  Auto Monocyte % : 11.4 %  Auto Eosinophil % : 1.8 %  Auto Basophil % : 0.3 %    04-08    143  |  110<H>  |  27<H>  ----------------------------<  93  3.5   |  31  |  1.11    Ca    8.2<L>      08 Apr 2024 03:45  Phos  2.5     04-08  Mg     1.8     04-08    TPro  6.0  /  Alb  1.7<L>  /  TBili  0.6  /  DBili  x   /  AST  16  /  ALT  32  /  AlkPhos  126<H>  04-08    PT/INR - ( 06 Apr 2024 09:03 )   PT: 19.0 sec;   INR: 1.69 ratio         PTT - ( 06 Apr 2024 09:03 )  PTT:44.9 sec  Urinalysis Basic - ( 08 Apr 2024 03:45 )    Color: x / Appearance: x / SG: x / pH: x  Gluc: 93 mg/dL / Ketone: x  / Bili: x / Urobili: x   Blood: x / Protein: x / Nitrite: x   Leuk Esterase: x / RBC: x / WBC x   Sq Epi: x / Non Sq Epi: x / Bacteria: x      Culture Results:   >100,000 CFU/ml Escherichia coli (04-06 @ 09:04)  Culture Results:   No growth at 24 hours (04-06 @ 09:03)  Culture Results:   No growth at 24 hours (04-06 @ 09:03)      RADIOLOGY & ADDITIONAL STUDIES REVIEWED:  ***    [ ]GOALS OF CARE DISCUSSION WITH PATIENT/FAMILY/PROXY:    CRITICAL CARE TIME SPENT: 35 minutes INTERVAL HPI/OVERNIGHT EVENTS: No acute overnight events. Patient in the AM passed SAT and self extubated. Monitoring on NC for now    PRESSORS: [ ] YES [X ] NO  WHICH:    Antimicrobial:  piperacillin/tazobactam IVPB.. 3.375 Gram(s) IV Intermittent every 8 hours      Pulmonary:    Hematalogic:  enoxaparin Injectable 70 milliGRAM(s) SubCutaneous every 12 hours    Other:  artificial  tears Solution 1 Drop(s) Both EYES two times a day  chlorhexidine 0.12% Liquid 15 milliLiter(s) Oral Mucosa every 12 hours  chlorhexidine 2% Cloths 1 Application(s) Topical daily  latanoprost 0.005% Ophthalmic Solution 1 Drop(s) Both EYES at bedtime  pantoprazole  Injectable 40 milliGRAM(s) IV Push daily  propofol Infusion 20 MICROgram(s)/kG/Min IV Continuous <Continuous>  sodium chloride 0.9% lock flush 10 milliLiter(s) IV Push every 1 hour PRN    artificial  tears Solution 1 Drop(s) Both EYES two times a day  chlorhexidine 0.12% Liquid 15 milliLiter(s) Oral Mucosa every 12 hours  chlorhexidine 2% Cloths 1 Application(s) Topical daily  enoxaparin Injectable 70 milliGRAM(s) SubCutaneous every 12 hours  latanoprost 0.005% Ophthalmic Solution 1 Drop(s) Both EYES at bedtime  norepinephrine Infusion 0.1 MICROgram(s)/kG/Min IV Continuous <Continuous>  pantoprazole  Injectable 40 milliGRAM(s) IV Push daily  piperacillin/tazobactam IVPB.. 3.375 Gram(s) IV Intermittent every 8 hours  propofol Infusion 20 MICROgram(s)/kG/Min IV Continuous <Continuous>  sodium chloride 0.9% lock flush 10 milliLiter(s) IV Push every 1 hour PRN    Drug Dosing Weight  Height (cm): 170.2 (27 Oct 2023 14:51)  Weight (kg): 77.8 (06 Apr 2024 17:18)  BMI (kg/m2): 26.9 (06 Apr 2024 17:18)  BSA (m2): 1.89 (06 Apr 2024 17:18)    CENTRAL LINE: [ ] YES [x ] NO  LOCATION:   DATE INSERTED:  REMOVE: [ ] YES [ ] NO  EXPLAIN:    CRUZ: [x ] YES [ ] NO    DATE INSERTED:  REMOVE:  [ ] YES [ ] NO  EXPLAIN:    A-LINE:  [ ] YES [x ] NO  LOCATION:   DATE INSERTED:  REMOVE:  [ ] YES [ ] NO  EXPLAIN:    PMH -reviewed admission note, no change since admission  PAST MEDICAL & SURGICAL HISTORY:  Hypertension      Hyperlipidemia      Glaucoma      Other nonspecific abnormal finding of lung field      CVA (cerebrovascular accident)      H/O: glaucoma  left          ICU Vital Signs Last 24 Hrs  T(C): 37.2 (08 Apr 2024 07:00), Max: 37.3 (07 Apr 2024 14:15)  T(F): 99 (08 Apr 2024 07:00), Max: 99.1 (07 Apr 2024 14:15)  HR: 87 (08 Apr 2024 07:00) (73 - 94)  BP: 88/59 (08 Apr 2024 07:00) (84/60 - 112/70)  BP(mean): 70 (08 Apr 2024 07:00) (64 - 85)  ABP: --  ABP(mean): --  RR: 16 (08 Apr 2024 07:00) (5 - 34)  SpO2: 100% (08 Apr 2024 07:00) (91% - 100%)    O2 Parameters below as of 08 Apr 2024 07:00  Patient On (Oxygen Delivery Method): ventilator    O2 Concentration (%): 40        ABG - ( 08 Apr 2024 03:03 )  pH, Arterial: 7.47  pH, Blood: x     /  pCO2: 46    /  pO2: 153   / HCO3: 34    / Base Excess: 8.6   /  SaO2: 100                   04-07 @ 07:01  -  04-08 @ 07:00  --------------------------------------------------------  IN: 940.3 mL / OUT: 1355 mL / NET: -414.7 mL        Mode: AC/ CMV (Assist Control/ Continuous Mandatory Ventilation)  RR (machine): 16  TV (machine): 400  FiO2: 40  PEEP: 5  ITime: 1  MAP: 10  PIP: 26      PHYSICAL EXAM:    GENERAL: off sedation is responsive to stimuli   HEAD:  Atraumatic, Normocephalic  EYES: EOMI, anicteric   ENMT: MMM; No tonsillar erythema or exudates  NECK: Supple, normal appearance, No JVD; Normal thyroid; Trachea midline  NERVOUS SYSTEM:  alert to stimuli, +gag reflex  CHEST/LUNG: No chest deformity; No rales or wheezing   HEART: Regular rate and rhythm; No abnormal heart sounds  ABDOMEN: Soft, Nontender, Nondistended; Bowel sounds present  EXTREMITIES:  2+ Peripheral Pulses, No appreciable edema  LYMPH: No lymphadenopathy noted  SKIN: No rashes or lesions;  Good capillary refill      LABS:  CBC Full  -  ( 08 Apr 2024 03:45 )  WBC Count : 13.38 K/uL  RBC Count : 4.21 M/uL  Hemoglobin : 10.4 g/dL  Hematocrit : 30.9 %  Platelet Count - Automated : 206 K/uL  Mean Cell Volume : 73.4 fl  Mean Cell Hemoglobin : 24.7 pg  Mean Cell Hemoglobin Concentration : 33.7 gm/dL  Auto Neutrophil # : 10.34 K/uL  Auto Lymphocyte # : 1.07 K/uL  Auto Monocyte # : 1.52 K/uL  Auto Eosinophil # : 0.24 K/uL  Auto Basophil # : 0.04 K/uL  Auto Neutrophil % : 77.2 %  Auto Lymphocyte % : 8.0 %  Auto Monocyte % : 11.4 %  Auto Eosinophil % : 1.8 %  Auto Basophil % : 0.3 %    04-08    143  |  110<H>  |  27<H>  ----------------------------<  93  3.5   |  31  |  1.11    Ca    8.2<L>      08 Apr 2024 03:45  Phos  2.5     04-08  Mg     1.8     04-08    TPro  6.0  /  Alb  1.7<L>  /  TBili  0.6  /  DBili  x   /  AST  16  /  ALT  32  /  AlkPhos  126<H>  04-08    PT/INR - ( 06 Apr 2024 09:03 )   PT: 19.0 sec;   INR: 1.69 ratio         PTT - ( 06 Apr 2024 09:03 )  PTT:44.9 sec  Urinalysis Basic - ( 08 Apr 2024 03:45 )    Color: x / Appearance: x / SG: x / pH: x  Gluc: 93 mg/dL / Ketone: x  / Bili: x / Urobili: x   Blood: x / Protein: x / Nitrite: x   Leuk Esterase: x / RBC: x / WBC x   Sq Epi: x / Non Sq Epi: x / Bacteria: x      Culture Results:   >100,000 CFU/ml Escherichia coli (04-06 @ 09:04)  Culture Results:   No growth at 24 hours (04-06 @ 09:03)  Culture Results:   No growth at 24 hours (04-06 @ 09:03)      RADIOLOGY & ADDITIONAL STUDIES REVIEWED:  ***    [ ]GOALS OF CARE DISCUSSION WITH PATIENT/FAMILY/PROXY:    CRITICAL CARE TIME SPENT: 35 minutes

## 2024-04-08 NOTE — DIETITIAN INITIAL EVALUATION ADULT - OTHER INFO
10/27/23 (ED triage) noted as 5'7 wt 75.2 kg . Pt noted w/ AMS (acute encephalopathy), septic shock, UTI, AHRF. Pt seen, confused.

## 2024-04-08 NOTE — PROGRESS NOTE ADULT - ASSESSMENT
89 y/o female from [facility] AAOX 1-2 at baseline with pmhx of Parox Afib, arthritis, HTN, CVA, PFO, Brain Aneurysm, Lung Cancer diagnosed five years ago presenting to ED from nursing home for hypoxia,CO2 retention,respiratory failure and suspected sepsis.  1.Vent support as per ICU.  2.Gentle diuresis.  3.ABX.  4.PAF-lovenox.  5.PPI.

## 2024-04-08 NOTE — DIETITIAN INITIAL EVALUATION ADULT - ADD RECOMMEND
(no dietary order). s/p extubation. Please enter dietary order. If TF Consider start Jevity 1.5 10 ml/hr (X 18 hrs), increasing to an initial goal:50x18 :900 ml/ 1350 kcals, 57 gm protein. May add 1 Pkt Prosource/ day (+15 gm protein, 60 kcals). MD to monitor. RD available.

## 2024-04-08 NOTE — PROGRESS NOTE ADULT - ASSESSMENT
87 y/o female from [facility] with pmhx of Afib, arthritis, HTN, CVA, PFO presenting to ED from nursing home for hypoxia. In ED, pt was severely hypercarbic requiring emergent intubation. Pt admitted to ICU for acute hypercarbic respiratory failure and septic shock secondary to UTI.         _________CNS___________  #AMS  -in the setting of infection and hypercapnia   -CT head negative for acute findings   -pt intubated in ED  -Passed SAT but failed SBT 4/7      _________CVS___________  #Septic Shock   -UA+, Also concern for aspiration PNA  -Pt started on zosyn  -central line for pressor support  -ID for zosyn approval, Dr. Serra    #Afib  -lovenox 70mg BID   -Holding BB while BP low    #HTN  -hold BP meds     #elevated troponin  -TRop 98.5 -> 96.9   -EKG sinus tachycadia     #elevated BNP  -chest xray showing pul edema   -f/u echo results  _________RESP__________  #Acute Respiratory failure with hypercapnia  -Pt intubated in ED  -Repeat ABG showing improvement  -Pt failed SBT 4/7    ___________GI____________  #No acute issues     ________ RENAL__________  #No acute issues     __________MSK___________  #No acute issues       ___________ID____________  Septic shock UTI  -UCx growing E. Coli  -ABx as above  -f/u BCx  -f/u Sputum Cx    _________ENDO__________  #No acute issues       ______HEME/ONC_______  #Leukocytosis  -In s/o septic shock     _________SKIN____________      ________PROPHY_______  #DVT Lovenox FD   #GI PPI     ______GOC/DISPO___________  ICU  FULL CODE      87 y/o female from [facility] with pmhx of Afib, arthritis, HTN, CVA, PFO presenting to ED from nursing home for hypoxia. In ED, pt was severely hypercarbic requiring emergent intubation. Pt admitted to ICU for acute hypercarbic respiratory failure and septic shock secondary to UTI.         _________CNS___________  #AMS  -CT head negative for acute findings   -pt intubated in ED  -passed SAT in 4/8        _________CVS___________  #Septic Shock   -UA+, Also concern for aspiration PNA  -Pt started on zosyn  -off pressors, will start feeds which will help for BP     #Afib  -lovenox 70mg BID   -Holding BB while BP low    #HTN  -hold BP meds     #elevated troponin  -TRop 98.5 -> 96.9   -EKG sinus tachycadia     #elevated BNP  -chest xray showing pul edema   -f/u echo results    _________RESP__________  #Acute Respiratory failure with hypercapnia  -Pt intubated in ED  -Repeat ABG showing improvement  -Pt failed SBT 4/7  - trial on pressure support    ___________GI____________  #Nutrition  -placing a NG tube on 4/8    ________ RENAL__________  #No acute issues     __________MSK___________  #No acute issues       ___________ID____________  Septic shock UTI  -UCx growing E. Coli  -following cultures and sensitivities  - on zosyn Dr. Serra on case     _________ENDO__________  #No acute issues       ______HEME/ONC_______  #Leukocytosis  -In s/o septic shock  -trending down WBC count      _________SKIN____________      ________PROPHY_______  #DVT Lovenox FD   #GI PPI     ______GOC/DISPO___________  ICU  FULL CODE      89 y/o female from [facility] with pmhx of Afib, arthritis, HTN, CVA, PFO presenting to ED from nursing home for hypoxia. In ED, pt was severely hypercarbic requiring emergent intubation. Pt admitted to ICU for acute hypercarbic respiratory failure and septic shock secondary to UTI.         _________CNS___________  #AMS  -CT head negative for acute findings   -pt intubated in ED  -passed SAT in 4/8        _________CVS___________  #Septic Shock   -UA+, Also concern for aspiration PNA  -Pt started on zosyn  -off pressors, will start feeds which will help for BP     #Afib  -lovenox 70mg BID   -Holding BB while BP low    #HTN  -hold BP meds     #elevated troponin  -TRop 98.5 -> 96.9   -EKG sinus tachycadia     #elevated BNP  -chest xray showing pul edema   -f/u echo results    _________RESP__________  #Acute Respiratory failure with hypercapnia  -Pt intubated in ED  -Repeat ABG showing improvement  -Pt failed SBT 4/7  - patient self extubated on 4/8 and now monitoring on NC     ___________GI____________  #Nutrition  -doing a bedside dysphagia screen after self extubation    ________ RENAL__________  #No acute issues     __________MSK___________  #No acute issues       ___________ID____________  Septic shock UTI  -UCx growing E. Coli  -following cultures and sensitivities  - on zosyn Dr. Serra on case     _________ENDO__________  #No acute issues       ______HEME/ONC_______  #Leukocytosis  -In s/o septic shock  -trending down WBC count      _________SKIN____________      ________PROPHY_______  #DVT Lovenox FD   #GI PPI     ______GOC/DISPO___________  ICU  FULL CODE

## 2024-04-08 NOTE — DIETITIAN INITIAL EVALUATION ADULT - PERTINENT MEDS FT
MEDICATIONS  (STANDING):  albuterol/ipratropium for Nebulization 3 milliLiter(s) Nebulizer every 6 hours  artificial  tears Solution 1 Drop(s) Both EYES two times a day  chlorhexidine 2% Cloths 1 Application(s) Topical daily  enoxaparin Injectable 70 milliGRAM(s) SubCutaneous every 12 hours  latanoprost 0.005% Ophthalmic Solution 1 Drop(s) Both EYES at bedtime  pantoprazole  Injectable 40 milliGRAM(s) IV Push daily  piperacillin/tazobactam IVPB.. 3.375 Gram(s) IV Intermittent every 8 hours    MEDICATIONS  (PRN):  sodium chloride 0.9% lock flush 10 milliLiter(s) IV Push every 1 hour PRN Pre/post blood products, medications, blood draw, and to maintain line patency

## 2024-04-08 NOTE — PROGRESS NOTE ADULT - SUBJECTIVE AND OBJECTIVE BOX
Date of Service 04-08-24 @ 11:10    CHIEF COMPLAINT:Patient is a 88y old  Female who presents with a chief complaint of AMS, hypoxia.Pt remains intubated.    	  REVIEW OF SYSTEMS:  	  [x ] Unable to obtain    PHYSICAL EXAM:  T(C): 37.3 (04-08-24 @ 11:00), Max: 37.3 (04-07-24 @ 14:15)  HR: 95 (04-08-24 @ 11:00) (73 - 121)  BP: 116/65 (04-08-24 @ 11:00) (84/60 - 116/65)  RR: 19 (04-08-24 @ 11:00) (12 - 37)  SpO2: 98% (04-08-24 @ 11:00) (91% - 100%)  Wt(kg): --  I&O's Summary    07 Apr 2024 07:01  -  08 Apr 2024 07:00  --------------------------------------------------------  IN: 956.6 mL / OUT: 1380 mL / NET: -423.4 mL    08 Apr 2024 07:01  -  08 Apr 2024 11:10  --------------------------------------------------------  IN: 16.3 mL / OUT: 65 mL / NET: -48.7 mL        Appearance: Normal	  HEENT:   Normal oral mucosa, PERRL, EOMI	  Lymphatic: No lymphadenopathy  Cardiovascular: Normal S1 S2, No JVD, No murmurs, No edema  Respiratory: Dec BS at bases	  Gastrointestinal:  Soft, Non-tender, + BS	  Skin: No rashes, No ecchymoses, No cyanosis	  Extremities: Normal range of motion, No clubbing, cyanosis or edema  Vascular: Peripheral pulses palpable 2+ bilaterally    MEDICATIONS  (STANDING):  artificial  tears Solution 1 Drop(s) Both EYES two times a day  chlorhexidine 2% Cloths 1 Application(s) Topical daily  enoxaparin Injectable 70 milliGRAM(s) SubCutaneous every 12 hours  latanoprost 0.005% Ophthalmic Solution 1 Drop(s) Both EYES at bedtime  pantoprazole  Injectable 40 milliGRAM(s) IV Push daily  piperacillin/tazobactam IVPB.. 3.375 Gram(s) IV Intermittent every 8 hours  propofol Infusion 20 MICROgram(s)/kG/Min (9.34 mL/Hr) IV Continuous <Continuous>      	  	  LABS:	 	  Troponin I, High Sensitivity Result: 96.9 ng/L (04-06 @ 09:03)  Troponin I, High Sensitivity Result: 98.5 ng/L (04-06 @ 09:03)                            10.4   13.38 )-----------( 206      ( 08 Apr 2024 03:45 )             30.9     04-08    143  |  110<H>  |  27<H>  ----------------------------<  93  3.5   |  31  |  1.11    Ca    8.2<L>      08 Apr 2024 03:45  Phos  2.5     04-08  Mg     1.8     04-08    TPro  6.0  /  Alb  1.7<L>  /  TBili  0.6  /  DBili  x   /  AST  16  /  ALT  32  /  AlkPhos  126<H>  04-08    < from: Xray Chest 1 View- PORTABLE-Routine (Xray Chest 1 View- PORTABLE-Routine in AM.) (04.07.24 @ 09:46) >  ACC: [id number] EXAM:  XR CHEST PORTABLE ROUTINE 1V   ORDERED BY: RAGHAVENDRA OCHOA     PROCEDURE DATE:  04/07/2024          INTERPRETATION:  CLINICAL HISTORY / REASON FOR EXAM:  ETT    COMPARISON : Chest radiograph 4/6/2024.    TECHNIQUE/POSITIONING: Single frontal radiograph of the chest    FINDINGS:    Support Devices: Stable endotracheal tube terminating approximately 3.5   cm above the christina. Stable right IJ central venous catheter with tip   overlying the SVC.    Cardiac/Mediastinum/Hilum: Stable.    Lung Parenchyma/Pleura: Stable diffuse bilateral interstitial prominence,   likely due to emphysematous changes. Stable bibasilar   opacities/effusions. No pneumothorax.    Skeleton/Soft Tissues: Stable.    IMPRESSION:  Stable support devices as above.    Stable bibasilar opacities/effusions and emphysematous changes.    --- End of Report ---             GUY GLYNN MD; Attending Radiologist  This document has been electronically signed. Apr 7 2024  8:35PM    < end of copied text >          < from: TTE with Doppler (w/Cont) (Transthoracic Echocardiogram) (10.30.23 @ 07:22) >  OBSERVATIONS:  Mitral Valve: Normal mitral valve. Trace mitral  regurgitation.  Aortic Root: Aortic Root: 3.5 cm. Ascending aorta not well  seen.  Aortic Valve: Trileaflet aortic valve. No aortic stenosis.  Mild aortic regurgitation.  Left Atrium: Normal left atrium.  LA volume index = 21  cc/m2.  Left Ventricle: Normal Left Ventricular Systolic Function,  (EF = 62 % by biplane) No regional wall motion  abnormalities. Mildly increased left ventricular wall  thickness.  The diastolic function is indeterminate based  on current diagnostic criteria.  Right Heart: Right atrium not well visualized.  Right  ventricle not well visualized, grossly normal size and  function on limited views.Normal tricuspid valve. Mild  tricuspid regurgitation. Pulmonic valve not well seen.  Trace pulmonic insufficiency is noted.  Pericardium/PleuraTrivial pericardial effusion is seen.  Ovoid hypoechoic structure (4.4cm x 2.9cm), lateral to the  left atrium and inferolateral wall, with some mass effect.  Consider dedicated chest CT for further evaluation as  clincially indicated.  Hemodynamic: Unable to estimate RA pressure.  RVSP is  increased; Unable to accurately estimate RVSP. TR velocity  is 3.6m/s.  Agitated saline injection notable for probable  patent foramen ovale or atrial septal defect. Consider  additional dedicated imaging as clinically indicated.  ------------------------------------------------------------------------  CONCLUSIONS:  1. Normal mitral valve. Trace mitral regurgitation.  2. Trileaflet aortic valve. No aortic stenosis. Mild aortic  regurgitation.  3. Mildly increased left ventricular wall thickness.  4. Normal Left Ventricular Systolic Function,  (EF = 62 %  by biplane) No regional wall motion abnormalities.  5. The diastolic function is indeterminate based on current  diagnostic criteria.  6. Right ventricle not well visualized, grossly normal size  and function on limited views.  7. RVSP is increased; Unableto accurately estimate RVSP.  TR velocity is 3.6m/s.  8. Normal tricuspid valve. Mild tricuspid regurgitation.  9. Agitated saline injection notable for probable patent  foramen ovale or atrial septal defect. Consider additional  dedicated imaging asclinically indicated.  10. Trivial pericardial effusion is seen.  11. Ovoid hypoechoic structure (4.4cm x 2.9cm), lateral to  the left atrium and inferolateral wall, with some mass  effect. Consider dedicated chest CT for further evaluation  as clincially indicated.    *** No previous Echo exam.  ------------------------------------------------------------------------  Confirmed on  10/30/2023 - 11:49:57 by Shasha Cole MD  ------------------------------------------------------------------------    < end of copied text >

## 2024-04-09 NOTE — ADVANCED PRACTICE NURSE CONSULT - RECOMMEDATIONS
-Apply a Foam dressing to the Coccyx area Q 72hrs PRN  -Elevate/float the patients heels using heel protector and reposition the patient Q 2hrs using wedges or pillows

## 2024-04-09 NOTE — SWALLOW BEDSIDE ASSESSMENT ADULT - ORAL PREPARATORY PHASE
impaired labial seal, base of tongue pumping, poor lingual manipulation, bolus holding/Decreased mastication ability impaired labial seal, base of tongue pumping, poor lingual manipulation, bolus holding

## 2024-04-09 NOTE — PHYSICAL THERAPY INITIAL EVALUATION ADULT - ADDITIONAL COMMENTS
previously independent in mobility and function while using a rolling walker, prior to first occurrence of CVA ~ 6 months ago; recently receiving skilled rehabilitation services and requiring extensive assistance at subacute rehabilitation facility

## 2024-04-09 NOTE — PROGRESS NOTE ADULT - ASSESSMENT
87 y/o female from [facility] with pmhx of Afib, arthritis, HTN, CVA, PFO presenting to ED from nursing home for hypoxia. In ED, pt was severely hypercarbic requiring emergent intubation. Pt admitted to ICU for acute hypercarbic respiratory failure and septic shock secondary to UTI.         _________CNS___________  #AMS  -CT head negative for acute findings   -pt intubated in ED  -passed SAT in 4/8        _________CVS___________  #Septic Shock   -UA+, Also concern for aspiration PNA  -Pt started on zosyn      #Afib  -lovenox 70mg BID   -Holding BB while BP low  -s/p Lopressor 5 mg IV push  -started on Amiodarone     #HTN  -hold BP meds     #elevated troponin  -TRop 98.5 -> 96.9   -EKG sinus tachycadia     #elevated BNP  -chest xray showing pul edema   -f/u echo results:  mild (grade 1) left ventricular diastolic dysfunction EF 61%    _________RESP__________  #Acute Respiratory failure with hypercapnia  -Pt intubated in ED  -Repeat ABG showing improvement  -Pt failed SBT 4/7  - patient self extubated on 4/8 and now monitoring on NC     ___________GI____________  #Nutrition  -S/S rec puree diet    ________ RENAL__________  #No acute issues     __________MSK___________  #No acute issues       ___________ID____________  Septic shock UTI  -UCx growing E. Coli  -following cultures and sensitivities  - on zosyn Dr. Serra on case     _________ENDO__________  #No acute issues       ______HEME/ONC_______  #Leukocytosis  -In s/o septic shock  -trending down WBC count      _________SKIN____________      ________PROPHY_______  #DVT Lovenox FD   #GI PPI     ______GOC/DISPO___________  ICU  FULL CODE

## 2024-04-09 NOTE — PHYSICAL THERAPY INITIAL EVALUATION ADULT - LEVEL OF INDEPENDENCE: SIT/STAND, REHAB EVAL
able to initiate and clear buttocks off the bed, but unable to sustain and position self better into complete standing position/maximum assist (25% patients effort)/dependent (less than 25% patients effort)

## 2024-04-09 NOTE — PHYSICAL THERAPY INITIAL EVALUATION ADULT - MODALITIES TREATMENT COMMENTS
currently on O2@4L/min via NC after self-extubation; weak, ineffective cough; decreased chest mobility and expansion; poor neck control and poor neck and trunk posture; increased tendency to lean towards the right

## 2024-04-09 NOTE — PHYSICAL THERAPY INITIAL EVALUATION ADULT - PERTINENT HX OF CURRENT PROBLEM, REHAB EVAL
Admitted due to AMS, hypoxia; s/p intubation x 3 days, self/unplanned extubation  pmhx of Afib, arthritis, HTN, CVA, PFO, Brain Aneurysm, Lung Cancer diagnosed five years ago

## 2024-04-09 NOTE — ADVANCED PRACTICE NURSE CONSULT - ASSESSMENT
This is a 88yr old female patient admitted for Sepsis, presenting with the following:  -There is evidence of blanchable erythema to the Bilateral Heels  -There is a Stage 1 Pressure Injury to the Coccyx area, as evident by non-blanchable erythema

## 2024-04-09 NOTE — CHART NOTE - NSCHARTNOTEFT_GEN_A_CORE
[facility] ICUX IC01 W1  [facility], 88y, Female  [id number]    Patient seen and examined at bedside for removal of central line. Chart and labs reviewed prior to removal, no contraindication to procedure. Area cleaned with alcohol swabs and suture removal kit used to remove sutures holding central line in place. Patient then placed in reverse Trendelenburg position, asked to hold breath/hum, and then central line removed. Pressure applied for 5-10 minutes with gauze. No signs of active bleeding noted. No hematoma formation noted. Tegaderm/gauze/tape used to create pressure dressing to maintain pressure on central line site. Patient tolerated well without complications.   Discussed procedure with RN who will monitor and notify the provider for bleeding, hematoma formation, or other procedural complications.    Addis Marks MD  Department of Medicine  #

## 2024-04-09 NOTE — SWALLOW BEDSIDE ASSESSMENT ADULT - SWALLOW EVAL: DIAGNOSIS
Pt p/w oropharyngeal dysphagia c/b impaired labial seal, base of tongue pumping, poor lingual manipulation, bolus holding, decreased A-P transport, reduced hyolaryngeal elevation, and delayed swallow trigger. No overt s&s of penetration/aspiration was noted during this evaluation.

## 2024-04-09 NOTE — PROGRESS NOTE ADULT - SUBJECTIVE AND OBJECTIVE BOX
INTERVAL HPI/OVERNIGHT EVENTS: ***    PRESSORS: [ ] YES [ ] NO  WHICH:    MEDICATIONS:     Antimicrobial:  piperacillin/tazobactam IVPB.. 3.375 Gram(s) IV Intermittent every 8 hours    Cardiovascular:  aMIOdarone Infusion 0.5 mG/Min IV Continuous <Continuous>  aMIOdarone Infusion 1 mG/Min IV Continuous <Continuous>    Pulmonary:  albuterol/ipratropium for Nebulization 3 milliLiter(s) Nebulizer every 6 hours    Hematalogic:  enoxaparin Injectable 70 milliGRAM(s) SubCutaneous every 12 hours    Other:  artificial  tears Solution 1 Drop(s) Both EYES two times a day  chlorhexidine 2% Cloths 1 Application(s) Topical daily  latanoprost 0.005% Ophthalmic Solution 1 Drop(s) Both EYES at bedtime  mupirocin 2% Ointment 1 Application(s) Both Nostrils two times a day  pantoprazole  Injectable 40 milliGRAM(s) IV Push daily  sodium chloride 0.9% lock flush 10 milliLiter(s) IV Push every 1 hour PRN      Drug Dosing Weight  Height (cm): 170.1 (08 Apr 2024 16:09)  Weight (kg): 77.8 (06 Apr 2024 17:18)  BMI (kg/m2): 26.9 (08 Apr 2024 16:09)  BSA (m2): 1.89 (08 Apr 2024 16:09)    INTUBATED: [ ] YES [ ] NO   DATE:     CENTRAL LINE: [ ] YES [ ] NO  LOCATION:       CRUZ: [ ] YES [ ] NO        A-LINE:  [ ] YES [ ] NO  LOCATION:       ICU Vital Signs Last 24 Hrs  T(C): 35.8 (09 Apr 2024 08:00), Max: 36.9 (08 Apr 2024 16:00)  T(F): 96.5 (09 Apr 2024 08:00), Max: 98.4 (08 Apr 2024 16:00)  HR: 88 (09 Apr 2024 10:00) (82 - 209)  BP: 136/65 (09 Apr 2024 10:00) (98/80 - 136/65)  BP(mean): 85 (09 Apr 2024 10:00) (73 - 106)  ABP: --  ABP(mean): --  RR: 34 (09 Apr 2024 10:00) (18 - 47)  SpO2: 95% (09 Apr 2024 10:00) (92% - 100%)    O2 Parameters below as of 09 Apr 2024 10:00  Patient On (Oxygen Delivery Method): nasal cannula  O2 Flow (L/min): 4          ABG - ( 08 Apr 2024 03:03 )  pH, Arterial: 7.47  pH, Blood: x     /  pCO2: 46    /  pO2: 153   / HCO3: 34    / Base Excess: 8.6   /  SaO2: 100                   04-08 @ 07:01  -  04-09 @ 07:00  --------------------------------------------------------  IN: 518.2 mL / OUT: 820 mL / NET: -301.8 mL            REVIEW OF SYSTEMS:    CONSTITUTIONAL: No fever, chills, weight loss, or fatigue  RESPIRATORY: No cough, wheezing, or hemoptysis; No shortness of breath  CARDIOVASCULAR: No chest pain, palpitations, dizziness, or leg swelling  GASTROINTESTINAL: No abdominal pain. No nausea, vomiting, or hematemesis; No diarrhea or constipation. No melena or hematochezia.  GENITOURINARY: No dysuria, hematuria, or urinary frequency  NEUROLOGICAL: No headaches, memory loss, loss of strength, numbness, or tremors  MSK: No muscle or joint pain  SKIN: No itching, burning, rashes, or lesions      PHYSICAL EXAM:    GENERAL: NAD, well-groomed, well-developed  HEAD:  Atraumatic, Normocephalic  EYES: EOMI, PERRLA, conjunctiva and sclera clear  ENMT: No tonsillar erythema, exudates, or enlargement; Moist mucous membranes, Good dentition, No lesions  NECK: Supple, normal appearance, No JVD; Normal thyroid; Trachea midline  NERVOUS SYSTEM:  Alert & Oriented X3, Good concentration; Motor Strength 5/5 B/L upper and lower extremities; DTRs 2+ intact and symmetric  CHEST/LUNG: No chest deformity; Normal percussion bilaterally; No rales, rhonchi, wheezing   HEART: Regular rate and rhythm; Clear S1 and S2, No murmurs, rubs, or gallops  ABDOMEN: Soft, Nontender, Nondistended; Bowel sounds present  EXTREMITIES:  2+ Peripheral Pulses, No clubbing, cyanosis, or edema  LYMPH: No lymphadenopathy noted  SKIN: No rashes or lesions; Good capillary refill      LABS:  CBC Full  -  ( 09 Apr 2024 03:24 )  WBC Count : 10.95 K/uL  RBC Count : 4.48 M/uL  Hemoglobin : 10.8 g/dL  Hematocrit : 32.9 %  Platelet Count - Automated : 257 K/uL  Mean Cell Volume : 73.4 fl  Mean Cell Hemoglobin : 24.1 pg  Mean Cell Hemoglobin Concentration : 32.8 gm/dL  Auto Neutrophil # : 9.09 K/uL  Auto Lymphocyte # : 0.88 K/uL  Auto Monocyte # : 0.88 K/uL  Auto Eosinophil # : 0.11 K/uL  Auto Basophil # : 0.00 K/uL  Auto Neutrophil % : 83.0 %  Auto Lymphocyte % : 8.0 %  Auto Monocyte % : 8.0 %  Auto Eosinophil % : 1.0 %  Auto Basophil % : 0.0 %    04-09    145  |  110<H>  |  20<H>  ----------------------------<  123<H>  3.4<L>   |  31  |  0.94    Ca    9.0      09 Apr 2024 03:24  Phos  3.0     04-09  Mg     2.5     04-09    TPro  6.7  /  Alb  2.0<L>  /  TBili  1.0  /  DBili  x   /  AST  25  /  ALT  30  /  AlkPhos  160<H>  04-09      Urinalysis Basic - ( 09 Apr 2024 03:24 )    Color: x / Appearance: x / SG: x / pH: x  Gluc: 123 mg/dL / Ketone: x  / Bili: x / Urobili: x   Blood: x / Protein: x / Nitrite: x   Leuk Esterase: x / RBC: x / WBC x   Sq Epi: x / Non Sq Epi: x / Bacteria: x      Culture Results:   Normal Respiratory Nette present (04-07 @ 14:00)      RADIOLOGY & ADDITIONAL STUDIES REVIEWED:  ***    [ ]GOALS OF CARE DISCUSSION WITH PATIENT/FAMILY/PROXY:   INTERVAL HPI/OVERNIGHT EVENTS: Overnight, patient went into RVR/SVT. Given Lopressor 5 mg IV, started on Amiodarone. Self extubated yesterday.  PRESSORS: [ ] YES [x ] NO  WHICH:    MEDICATIONS:     Antimicrobial:  piperacillin/tazobactam IVPB.. 3.375 Gram(s) IV Intermittent every 8 hours    Cardiovascular:  aMIOdarone Infusion 0.5 mG/Min IV Continuous <Continuous>  aMIOdarone Infusion 1 mG/Min IV Continuous <Continuous>    Pulmonary:  albuterol/ipratropium for Nebulization 3 milliLiter(s) Nebulizer every 6 hours    Hematalogic:  enoxaparin Injectable 70 milliGRAM(s) SubCutaneous every 12 hours    Other:  artificial  tears Solution 1 Drop(s) Both EYES two times a day  chlorhexidine 2% Cloths 1 Application(s) Topical daily  latanoprost 0.005% Ophthalmic Solution 1 Drop(s) Both EYES at bedtime  mupirocin 2% Ointment 1 Application(s) Both Nostrils two times a day  pantoprazole  Injectable 40 milliGRAM(s) IV Push daily  sodium chloride 0.9% lock flush 10 milliLiter(s) IV Push every 1 hour PRN      Drug Dosing Weight  Height (cm): 170.1 (08 Apr 2024 16:09)  Weight (kg): 77.8 (06 Apr 2024 17:18)  BMI (kg/m2): 26.9 (08 Apr 2024 16:09)  BSA (m2): 1.89 (08 Apr 2024 16:09)    INTUBATED: [ ] YES [x ] NO   DATE:     CENTRAL LINE: [ ] YES [x ] NO  LOCATION:       CRUZ: [x ] YES [ ] NO        A-LINE:  [ ] YES [x ] NO  LOCATION:       ICU Vital Signs Last 24 Hrs  T(C): 35.8 (09 Apr 2024 08:00), Max: 36.9 (08 Apr 2024 16:00)  T(F): 96.5 (09 Apr 2024 08:00), Max: 98.4 (08 Apr 2024 16:00)  HR: 88 (09 Apr 2024 10:00) (82 - 209)  BP: 136/65 (09 Apr 2024 10:00) (98/80 - 136/65)  BP(mean): 85 (09 Apr 2024 10:00) (73 - 106)  ABP: --  ABP(mean): --  RR: 34 (09 Apr 2024 10:00) (18 - 47)  SpO2: 95% (09 Apr 2024 10:00) (92% - 100%)    O2 Parameters below as of 09 Apr 2024 10:00  Patient On (Oxygen Delivery Method): nasal cannula  O2 Flow (L/min): 4          ABG - ( 08 Apr 2024 03:03 )  pH, Arterial: 7.47  pH, Blood: x     /  pCO2: 46    /  pO2: 153   / HCO3: 34    / Base Excess: 8.6   /  SaO2: 100                   04-08 @ 07:01  -  04-09 @ 07:00  --------------------------------------------------------  IN: 518.2 mL / OUT: 820 mL / NET: -301.8 mL          PHYSICAL EXAM:  GENERAL: off sedation is responsive to stimuli   HEAD:  Atraumatic, Normocephalic  EYES: EOMI, anicteric   ENMT: MMM; No tonsillar erythema or exudates  NECK: Supple, normal appearance, No JVD; Normal thyroid; Trachea midline  NERVOUS SYSTEM:  alert to stimuli, +gag reflex  CHEST/LUNG: No chest deformity; No rales or wheezing   HEART: Regular rate and rhythm; No abnormal heart sounds  ABDOMEN: Soft, Nontender, Nondistended; Bowel sounds present  EXTREMITIES:  2+ Peripheral Pulses, No appreciable edema  LYMPH: No lymphadenopathy noted  SKIN: No rashes or lesions;  Good capillary refill      LABS:  CBC Full  -  ( 09 Apr 2024 03:24 )  WBC Count : 10.95 K/uL  RBC Count : 4.48 M/uL  Hemoglobin : 10.8 g/dL  Hematocrit : 32.9 %  Platelet Count - Automated : 257 K/uL  Mean Cell Volume : 73.4 fl  Mean Cell Hemoglobin : 24.1 pg  Mean Cell Hemoglobin Concentration : 32.8 gm/dL  Auto Neutrophil # : 9.09 K/uL  Auto Lymphocyte # : 0.88 K/uL  Auto Monocyte # : 0.88 K/uL  Auto Eosinophil # : 0.11 K/uL  Auto Basophil # : 0.00 K/uL  Auto Neutrophil % : 83.0 %  Auto Lymphocyte % : 8.0 %  Auto Monocyte % : 8.0 %  Auto Eosinophil % : 1.0 %  Auto Basophil % : 0.0 %    04-09    145  |  110<H>  |  20<H>  ----------------------------<  123<H>  3.4<L>   |  31  |  0.94    Ca    9.0      09 Apr 2024 03:24  Phos  3.0     04-09  Mg     2.5     04-09    TPro  6.7  /  Alb  2.0<L>  /  TBili  1.0  /  DBili  x   /  AST  25  /  ALT  30  /  AlkPhos  160<H>  04-09      Urinalysis Basic - ( 09 Apr 2024 03:24 )    Color: x / Appearance: x / SG: x / pH: x  Gluc: 123 mg/dL / Ketone: x  / Bili: x / Urobili: x   Blood: x / Protein: x / Nitrite: x   Leuk Esterase: x / RBC: x / WBC x   Sq Epi: x / Non Sq Epi: x / Bacteria: x      Culture Results:   Normal Respiratory Nette present (04-07 @ 14:00)      RADIOLOGY & ADDITIONAL STUDIES REVIEWED:  ***    [ ]GOALS OF CARE DISCUSSION WITH PATIENT/FAMILY/PROXY:

## 2024-04-09 NOTE — PHYSICAL THERAPY INITIAL EVALUATION ADULT - PASSIVE RANGE OF MOTION EXAMINATION, REHAB EVAL
both shoulders limited to 120 degrees of flexion and abduction/bilateral upper extremity Passive ROM was WFL (within functional limits)/bilateral lower extremity Passive ROM was WFL (within functional limits)

## 2024-04-09 NOTE — CHART NOTE - NSCHARTNOTEFT_GEN_A_CORE
87 y/o female from [facility] AAOX 1-2 at baseline with pmhx of Afib (on Eliquis), arthritis, HTN, CVA, PFO, Brain Aneurysm, Lung Cancer diagnosed five years ago who presented to ED from nursing home for hypoxia and AMS. In ED VS: T 97.6, , BP 99/52, SPo2 99% on 8LNRB. Given 20mg IV lasix, 1mg ativan, Zosyn/ Vanc, 2LNS.   Patient became hypotensive and was started on peripheral Levophed, which has since been weaned off. Patient was severely hypercarbic requiring emergent intubation. Pt admitted to ICU for acute hypercarbic respiratory failure and septic shock secondary to UTI and concern of aspiration PNA. Started on Zosyn. Urine culture growing E. coli. CT head negative for acute findings. Self extubated and passed SAT on 4/8, now breathing on 4 L NC. Course complicated by Afib w/ RVR with runs of NSVT on 4/9, started on Amiodarone drip, now rate controlled NSR. Holding home lopressor 25mg BID for hypotension, now off pressors.  Speech and Swallow evaluated patient and recommended pureed diet with thin liquids as she has history of esophageal protrusion on previous barium swallow study.     To do:  C/w Amiodarone loading and drip, transition to PO   C/w Airway C  monitor on tele  f/u Urine Cx and sensitivities     Signed out to:  Attending: Dr. Nation  ACP: LOUISA Myers 89 y/o female from [facility] AAOX 1-2 at baseline with pmhx of Afib (on Eliquis), arthritis, HTN, CVA, PFO, Brain Aneurysm, Lung Cancer diagnosed five years ago who presented to ED from nursing home for hypoxia and AMS. In ED VS: T 97.6, , BP 99/52, SPo2 99% on 8LNRB. Given 20mg IV lasix, 1mg ativan, Zosyn/ Vanc, 2LNS.   Patient became hypotensive and was started on peripheral Levophed, which has since been weaned off. Patient was severely hypercarbic requiring emergent intubation. Pt admitted to ICU for acute hypercarbic respiratory failure and septic shock secondary to UTI and concern of aspiration PNA. Started on Zosyn. Urine culture growing E. coli. CT head negative for acute findings. Self extubated and passed SAT on 4/8, now breathing on 4 L NC. Course complicated by Afib w/ RVR with runs of NSVT on 4/9, started on Amiodarone drip, now rate controlled NSR. Holding home lopressor 25mg BID for hypotension, now off pressors.  Speech and Swallow evaluated patient and recommended pureed diet with thin liquids as she has history of esophageal protrusion on previous barium swallow study.     To do:  C/w Amiodarone loading and drip, transition to PO   C/w Airway C  monitor on tele  f/u Urine Cx and sensitivities   TOV in a.m.    Signed out to:  Attending: Dr. Nation  ACP: LOUISA Myers

## 2024-04-09 NOTE — PHYSICAL THERAPY INITIAL EVALUATION ADULT - LIVES WITH, PROFILE
previously lived at [facility]; recently receiving skilled rehabilitation services at a subacute rehabilitation facility

## 2024-04-09 NOTE — PHYSICAL THERAPY INITIAL EVALUATION ADULT - DIAGNOSIS, PT EVAL
impaired pulmonary status s/p intubation x 3 days; self-extubated; generalized weakness with h/o right sided weakness from h/o CVA; impaired sitting balance; difficulty performing bed mobility and supine-sit tasks; difficulty attempting sit-stand tasks

## 2024-04-09 NOTE — SWALLOW BEDSIDE ASSESSMENT ADULT - SLP PERTINENT HISTORY OF CURRENT PROBLEM
Pulmonary edema on CXR  Orthopnea resolved  Diuresing with IV Lasix   Net -1.6 L   TTE pending    As per chart review, pt is 87 y/o female from [facility] with pmhx of Afib, arthritis, HTN, CVA, PFO, Brain Aneurysm, Lung Cancer diagnosed five years ago presenting to ED from nursing home for hypoxia. Patient unable to provide any further history. EMS noted patient to be hypoxic in the field. Her mother recently signed a health care proxy form making her DNR/DNI.

## 2024-04-09 NOTE — CHART NOTE - NSCHARTNOTEFT_GEN_A_CORE
Informed pt's niece Sonya Ritchie ([phone]) that pt is moved out of ICU and to 510a. All questions answered.

## 2024-04-09 NOTE — PHYSICAL THERAPY INITIAL EVALUATION ADULT - ACTIVE RANGE OF MOTION EXAMINATION, REHAB EVAL
right upper and lower extremity AROM with slight limitation due to weakness/Left UE Active ROM was WFL (within functional limits)/Left LE Active ROM was WFL (within functional limits)

## 2024-04-09 NOTE — PHYSICAL THERAPY INITIAL EVALUATION ADULT - PATIENT/FAMILY/SIGNIFICANT OTHER GOALS STATEMENT, PT EVAL
patient was receiving skilled rehabilitation services at [facility] prior to admission; she was ambulatory and independent prior to CVA > 6 mos. ago; current attainable goal is to relearn the ability to perform stand-pivot transfers and short-distance ambulation while using an assistive device

## 2024-04-09 NOTE — PROGRESS NOTE ADULT - ASSESSMENT
89 y/o female from [facility] AAOX 1-2 at baseline with pmhx of Parox Afib, arthritis, HTN, CVA, PFO, Brain Aneurysm, Lung Cancer diagnosed five years ago presenting to ED from nursing home for hypoxia,CO2 retention,respiratory failure and suspected sepsis.  1.ICU monitoring..  2.Gentle diuresis.  3.ABX.  4.PAF-lovenox,on amio.  5.Speech and swallow eval.  6.PPI.

## 2024-04-09 NOTE — PROGRESS NOTE ADULT - ATTENDING COMMENTS
89 y/o female from [facility] with pmhx of Afib, arthritis, HTN, CVA, PFO presenting to ED from nursing home for hypoxia admitted to ICU for septic shock secondary to UTI. Intubated for acute hypercapnic respiratory failure.     Assessment:  1. Acute encephalopathy  2. Septic shock  3, UTI  4. Chronic afib w/ RVR  5. Acute hypercapnic respiratory failure     Plan:  - s/p self extub 4/8; cont to monitor respiratory status   - cont broad spectrum antibiotics  - amio load and gtt overnight for RVR, continue loading  - Follow up cultures  - Hold all antihypertensives for now  - Off vasopressors   - Pureed diet  - Cont, AC for now  - d/c lines  - Prognosis is guarded  - DNR/DNI code status    Transfer to cardiac telemetry
89 y/o female from [facility] with pmhx of Afib, arthritis, HTN, CVA, PFO presenting to ED from nursing home for hypoxia admitted to ICU for septic shock secondary to UTI. Intubated for acute hypercapnic respiratory failure.     Assessment:  1. Acute encephalopathy  2. Septic shock  3, UTI  4. Chronic afib   5. Acute hypercapnic respiratory failure     Plan:  - Cont. mechanical ventilation  - ABG improving   - Awakening trial   - Daily weaning trial  - Broad spectrum antibiotics  - Follow up culture results  - Hold all antihypertensives for now  - Vasopressor support to maintain MAP > 65   - Start tube feedings   - Cont, AC for now  - Gentle hydration   - Central line placed   - Prognosis is guarded  - DNR code status
87 y/o female from [facility] with pmhx of Afib, arthritis, HTN, CVA, PFO presenting to ED from nursing home for hypoxia admitted to ICU for septic shock secondary to UTI. Intubated for acute hypercapnic respiratory failure.     Assessment:  1. Acute encephalopathy  2. Septic shock  3, UTI  4. Chronic afib   5. Acute hypercapnic respiratory failure     Plan:  - Self extubated this morning   - Monitor respiratory status   - Broad spectrum antibiotics  - Follow up culture results  - Hold all antihypertensives for now  - Off vasopressors   - Start tube feedings   - Cont, AC for now  - Central line placed   - Prognosis is guarded  - DNR/DNI code status

## 2024-04-09 NOTE — PHYSICAL THERAPY INITIAL EVALUATION ADULT - MANUAL MUSCLE TESTING RESULTS, REHAB EVAL
MMT on right upper and lower extremities are grossly graded 2+/5; left upper and lower extremities are grossly graded 3+/5

## 2024-04-09 NOTE — PROGRESS NOTE ADULT - SUBJECTIVE AND OBJECTIVE BOX
Date of Service 04-09-24 @ 12:48    CHIEF COMPLAINT:Patient is a 88y old  Female who presents with a chief complaint of AMS, hypoxia .Pt s/p extubation.    	  REVIEW OF SYSTEMS:  CONSTITUTIONAL: No fever, weight loss, or fatigue  EYES: No eye pain, visual disturbances, or discharge  ENT:  No difficulty hearing, tinnitus, vertigo; No sinus or throat pain  NECK: No pain or stiffness  RESPIRATORY: No cough, wheezing, chills or hemoptysis; No Shortness of Breath  CARDIOVASCULAR: No chest pain, palpitations, passing out, dizziness, or leg swelling  GASTROINTESTINAL: No abdominal or epigastric pain. No nausea, vomiting, or hematemesis; No diarrhea or constipation. No melena or hematochezia.  GENITOURINARY: No dysuria, frequency, hematuria, or incontinence  NEUROLOGICAL: No headaches, memory loss, loss of strength, numbness, or tremors  SKIN: No itching, burning, rashes, or lesions   LYMPH Nodes: No enlarged glands  ENDOCRINE: No heat or cold intolerance; No hair loss  MUSCULOSKELETAL: No joint pain or swelling; No muscle, back, or extremity pain  PSYCHIATRIC: No depression, anxiety, mood swings, or difficulty sleeping  HEME/LYMPH: No easy bruising, or bleeding gums  ALLERGY AND IMMUNOLOGIC: No hives or eczema	      PHYSICAL EXAM:  T(C): 35.8 (04-09-24 @ 08:00), Max: 36.9 (04-08-24 @ 16:00)  HR: 83 (04-09-24 @ 12:22) (82 - 209)  BP: 105/84 (04-09-24 @ 12:22) (98/80 - 137/76)  RR: 30 (04-09-24 @ 12:22) (18 - 47)  SpO2: 100% (04-09-24 @ 12:22) (92% - 100%)  Wt(kg): --  I&O's Summary    08 Apr 2024 07:01  -  09 Apr 2024 07:00  --------------------------------------------------------  IN: 518.2 mL / OUT: 820 mL / NET: -301.8 mL    09 Apr 2024 07:01  -  09 Apr 2024 12:48  --------------------------------------------------------  IN: 116.7 mL / OUT: 40 mL / NET: 76.7 mL        Appearance: Normal	  HEENT:   Normal oral mucosa, PERRL, EOMI	  Lymphatic: No lymphadenopathy  Cardiovascular: Normal S1 S2, No JVD, No murmurs, No edema  Respiratory: Dec BS at bases	  Psychiatry: A & O x 3, Mood & affect appropriate  Gastrointestinal:  Soft, Non-tender, + BS	  Skin: No rashes, No ecchymoses, No cyanosis	  Neurologic: Non-focal  Extremities: Normal range of motion, No clubbing, cyanosis or edema  Vascular: Peripheral pulses palpable 2+ bilaterally    MEDICATIONS  (STANDING):  albuterol/ipratropium for Nebulization 3 milliLiter(s) Nebulizer every 6 hours  aMIOdarone Infusion 1 mG/Min (33.3 mL/Hr) IV Continuous <Continuous>  aMIOdarone Infusion 0.5 mG/Min (16.7 mL/Hr) IV Continuous <Continuous>  artificial  tears Solution 1 Drop(s) Both EYES two times a day  chlorhexidine 2% Cloths 1 Application(s) Topical daily  enoxaparin Injectable 70 milliGRAM(s) SubCutaneous every 12 hours  latanoprost 0.005% Ophthalmic Solution 1 Drop(s) Both EYES at bedtime  mupirocin 2% Ointment 1 Application(s) Both Nostrils two times a day  pantoprazole  Injectable 40 milliGRAM(s) IV Push daily  piperacillin/tazobactam IVPB.. 3.375 Gram(s) IV Intermittent every 8 hours       	  	  LABS:	 	    Troponin I, High Sensitivity Result: 22.6 ng/L (04-09 @ 03:24)                            10.8   10.95 )-----------( 257      ( 09 Apr 2024 03:24 )             32.9     04-09    145  |  110<H>  |  20<H>  ----------------------------<  123<H>  3.4<L>   |  31  |  0.94    Ca    9.0      09 Apr 2024 03:24  Phos  3.0     04-09  Mg     2.5     04-09    TPro  6.7  /  Alb  2.0<L>  /  TBili  1.0  /  DBili  x   /  AST  25  /  ALT  30  /  AlkPhos  160<H>  04-09

## 2024-04-10 NOTE — PROGRESS NOTE ADULT - SUBJECTIVE AND OBJECTIVE BOX
Date of Service 04-10-24 @ 10:11    CHIEF COMPLAINT:Patient is a 88y old  Female who presents with a chief complaint of AMS, hypoxia .Pt appears comfortable.    	  REVIEW OF SYSTEMS:  CONSTITUTIONAL: No fever, weight loss, or fatigue  EYES: No eye pain, visual disturbances, or discharge  ENT:  No difficulty hearing, tinnitus, vertigo; No sinus or throat pain  NECK: No pain or stiffness  RESPIRATORY: No cough, wheezing, chills or hemoptysis; + Shortness of Breath  CARDIOVASCULAR: No chest pain, palpitations, passing out, dizziness, or leg swelling  GASTROINTESTINAL: No abdominal or epigastric pain. No nausea, vomiting, or hematemesis; No diarrhea or constipation. No melena or hematochezia.  GENITOURINARY: No dysuria, frequency, hematuria, or incontinence  NEUROLOGICAL: No headaches, memory loss, loss of strength, numbness, or tremors  SKIN: No itching, burning, rashes, or lesions   LYMPH Nodes: No enlarged glands  ENDOCRINE: No heat or cold intolerance; No hair loss  MUSCULOSKELETAL: No joint pain or swelling; No muscle, back, or extremity pain  PSYCHIATRIC: No depression, anxiety, mood swings, or difficulty sleeping  HEME/LYMPH: No easy bruising, or bleeding gums  ALLERGY AND IMMUNOLOGIC: No hives or eczema	        PHYSICAL EXAM:  T(C): 36.8 (04-10-24 @ 07:53), Max: 37.1 (04-09-24 @ 15:00)  HR: 102 (04-10-24 @ 07:53) (78 - 106)  BP: 162/91 (04-10-24 @ 07:53) (105/84 - 162/91)  RR: 18 (04-10-24 @ 07:53) (18 - 43)  SpO2: 94% (04-10-24 @ 07:53) (94% - 100%)  Wt(kg): --  I&O's Summary    09 Apr 2024 07:01  -  10 Apr 2024 07:00  --------------------------------------------------------  IN: 533.7 mL / OUT: 865 mL / NET: -331.3 mL        Appearance: Normal	  HEENT:   Normal oral mucosa, PERRL, EOMI	  Lymphatic: No lymphadenopathy  Cardiovascular: Normal S1 S2, No JVD, No murmurs, No edema  Respiratory: Dec BS at bases	  Psychiatry: A & O x 3, Mood & affect appropriate  Gastrointestinal:  Soft, Non-tender, + BS	  Skin: No rashes, No ecchymoses, No cyanosis	  Neurologic: Non-focal  Extremities: Normal range of motion, No clubbing, cyanosis or edema  Vascular: Peripheral pulses palpable 2+ bilaterally    MEDICATIONS  (STANDING):  albuterol/ipratropium for Nebulization 3 milliLiter(s) Nebulizer every 6 hours  aMIOdarone    Tablet 200 milliGRAM(s) Oral daily  aMIOdarone Infusion 0.5 mG/Min (16.7 mL/Hr) IV Continuous <Continuous>  apixaban 5 milliGRAM(s) Oral two times a day  artificial  tears Solution 1 Drop(s) Both EYES two times a day  furosemide   Injectable 20 milliGRAM(s) IV Push once  latanoprost 0.005% Ophthalmic Solution 1 Drop(s) Both EYES at bedtime  mupirocin 2% Ointment 1 Application(s) Both Nostrils two times a day  pantoprazole  Injectable 40 milliGRAM(s) IV Push daily  piperacillin/tazobactam IVPB.. 3.375 Gram(s) IV Intermittent every 8 hours      TELEMETRY: nsr	    	  	  LABS:	 	          Troponin I, High Sensitivity Result: 22.6 ng/L (04-09 @ 03:24)                            11.9   16.15 )-----------( 317      ( 10 Apr 2024 07:23 )             35.9     04-10    145  |  109<H>  |  14  ----------------------------<  128<H>  3.9   |  29  |  0.86    Ca    8.8      10 Apr 2024 07:23  Phos  3.0     04-10  Mg     2.4     04-10    TPro  6.7  /  Alb  2.0<L>  /  TBili  1.0  /  DBili  x   /  AST  25  /  ALT  30  /  AlkPhos  160<H>  04-09    Culture - Urine (04.06.24 @ 09:04)   - Amoxicillin/Clavulanic Acid: S <=8/4 Consider reserving for cystitis when ampicillin/sulbactam is resistant  - Ampicillin: R >16 These ampicillin results predict results for amoxicillin  - Ampicillin/Sulbactam: R >16/8  - Aztreonam: S <=4  - Cefazolin: S 4 For uncomplicated UTI with K. pneumoniae, E. coli, or P. mirablis: EMILY <=16 is sensitive and EMILY >=32 is resistant. This also predicts results for oral agents cefaclor, cefdinir, cefpodoxime, cefprozil, cefuroxime axetil, cephalexin and locarbef for uncomplicated UTI. Note that some isolates may be susceptible to these agents while testing resistant to cefazolin.  - Cefepime: S <=2  - Cefoxitin: S <=8  - Ceftriaxone: S <=1  - Cefuroxime: S <=4  - Ciprofloxacin: R >2  - Ertapenem: S <=0.5  - Gentamicin: R >8  - Imipenem: S <=1  - Levofloxacin: R >4  - Meropenem: S <=1  - Nitrofurantoin: S <=32 Should not be used to treat pyelonephritis  - Piperacillin/Tazobactam: S <=8  - Tobramycin: R 8  - Trimethoprim/Sulfamethoxazole: S <=0.5/9.5  Specimen Source: Clean Catch Clean Catch (Midstream)  Culture Results:   >100,000 CFU/ml Escherichia coli  Organism Identification: Escherichia coli  Organism: Escherichia coli  Method Type: EMILY

## 2024-04-10 NOTE — PROGRESS NOTE ADULT - ASSESSMENT
89 y/o female from [facility] AAOX 1-2 at baseline with pmhx of Afib (on Eliquis), arthritis, HTN, CVA, PFO, Brain Aneurysm, Lung Cancer diagnosed five years ago who presented to ED from nursing home for hypoxia and AMS. In ED VS: T 97.6, , BP 99/52, SPo2 99% on 8LNRB. Given 20mg IV lasix, 1mg ativan, Zosyn/ Vanc, 2LNS.   Patient became hypotensive and was started on peripheral Levophed, which has since been weaned off. Patient was severely hypercarbic requiring emergent intubation. Pt admitted to ICU for acute hypercarbic respiratory failure and septic shock secondary to UTI and concern of aspiration PNA. Started on Zosyn. Urine culture growing E. coli. CT head negative for acute findings. Self extubated and passed SAT on 4/8, now breathing on 4 L NC. Course complicated by Afib w/ RVR with runs of NSVT on 4/9, started on Amiodarone drip, now rate controlled NSR. Holding home lopressor 25mg BID for hypotension, now off pressors.  Speech and Swallow evaluated patient and recommended pureed diet with thin liquids as she has history of esophageal protrusion on previous barium swallow study.

## 2024-04-10 NOTE — CONSULT NOTE ADULT - ASSESSMENT
87 y/o female from [facility] AAOX 1-2 at baseline with pmhx of Parox Afib, arthritis, HTN, CVA, PFO, Brain Aneurysm, Lung Cancer diagnosed five years ago presenting to ED from nursing home for hypoxia,CO2 retention,respiratory failure and suspected sepsis.  1.Vent and pressor support as per ICU.  2.Gentle diuresis.  3.ABX.  4.PAF-lovenox.  5.PPI.
  # Severe Sepsis/Septic shock On admission ( fever + tachycardia + tachypnea +Hypoxia + Leukocytosis )  # S/P Hypercarbic respiratory failure - s/p Self extubation  # UTI - Ucx grew E.coli  # Aspiration pneumonia - MSSA PCR is positive    would recommend:    1. Please change Zosyn to Cefazolin since MSSA PCR is positive  2. TOV  3. Monitor WBC count  4. Supplemental oxygenation and Bronchodilator as needed  5. Aspiration precaution  6. Pain management as needed    d/w DR. Nation and Family at the bed side     will follow the patient with you and make further recommendation based on the clinical course and Lab results  Thank you for the opportunity to participate in Ms. AGUILERA's care    Attending Attestation:    Spent more than 65 minutes on total encounter, more than 50 % of the visit was spent counseling and/or coordinating care by the Attending physician.

## 2024-04-10 NOTE — PROGRESS NOTE ADULT - ASSESSMENT
87 y/o female from [facility] AAOX 1-2 at baseline with pmhx of Parox Afib, arthritis, HTN, CVA, PFO, Brain Aneurysm, Lung Cancer diagnosed five years ago presenting to ED from nursing home for hypoxia,CO2 retention,respiratory failure and suspected sepsis.  1.Tele monitoring..  2.Gentle diuresis.  3.ABX for uti and possible pneumonia..  4.PAF-lovenox can be changed to eliquis,,on amio.Resume b blocker.  5.ID eval .  6.PPI.  7.Pt is DNR/DNI.  8.PT eval.

## 2024-04-10 NOTE — CONSULT NOTE ADULT - SUBJECTIVE AND OBJECTIVE BOX
Patient is a 88y old  Female who presents with a chief complaint of AMS, hypoxia (10 Apr 2024 10:10)        REVIEW OF SYSTEMS: Total of twelve systems have been reviewed with patient and found to be negative unless mentioned in HPI        PAST MEDICAL & SURGICAL HISTORY:  Hypertension  Hyperlipidemia  Glaucoma  Other nonspecific abnormal finding of lung field  CVA (cerebrovascular accident)  H/O: glaucoma  left        SOCIAL HISTORY  Alcohol: Does not drink  Tobacco: Does not smoke  Illicit substance use: None      FAMILY HISTORY: Non contributory to the present illness        ALLERGIES: No Known Allergies        Vital Signs Last 24 Hrs  T(C): 37 (10 Apr 2024 11:39), Max: 37 (10 Apr 2024 11:39)  T(F): 98.6 (10 Apr 2024 11:39), Max: 98.6 (10 Apr 2024 11:39)  HR: 98 (10 Apr 2024 11:39) (80 - 106)  BP: 166/93 (10 Apr 2024 11:39) (131/78 - 166/93)  BP(mean): 107 (09 Apr 2024 20:00) (91 - 107)  RR: 20 (10 Apr 2024 11:39) (18 - 41)  SpO2: 94% (10 Apr 2024 11:39) (94% - 100%)    Parameters below as of 10 Apr 2024 11:39  Patient On (Oxygen Delivery Method): nasal cannula  O2 Flow (L/min): 2        PHYSICAL EXAM:  GENERAL: Not in distress   CHEST/LUNG:  Aire ntry bilaterally  HEART: s1 and s2 present  ABDOMEN:  Nontender and  Nondistended  EXTREMITIES: No pedal  edema  CNS: Awake and Alert      LABS:                        11.9   16.15 )-----------( 317      ( 10 Apr 2024 07:23 )             35.9       04-10    145  |  109<H>  |  14  ----------------------------<  128<H>  3.9   |  29  |  0.86    Ca    8.8      10 Apr 2024 07:23  Phos  3.0     04-10  Mg     2.4     04-10    TPro  6.7  /  Alb  2.0<L>  /  TBili  1.0  /  DBili  x   /  AST  25  /  ALT  30  /  AlkPhos  160<H>  04-09      CAPILLARY BLOOD GLUCOSE  POCT Blood Glucose.: 126 mg/dL (09 Apr 2024 17:37)        MEDICATIONS  (STANDING):  albuterol/ipratropium for Nebulization 3 milliLiter(s) Nebulizer every 6 hours  aMIOdarone    Tablet 200 milliGRAM(s) Oral daily  aMIOdarone Infusion 0.5 mG/Min (16.7 mL/Hr) IV Continuous <Continuous>  apixaban 5 milliGRAM(s) Oral two times a day  artificial  tears Solution 1 Drop(s) Both EYES two times a day  latanoprost 0.005% Ophthalmic Solution 1 Drop(s) Both EYES at bedtime  metoprolol tartrate 25 milliGRAM(s) Oral two times a day  mupirocin 2% Ointment 1 Application(s) Both Nostrils two times a day  pantoprazole    Tablet 40 milliGRAM(s) Oral before breakfast  piperacillin/tazobactam IVPB.. 3.375 Gram(s) IV Intermittent every 8 hours    MEDICATIONS  (PRN):  sodium chloride 0.9% lock flush 10 milliLiter(s) IV Push every 1 hour PRN Pre/post blood products, medications, blood draw, and to maintain line patency          RADIOLOGY & ADDITIONAL TESTS:               Patient is a 88y old  Female who is from [facility] AAOX 1-2 at baseline with pmhx of Afib (on Eliquis), arthritis, HTN, CVA, PFO, Brain Aneurysm, Lung Cancer diagnosed five years ago, sent in to the ER on 4/6/24 for evaluation of hypoxia and AMS. On admission, she found ot have fever, tachycardia and Given 20mg IV lasix, 1mg ativan, Zosyn/ Vanc, 2LNS.   Patient became hypotensive required pressor, which has been weaned off, but Patient became severely hypercarbic requiring emergent intubation and admitted to ICU for acute hypercarbic respiratory failure and septic shock secondary to UTI and concern of aspiration PNA. Started on Zosyn. Urine culture growing E. coli. CT head negative for acute findings. Self extubated and passed SAT on 4/8, now breathing on 4 L NC. Course complicated by Afib w/ RVR with runs of NSVT on 4/9, started on Amiodarone drip, now rate controlled NSR. Holding home lopressor 25mg BID for hypotension, now off pressors.  Speech and Swallow evaluated patient and recommended pureed diet with thin liquids as she has history of esophageal protrusion on previous barium swallow study. Now The ID consult requested to assist with further antibiotic management.       REVIEW OF SYSTEMS: Unable to obtain due to mental status unless mentioned in HPI        PAST MEDICAL & SURGICAL HISTORY:  Hypertension  Hyperlipidemia  Glaucoma  Other nonspecific abnormal finding of lung field  CVA (cerebrovascular accident)  H/O: glaucoma  left      SOCIAL HISTORY  Alcohol: Does not drink  Tobacco: Does not smoke  Illicit substance use: None      FAMILY HISTORY: Non contributory to the present illness      ALLERGIES: No Known Allergies      Vital Signs Last 24 Hrs  T(C): 37 (10 Apr 2024 11:39), Max: 37 (10 Apr 2024 11:39)  T(F): 98.6 (10 Apr 2024 11:39), Max: 98.6 (10 Apr 2024 11:39)  HR: 98 (10 Apr 2024 11:39) (80 - 106)  BP: 166/93 (10 Apr 2024 11:39) (131/78 - 166/93)  BP(mean): 107 (09 Apr 2024 20:00) (91 - 107)  RR: 20 (10 Apr 2024 11:39) (18 - 41)  SpO2: 94% (10 Apr 2024 11:39) (94% - 100%)    Parameters below as of 10 Apr 2024 11:39  Patient On (Oxygen Delivery Method): nasal cannula  O2 Flow (L/min): 2      PHYSICAL EXAM:  GENERAL: Appears uncomfortable, on oxygen via NC  CHEST/LUNG:  Not using accessory muscles   HEART: s1 and s2 present  ABDOMEN:  Nontender and  Nondistended  EXTREMITIES: No pedal  edema  CNS: Awake and Alert      LABS:                        11.9   16.15 )-----------( 317      ( 10 Apr 2024 07:23 )             35.9       04-10    145  |  109<H>  |  14  ----------------------------<  128<H>  3.9   |  29  |  0.86    Ca    8.8      10 Apr 2024 07:23  Phos  3.0     04-10  Mg     2.4     04-10    TPro  6.7  /  Alb  2.0<L>  /  TBili  1.0  /  DBili  x   /  AST  25  /  ALT  30  /  AlkPhos  160<H>  04-09      CAPILLARY BLOOD GLUCOSE  POCT Blood Glucose.: 126 mg/dL (09 Apr 2024 17:37)        MEDICATIONS  (STANDING):  albuterol/ipratropium for Nebulization 3 milliLiter(s) Nebulizer every 6 hours  aMIOdarone    Tablet 200 milliGRAM(s) Oral daily  aMIOdarone Infusion 0.5 mG/Min (16.7 mL/Hr) IV Continuous <Continuous>  apixaban 5 milliGRAM(s) Oral two times a day  artificial  tears Solution 1 Drop(s) Both EYES two times a day  latanoprost 0.005% Ophthalmic Solution 1 Drop(s) Both EYES at bedtime  metoprolol tartrate 25 milliGRAM(s) Oral two times a day  mupirocin 2% Ointment 1 Application(s) Both Nostrils two times a day  pantoprazole    Tablet 40 milliGRAM(s) Oral before breakfast  piperacillin/tazobactam IVPB.. 3.375 Gram(s) IV Intermittent every 8 hours    MEDICATIONS  (PRN):  sodium chloride 0.9% lock flush 10 milliLiter(s) IV Push every 1 hour PRN Pre/post blood products, medications, blood draw, and to maintain line patency        RADIOLOGY & ADDITIONAL TESTS:

## 2024-04-10 NOTE — PROGRESS NOTE ADULT - SUBJECTIVE AND OBJECTIVE BOX
NP Note discussed with  primary attending    Patient is a 88y old  Female who presents with a chief complaint of AMS, hypoxia (10 Apr 2024 15:34)      INTERVAL HPI/OVERNIGHT EVENTS: no new complaints    MEDICATIONS  (STANDING):  albuterol/ipratropium for Nebulization 3 milliLiter(s) Nebulizer every 6 hours  aMIOdarone    Tablet 200 milliGRAM(s) Oral daily  aMIOdarone Infusion 0.5 mG/Min (16.7 mL/Hr) IV Continuous <Continuous>  apixaban 5 milliGRAM(s) Oral two times a day  artificial  tears Solution 1 Drop(s) Both EYES two times a day  latanoprost 0.005% Ophthalmic Solution 1 Drop(s) Both EYES at bedtime  metoprolol tartrate 25 milliGRAM(s) Oral two times a day  mupirocin 2% Ointment 1 Application(s) Both Nostrils two times a day  pantoprazole    Tablet 40 milliGRAM(s) Oral before breakfast  piperacillin/tazobactam IVPB.. 3.375 Gram(s) IV Intermittent every 8 hours    MEDICATIONS  (PRN):  sodium chloride 0.9% lock flush 10 milliLiter(s) IV Push every 1 hour PRN Pre/post blood products, medications, blood draw, and to maintain line patency      __________________________________________________  REVIEW OF SYSTEMS:    CONSTITUTIONAL: No fever,   EYES: no acute visual disturbances  NECK: No pain or stiffness  RESPIRATORY: No cough; No shortness of breath  CARDIOVASCULAR: No chest pain, no palpitations  GASTROINTESTINAL: No pain. No nausea or vomiting; No diarrhea   NEUROLOGICAL: No headache or numbness, no tremors  MUSCULOSKELETAL: No joint pain, no muscle pain  GENITOURINARY: no dysuria, no frequency, no hesitancy  PSYCHIATRY: no depression , no anxiety  ALL OTHER  ROS negative        Vital Signs Last 24 Hrs  T(C): 37.1 (10 Apr 2024 16:20), Max: 37.1 (10 Apr 2024 16:20)  T(F): 98.8 (10 Apr 2024 16:20), Max: 98.8 (10 Apr 2024 16:20)  HR: 92 (10 Apr 2024 16:20) (84 - 106)  BP: 148/97 (10 Apr 2024 16:20) (144/80 - 166/93)  BP(mean): 107 (09 Apr 2024 20:00) (97 - 107)  RR: 20 (10 Apr 2024 16:20) (18 - 41)  SpO2: 95% (10 Apr 2024 16:20) (94% - 100%)    Parameters below as of 10 Apr 2024 16:20  Patient On (Oxygen Delivery Method): nasal cannula  O2 Flow (L/min): 2      ________________________________________________  PHYSICAL EXAM:  GENERAL: NAD  HEENT: Normocephalic;  conjunctivae and sclerae clear; moist mucous membranes;   NECK : supple  CHEST/LUNG: Clear to ausculitation bilaterally with good air entry   HEART: S1 S2  regular; no murmurs, gallops or rubs  ABDOMEN: Soft, Nontender, Nondistended; Bowel sounds present  EXTREMITIES: no cyanosis; no edema; no calf tenderness  SKIN: warm and dry; no rash  NERVOUS SYSTEM:  Awake and alert; Oriented  to place, person and time ; no new deficits    _________________________________________________  LABS:                        11.9   16.15 )-----------( 317      ( 10 Apr 2024 07:23 )             35.9     04-10    145  |  109<H>  |  14  ----------------------------<  128<H>  3.9   |  29  |  0.86    Ca    8.8      10 Apr 2024 07:23  Phos  3.0     04-10  Mg     2.4     04-10    TPro  6.7  /  Alb  2.0<L>  /  TBili  1.0  /  DBili  x   /  AST  25  /  ALT  30  /  AlkPhos  160<H>  04-09      Urinalysis Basic - ( 10 Apr 2024 07:23 )    Color: x / Appearance: x / SG: x / pH: x  Gluc: 128 mg/dL / Ketone: x  / Bili: x / Urobili: x   Blood: x / Protein: x / Nitrite: x   Leuk Esterase: x / RBC: x / WBC x   Sq Epi: x / Non Sq Epi: x / Bacteria: x      CAPILLARY BLOOD GLUCOSE            RADIOLOGY & ADDITIONAL TESTS:  < from: Xray Chest 1 View- PORTABLE-Routine (Xray Chest 1 View- PORTABLE-Routine in AM.) (04.08.24 @ 10:22) >  COMPARISON STUDY: 4/7/2024    Frontal expiratory view of the chest shows the heart to be similar in   size. ET tube and right jugular central line are again noted.    The lungs show less pulmonary congestion with similar or smaller   effusions and there is no evidence of pneumothorax.    IMPRESSION:  Decreased congestion.      < end of copied text >  < from: CT Head No Cont (04.06.24 @ 11:57) >  IMPRESSION:  Moderate chronic microvascular changes without evidence of   an acute transcortical infarction or hemorrhage.    --- End of Report ---    < end of copied text >    Imaging Personally Reviewed:  YES    Consultant(s) Notes Reviewed:   YES    Care Discussed with Consultants :     Plan of care was discussed with patient and /or primary care giver; all questions and concerns were addressed and care was aligned with patient's wishes.

## 2024-04-11 NOTE — PROGRESS NOTE ADULT - SUBJECTIVE AND OBJECTIVE BOX
NP Note discussed with  primary attending    Patient is a 88y old  Female who presents with a chief complaint of AMS, hypoxia (11 Apr 2024 15:00)      INTERVAL HPI/OVERNIGHT EVENTS: no new complaints    MEDICATIONS  (STANDING):  albuterol/ipratropium for Nebulization 3 milliLiter(s) Nebulizer every 6 hours  aMIOdarone    Tablet 200 milliGRAM(s) Oral daily  aMIOdarone Infusion 0.5 mG/Min (16.7 mL/Hr) IV Continuous <Continuous>  apixaban 5 milliGRAM(s) Oral two times a day  artificial  tears Solution 1 Drop(s) Both EYES two times a day  ceFAZolin   IVPB 2000 milliGRAM(s) IV Intermittent every 8 hours  ceFAZolin   IVPB      furosemide    Tablet 20 milliGRAM(s) Oral daily  latanoprost 0.005% Ophthalmic Solution 1 Drop(s) Both EYES at bedtime  metoprolol tartrate 25 milliGRAM(s) Oral two times a day  mupirocin 2% Ointment 1 Application(s) Both Nostrils two times a day  pantoprazole    Tablet 40 milliGRAM(s) Oral before breakfast    MEDICATIONS  (PRN):  acetaminophen     Tablet .. 650 milliGRAM(s) Oral every 6 hours PRN Mild Pain (1 - 3)  sodium chloride 0.9% lock flush 10 milliLiter(s) IV Push every 1 hour PRN Pre/post blood products, medications, blood draw, and to maintain line patency      __________________________________________________  REVIEW OF SYSTEMS:    CONSTITUTIONAL: No fever,   EYES: no acute visual disturbances  NECK: No pain or stiffness  RESPIRATORY: No cough; No shortness of breath  CARDIOVASCULAR: No chest pain, no palpitations  GASTROINTESTINAL: No pain. No nausea or vomiting; No diarrhea   NEUROLOGICAL: No headache or numbness, no tremors  MUSCULOSKELETAL: No joint pain, no muscle pain  GENITOURINARY: no dysuria, no frequency, no hesitancy  PSYCHIATRY: no depression , no anxiety  ALL OTHER  ROS negative        Vital Signs Last 24 Hrs  T(C): 37.3 (11 Apr 2024 15:53), Max: 37.3 (11 Apr 2024 15:53)  T(F): 99.1 (11 Apr 2024 15:53), Max: 99.1 (11 Apr 2024 15:53)  HR: 94 (11 Apr 2024 15:53) (81 - 94)  BP: 145/88 (11 Apr 2024 15:53) (128/75 - 150/92)  BP(mean): 107 (11 Apr 2024 14:32) (88 - 107)  RR: 19 (11 Apr 2024 15:53) (18 - 20)  SpO2: 95% (11 Apr 2024 15:53) (92% - 99%)    Parameters below as of 11 Apr 2024 15:53  Patient On (Oxygen Delivery Method): nasal cannula  O2 Flow (L/min): 3      ________________________________________________  PHYSICAL EXAM:  GENERAL: NAD  HEENT: Normocephalic;  conjunctivae and sclerae clear; moist mucous membranes;   NECK : supple  CHEST/LUNG: Clear to ausculitation bilaterally with good air entry   HEART: S1 S2  regular; no murmurs, gallops or rubs  ABDOMEN: Soft, Nontender, Nondistended; Bowel sounds present  EXTREMITIES: no cyanosis; no edema; no calf tenderness  SKIN: warm and dry; no rash  NERVOUS SYSTEM:  Awake and alert; Oriented  to place, person and time ; no new deficits    _________________________________________________  LABS:                        11.2   16.55 )-----------( 331      ( 11 Apr 2024 06:56 )             34.3     04-11    146<H>  |  109<H>  |  14  ----------------------------<  121<H>  3.5   |  32<H>  |  0.93    Ca    8.7      11 Apr 2024 06:56  Phos  3.0     04-10  Mg     2.4     04-10        Urinalysis Basic - ( 11 Apr 2024 06:56 )    Color: x / Appearance: x / SG: x / pH: x  Gluc: 121 mg/dL / Ketone: x  / Bili: x / Urobili: x   Blood: x / Protein: x / Nitrite: x   Leuk Esterase: x / RBC: x / WBC x   Sq Epi: x / Non Sq Epi: x / Bacteria: x      CAPILLARY BLOOD GLUCOSE            RADIOLOGY & ADDITIONAL TESTS:    Imaging Personally Reviewed:  YES    Consultant(s) Notes Reviewed:   YES    Care Discussed with Consultants :     Plan of care was discussed with patient and /or primary care giver; all questions and concerns were addressed and care was aligned with patient's wishes.

## 2024-04-11 NOTE — PROGRESS NOTE ADULT - ASSESSMENT
87 y/o female from [facility] AAOX 1-2 at baseline with pmhx of Afib (on Eliquis), arthritis, HTN, CVA, PFO, Brain Aneurysm, Lung Cancer diagnosed five years ago who presented to ED from nursing home for hypoxia and AMS. In ED VS: T 97.6, , BP 99/52, SPo2 99% on 8LNRB. Given 20mg IV lasix, 1mg ativan, Zosyn/ Vanc, 2LNS.   Patient became hypotensive and was started on peripheral Levophed, which has since been weaned off. Patient was severely hypercarbic requiring emergent intubation. Pt admitted to ICU for acute hypercarbic respiratory failure and septic shock secondary to UTI and concern of aspiration PNA. Started on Zosyn. Urine culture growing E. coli. CT head negative for acute findings. Self extubated and passed SAT on 4/8, now breathing on 4 L NC. Course complicated by Afib w/ RVR with runs of NSVT on 4/9, started on Amiodarone drip, now rate controlled NSR. Holding home lopressor 25mg BID for hypotension, now off pressors.  Speech and Swallow evaluated patient and recommended pureed diet with thin liquids as she has history of esophageal protrusion on previous barium swallow study.

## 2024-04-11 NOTE — PROGRESS NOTE ADULT - ASSESSMENT
# Severe Sepsis/Septic shock On admission ( fever + tachycardia + tachypnea +Hypoxia + Leukocytosis )  # S/P Hypercarbic respiratory failure - s/p Self extubation  # UTI - Ucx grew E.coli  # Aspiration pneumonia - MSSA PCR is positive    would recommend:    1. Continue Cefazolin since MSSA PCR is positive  2. Monitor WBC count, stay elevated  3. Supplemental oxygenation and Bronchodilator as needed  4. Aspiration precaution    d/w covering NP, Noah    Attending Attestation:    Spent more than 45 minutes on total encounter, more than 50 % of the visit was spent counseling and/or coordinating care by the Attending physician.

## 2024-04-11 NOTE — PROGRESS NOTE ADULT - SUBJECTIVE AND OBJECTIVE BOX
Date of Service 04-11-24 @ 10:38    CHIEF COMPLAINT:Patient is a 88y old  Female who presents with a chief complaint of AMS, hypoxia.Pt appears comfortable.    	  REVIEW OF SYSTEMS:  CONSTITUTIONAL: No fever, weight loss, or fatigue  EYES: No eye pain, visual disturbances, or discharge  ENT:  No difficulty hearing, tinnitus, vertigo; No sinus or throat pain  NECK: No pain or stiffness  RESPIRATORY: No cough, wheezing, chills or hemoptysis; No Shortness of Breath  CARDIOVASCULAR: No chest pain, palpitations, passing out, dizziness, or leg swelling  GASTROINTESTINAL: No abdominal or epigastric pain. No nausea, vomiting, or hematemesis; No diarrhea or constipation. No melena or hematochezia.  GENITOURINARY: No dysuria, frequency, hematuria, or incontinence  NEUROLOGICAL: No headaches, memory loss, loss of strength, numbness, or tremors  SKIN: No itching, burning, rashes, or lesions   LYMPH Nodes: No enlarged glands  ENDOCRINE: No heat or cold intolerance; No hair loss  MUSCULOSKELETAL: No joint pain or swelling; No muscle, back, or extremity pain  PSYCHIATRIC: No depression, anxiety, mood swings, or difficulty sleeping  HEME/LYMPH: No easy bruising, or bleeding gums  ALLERGY AND IMMUNOLOGIC: No hives or eczema	        PHYSICAL EXAM:  T(C): 37.1 (04-11-24 @ 08:36), Max: 37.1 (04-10-24 @ 16:20)  HR: 81 (04-11-24 @ 08:36) (81 - 98)  BP: 130/85 (04-11-24 @ 08:36) (130/73 - 166/93)  RR: 18 (04-11-24 @ 08:36) (18 - 20)  SpO2: 99% (04-11-24 @ 08:36) (92% - 99%)  Wt(kg): --  I&O's Summary    10 Apr 2024 07:01  -  11 Apr 2024 07:00  --------------------------------------------------------  IN: 0 mL / OUT: 1200 mL / NET: -1200 mL        Appearance: Normal	  HEENT:   Normal oral mucosa, PERRL, EOMI	  Lymphatic: No lymphadenopathy  Cardiovascular: Normal S1 S2, No JVD, No murmurs, No edema  Respiratory: Dec BS at bases	  Psychiatry: A & O x 3, Mood & affect appropriate  Gastrointestinal:  Soft, Non-tender, + BS	  Skin: No rashes, No ecchymoses, No cyanosis	  Neurologic: Non-focal  Extremities: Normal range of motion, No clubbing, cyanosis or edema  Vascular: Peripheral pulses palpable 2+ bilaterally    MEDICATIONS  (STANDING):  albuterol/ipratropium for Nebulization 3 milliLiter(s) Nebulizer every 6 hours  aMIOdarone    Tablet 200 milliGRAM(s) Oral daily  aMIOdarone Infusion 0.5 mG/Min (16.7 mL/Hr) IV Continuous <Continuous>  apixaban 5 milliGRAM(s) Oral two times a day  artificial  tears Solution 1 Drop(s) Both EYES two times a day  ceFAZolin   IVPB 2000 milliGRAM(s) IV Intermittent every 8 hours  ceFAZolin   IVPB      furosemide    Tablet 20 milliGRAM(s) Oral daily  latanoprost 0.005% Ophthalmic Solution 1 Drop(s) Both EYES at bedtime  metoprolol tartrate 25 milliGRAM(s) Oral two times a day  mupirocin 2% Ointment 1 Application(s) Both Nostrils two times a day  pantoprazole    Tablet 40 milliGRAM(s) Oral before breakfast      	  	  LABS:	 	  Troponin I, High Sensitivity Result: 22.6 ng/L (04-09 @ 03:24)                            11.2   16.55 )-----------( 331      ( 11 Apr 2024 06:56 )             34.3     04-11    146<H>  |  109<H>  |  14  ----------------------------<  121<H>  3.5   |  32<H>  |  0.93    Ca    8.7      11 Apr 2024 06:56  Phos  3.0     04-10  Mg     2.4     04-10

## 2024-04-11 NOTE — PROGRESS NOTE ADULT - SUBJECTIVE AND OBJECTIVE BOX
Patient is seen and examined at the bed side, is afebrile.      REVIEW OF SYSTEMS: Unable to obtain due to mental status      ALLERGIES: No Known Allergies      Vital Signs Last 24 Hrs  T(C): 36.6 (11 Apr 2024 20:28), Max: 37.3 (11 Apr 2024 15:53)  T(F): 97.9 (11 Apr 2024 20:28), Max: 99.1 (11 Apr 2024 15:53)  HR: 76 (11 Apr 2024 20:28) (76 - 94)  BP: 131/72 (11 Apr 2024 20:28) (128/75 - 150/92)  BP(mean): 107 (11 Apr 2024 14:32) (88 - 107)  RR: 19 (11 Apr 2024 20:28) (18 - 19)  SpO2: 100% (11 Apr 2024 20:28) (94% - 100%)    Parameters below as of 11 Apr 2024 20:28  Patient On (Oxygen Delivery Method): mask, nonrebreather      PHYSICAL EXAM:  GENERAL: Appears uncomfortable, on oxygen via NC  CHEST/LUNG:  Not using accessory muscles   HEART: s1 and s2 present  ABDOMEN:  Nontender and  Nondistended  EXTREMITIES: No pedal  edema  CNS: Awake and Alert      LABS:                        11.2   16.55 )-----------( 331      ( 11 Apr 2024 06:56 )             34.3                           11.9   16.15 )-----------( 317      ( 10 Apr 2024 07:23 )             35.9       04-11    146<H>  |  109<H>  |  14  ----------------------------<  121<H>  3.5   |  32<H>  |  0.93    Ca    8.7      11 Apr 2024 06:56  Phos  3.0     04-10  Mg     2.4     04-10      04-10    145  |  109<H>  |  14  ----------------------------<  128<H>  3.9   |  29  |  0.86    Ca    8.8      10 Apr 2024 07:23  Phos  3.0     04-10  Mg     2.4     04-10    TPro  6.7  /  Alb  2.0<L>  /  TBili  1.0  /  DBili  x   /  AST  25  /  ALT  30  /  AlkPhos  160<H>  04-09      CAPILLARY BLOOD GLUCOSE  POCT Blood Glucose.: 126 mg/dL (09 Apr 2024 17:37)        MEDICATIONS  (STANDING):    albuterol/ipratropium for Nebulization 3 milliLiter(s) Nebulizer every 6 hours  aMIOdarone    Tablet 200 milliGRAM(s) Oral daily  aMIOdarone Infusion 0.5 mG/Min (16.7 mL/Hr) IV Continuous <Continuous>  apixaban 5 milliGRAM(s) Oral two times a day  artificial  tears Solution 1 Drop(s) Both EYES two times a day  ceFAZolin   IVPB      ceFAZolin   IVPB 2000 milliGRAM(s) IV Intermittent every 8 hours  furosemide    Tablet 20 milliGRAM(s) Oral daily  latanoprost 0.005% Ophthalmic Solution 1 Drop(s) Both EYES at bedtime  metoprolol tartrate 25 milliGRAM(s) Oral two times a day  mupirocin 2% Ointment 1 Application(s) Both Nostrils two times a day  pantoprazole    Tablet 40 milliGRAM(s) Oral before breakfast        RADIOLOGY & ADDITIONAL TESTS:    4/8/24 : Xray Chest 1 View- PORTABLE-Routine (Xray Chest 1 View- PORTABLE-Routine in AM.) (04.08.24 @ 10:22) The lungs show less pulmonary congestion with similar or smaller   effusions and there is no evidence of pneumothorax.      4/7/24 : Xray Chest 1 View- PORTABLE-Routine (Xray Chest 1 View- PORTABLE-Routine in AM.) (04.07.24 @ 09:46) Lung Parenchyma/Pleura: Stable diffuse bilateral interstitial prominence,   likely due to emphysematous changes. Stable bibasilar opacities/effusions. No pneumothorax.      MICROBIOLOGY DATA:    MRSA/MSSA PCR (04.08.24 @ 03:45)   MRSA PCR Result.: NotDetec    Culture - Sputum . (04.07.24 @ 14:00)   Gram Stain:   Few polymorphonuclear leukocytes per low power field   No Squamous epithelial cells per low power field   No organisms seen per oil power field  Specimen Source: ET Tube ET Tube  Culture Results:   Normal Respiratory Nette present

## 2024-04-11 NOTE — PROGRESS NOTE ADULT - ASSESSMENT
89 y/o female from [facility] AAOX 1-2 at baseline with pmhx of Parox Afib, arthritis, HTN, CVA, PFO, Brain Aneurysm, Lung Cancer diagnosed five years ago presenting to ED from nursing home for hypoxia,CO2 retention,respiratory failure and suspected sepsis.  1.Tele monitoring..  2.Gentle diuresis.  3.ABX for uti and  pneumonia..  4.PAF-lovenox can be changed to eliquis,,on amio, b blocker.  5.ID eval appreciated.  6.PPI.  7.Pt is DNR/DNI.  8.PT eval.

## 2024-04-12 NOTE — PROGRESS NOTE ADULT - SUBJECTIVE AND OBJECTIVE BOX
NP Note discussed with  primary attending    Patient is a 88y old  Female who presents with a chief complaint of AMS, hypoxia (12 Apr 2024 13:46)      INTERVAL HPI/OVERNIGHT EVENTS: no new complaints    MEDICATIONS  (STANDING):  albuterol/ipratropium for Nebulization 3 milliLiter(s) Nebulizer every 6 hours  aMIOdarone    Tablet 200 milliGRAM(s) Oral daily  aMIOdarone Infusion 0.5 mG/Min (16.7 mL/Hr) IV Continuous <Continuous>  apixaban 5 milliGRAM(s) Oral two times a day  artificial  tears Solution 1 Drop(s) Both EYES two times a day  ceFAZolin   IVPB 2000 milliGRAM(s) IV Intermittent every 8 hours  ceFAZolin   IVPB      furosemide    Tablet 20 milliGRAM(s) Oral daily  latanoprost 0.005% Ophthalmic Solution 1 Drop(s) Both EYES at bedtime  metoprolol tartrate 25 milliGRAM(s) Oral two times a day  mupirocin 2% Ointment 1 Application(s) Both Nostrils two times a day  pantoprazole    Tablet 40 milliGRAM(s) Oral before breakfast    MEDICATIONS  (PRN):  acetaminophen     Tablet .. 650 milliGRAM(s) Oral every 6 hours PRN Mild Pain (1 - 3)  guaiFENesin Oral Liquid (Sugar-Free) 100 milliGRAM(s) Oral every 6 hours PRN Cough  sodium chloride 0.9% lock flush 10 milliLiter(s) IV Push every 1 hour PRN Pre/post blood products, medications, blood draw, and to maintain line patency      __________________________________________________  REVIEW OF SYSTEMS:    CONSTITUTIONAL: No fever,       Vital Signs Last 24 Hrs  T(C): 36.9 (12 Apr 2024 11:12), Max: 37.3 (11 Apr 2024 15:53)  T(F): 98.4 (12 Apr 2024 11:12), Max: 99.1 (11 Apr 2024 15:53)  HR: 83 (12 Apr 2024 11:12) (63 - 94)  BP: 135/64 (12 Apr 2024 11:12) (100/72 - 154/67)  BP(mean): --  RR: 19 (12 Apr 2024 11:12) (18 - 20)  SpO2: 93% (12 Apr 2024 11:12) (93% - 100%)    Parameters below as of 12 Apr 2024 11:12  Patient On (Oxygen Delivery Method): nasal cannula  O2 Flow (L/min): 2      ________________________________________________  PHYSICAL EXAM:  GENERAL: NAD  HEENT: Normocephalic;  conjunctivae and sclerae clear; moist mucous membranes;   NECK : supple  CHEST/LUNG: Clear to ausculitation bilaterally with good air entry   HEART: S1 S2  regular; no murmurs, gallops or rubs  ABDOMEN: Soft, Nontender, Nondistended; Bowel sounds present  EXTREMITIES: no cyanosis; no edema; no calf tenderness  SKIN: warm and dry; no rash  NERVOUS SYSTEM:  A&Ox1-2    _________________________________________________  LABS:                        11.3   16.45 )-----------( 365      ( 12 Apr 2024 08:20 )             34.8     04-12    145  |  110<H>  |  13  ----------------------------<  115<H>  3.6   |  32<H>  |  0.84    Ca    9.1      12 Apr 2024 08:20        Urinalysis Basic - ( 12 Apr 2024 08:20 )    Color: x / Appearance: x / SG: x / pH: x  Gluc: 115 mg/dL / Ketone: x  / Bili: x / Urobili: x   Blood: x / Protein: x / Nitrite: x   Leuk Esterase: x / RBC: x / WBC x   Sq Epi: x / Non Sq Epi: x / Bacteria: x      CAPILLARY BLOOD GLUCOSE            RADIOLOGY & ADDITIONAL TESTS:    Imaging Personally Reviewed:  YES    Consultant(s) Notes Reviewed:   YES    Care Discussed with Consultants :     Plan of care was discussed with patient and /or primary care giver; all questions and concerns were addressed and care was aligned with patient's wishes.

## 2024-04-12 NOTE — PROGRESS NOTE ADULT - ASSESSMENT
# Severe Sepsis/Septic shock On admission ( fever + tachycardia + tachypnea +Hypoxia + Leukocytosis )  # S/P Hypercarbic respiratory failure - s/p Self extubation  # UTI - Ucx grew E.coli  # Aspiration pneumonia - MSSA PCR is positive    would recommend:    1. PT/OOB to chair  2. Continue Cefazolin since MSSA PCR is positive  3. Monitor WBC count, stay elevated  4. Supplemental oxygenation and Bronchodilator as needed  5. Aspiration precaution    d/w covering NP, Noah    Attending Attestation:    Spent more than 35 minutes on total encounter, more than 50 % of the visit was spent counseling and/or coordinating care by the Attending physician.

## 2024-04-12 NOTE — DISCHARGE NOTE PROVIDER - DISCHARGE DIET
A/P:     Patient is a 34 y/o F s/p FFS 9/6 c/b reintubation in PACU for low saturation and increased work of breathing, remains in ICU:     - Recs per ICU, appreciate care and management of vent. Wean vent and extubate as tolerated   - Follow up repeat CXR   - Clear diet once extubated  - Continue Decadron for swelling   - Pain medication  - SCDs   - HOB elevation   - Will follow     Plastic Surgery    Pureed Diet

## 2024-04-12 NOTE — DISCHARGE NOTE PROVIDER - NSDCCPCAREPLAN_GEN_ALL_CORE_FT
PRINCIPAL DISCHARGE DIAGNOSIS  Diagnosis: Septic shock  Assessment and Plan of Treatment: You were admitted and treated for sepsis in the ICU with IV antibiotics. Sepsis is the body's extreme response to an infection. The cause of your sepsis is likely due to a urinary tract infection.   On discharge you should take all antibiotics as ordered.  Call you Health care provider upon arrival home to make a one week follow up appointment.  If you develop fever, chills, malaise, or change in mental status call your Health Care Provider or go to the Emergency Department.  Nutrition is important, eat small frequent meals to help ensure you get adequate calories.  Do not stay in bed all day!  Increase your activity daily as tolerated.        SECONDARY DISCHARGE DIAGNOSES  Diagnosis: Acute UTI  Assessment and Plan of Treatment: You were treated for a urinary tract infection with IV antibiotics. If you were prescribed antibiotics, take them exactly as your caregiver instructs you. Finish the medication even if you feel better after you have only taken some of the medication.  Drink enough water and fluids to keep your urine clear or pale yellow.  Avoid caffeine, tea, and carbonated beverages. They tend to irritate your bladder.  Empty your bladder often. Avoid holding urine for long periods of time.  After a bowel movement, women should cleanse from front to back. Use each tissue only once.  SEEK MEDICAL CARE IF:  You have back pain.  You develop a fever.  Your symptoms do not begin to resolve within 3 days.  SEEK IMMEDIATE MEDICAL CARE IF:  You have severe back pain or lower abdominal pain.  You develop chills.  You have nausea or vomiting.  You have continued burning or discomfort with urination.      Diagnosis: Acute respiratory failure with hypercapnia  Assessment and Plan of Treatment: You were treated for respiratory failure in the ICU. Acute respiratory failure is a condition that happens when your lungs cannot get enough oxygen into your blood or when you have too much carbon dioxide in your blood. Call your local emergency number (911 in the [place]) or have someone call if: You have more trouble catching your breath. You have stopped breathing.  Manage ARF: Do not smoke. Nicotine and other chemicals in cigarettes and cigars can cause lung damage and make your symptoms worse. Prevent the spread of germs. Wash your hands often with soap and water. Cover your mouth when you cough. Cough into a tissue or your shirtsleeve so you do not spread germs from your hands. If you are sick, stay away from others as much as possible. Ask about vaccines you may need. Vaccines can help prevent some lung infections. Examples include pneumonia, COVID-19. Get a flu vaccine every year as soon as it is recommended, usually in September or October.  Follow up with your doctor      Diagnosis: Pneumonia, aspiration  Assessment and Plan of Treatment: You were treated for aspiration pneumonia with IV antibiotics. Aspiration pneumonia is a lung infection that develops after you aspirate (inhale) food, liquid, or vomit into your lungs. You can also aspirate food or liquid from your stomach that backs up into your esophagus. If you are not able to cough up the aspirated material, bacteria can grow in your lungs and cause an infection.  Continue all antibiotics as ordered until complete.  Nutrition is important, eat small frequent meals.  Get lots of rest and drink fluids.  Call your health care provider upon arrival home from hospital and make a follow up appointment for one week.  If your cough worsens, you develop fever greater than 101', you have shaking chills, a fast heartbeat, trouble breathing and/or feel your are breathing much faster than usual, call your healthcare provider.  Make sure you wash your hands frequently.      Diagnosis: Chronic atrial fibrillation  Assessment and Plan of Treatment: Atrial fibrillation is the most common heart rhythm problem & has the risk of stroke & heart attack  It helps if you control your blood pressure, not drink more than 1-2 alcohol drinks per day, cut down on caffeine, getting treatment for over active thyroid gland, & getting exercise  Call your doctor if you feel your heart racing or beating unusually, chest tightness or pain, lightheaded, faint, shortness of breath especially with exercise  It is important to take your heart medication as prescribed  You may be on anticoagulation which is very important to take as directed - you may need blood work to monitor drug levels      Diagnosis: HTN (hypertension)  Assessment and Plan of Treatment: You have a history of high blood pressure. High blood pressure is a condition that puts you at risk for heart attack, stroke and kidney disease. Please continue to take your medications as prescribed. You can also help control your blood pressure by maintaining a healthy weight, eating a diet low in fat and rich in fruits and vegetables, reduce the amount of salt in your diet. Also, reduce alcohol and try to include some form of physical activity daily for at least 30 mins. Follow up with your medical doctor to establish long term blood pressure treatment goals.  Notify your doctor if you have any of the following symptoms:   Dizziness, Lightheadedness, Blurry vision, Headache, Chest pain, Shortness of breath      Diagnosis: CVA (cerebrovascular accident)  Assessment and Plan of Treatment: History of cva, take medications as prescribed . A stroke is a brain attack that occurs when an artery or a blood vessel becomes occluded breaks, interrupting blood flow to an area of the brain cells begin to die. Please call 911 for facial droop slurring speech, unable to move a limb or sudden weakness in one limb or one side of the body or confusion.

## 2024-04-12 NOTE — PROGRESS NOTE ADULT - ASSESSMENT
89 y/o female from [facility] AAOX 1-2 at baseline with pmhx of Parox Afib, arthritis, HTN, CVA, PFO, Brain Aneurysm, Lung Cancer diagnosed five years ago presenting to ED from nursing home for hypoxia,CO2 retention,respiratory failure and suspected sepsis.  1.Tele monitoring..  2.Gentle diuresis.  3.ABX for uti and  pneumonia..  4.PAF-eliquis,,on amio, b blocker.  5.ID f/u.  6.PPI.  7.Pt is DNR/DNI.  8.PT rec HEMANT.

## 2024-04-12 NOTE — DISCHARGE NOTE PROVIDER - HOSPITAL COURSE
89 y/o female from [facility] AAOX 1-2 at baseline with pmhx of Afib (on Eliquis), arthritis, HTN, CVA, PFO, Brain Aneurysm, Lung Cancer diagnosed five years ago who presented to ED from nursing home for hypoxia and AMS. In ED VS: T 97.6, , BP 99/52, SPo2 99% on 8LNRB. Given 20mg IV lasix, 1mg ativan, Zosyn/ Vanc, 2LNS.   Patient became hypotensive and was started on peripheral Levophed, which has since been weaned off. Patient was severely hypercarbic requiring emergent intubation. Pt admitted to ICU for acute hypercarbic respiratory failure and septic shock secondary to UTI and concern of aspiration PNA. Started on Zosyn. Urine culture growing E. coli. CT head negative for acute findings. Self extubated and passed SAT on 4/8, now breathing on 4 L NC. Course complicated by Afib w/ RVR with runs of NSVT on 4/9, started on Amiodarone drip, now rate controlled NSR.  Speech and Swallow evaluated patient and recommended pureed diet with thin liquids.    PT recc: Sub-acute Rehab    Case discussed with attending, pt medically stable for d/c. Please note that this a brief summary of hospital course please refer to daily progress notes and consult notes for full course and events         89 y/o female from [facility] AAOX 1-2 at baseline with PMH of Afib (on Eliquis), arthritis, HTN, CVA, PFO, Brain Aneurysm, Lung Cancer diagnosed five years ago who presented to ED from nursing home for hypoxia and AMS. In ED VS: T 97.6, , BP 99/52, SPo2 99% on 8LNRB. Given 20mg IV Lasix, 1mg ativan, Zosyn/ Vanc, 2LNS.   Patient became hypotensive and was started on peripheral Levophed, which has since been weaned off. Patient was severely hypercarbic requiring emergent intubation. Pt admitted to ICU for acute hypercarbic respiratory failure and septic shock secondary to UTI and concern of aspiration PNA. Started on Zosyn. Urine culture growing E. coli. CT head negative for acute findings. Self extubated and passed SAT on 4/8, now on 2L NC. Course complicated by Afib w/ RVR with runs of NSVT on 4/9, started on Amiodarone drip, now rate controlled NSR and transitioned to PO Amiodarone.  Speech and Swallow evaluated patient and recommended pureed diet with thin liquids.    PT recommended Sub-acute Rehab    Case discussed with attending, pt medically stable for d/c. Please note that this a brief summary of hospital course please refer to daily progress notes and consult notes for full course and events

## 2024-04-12 NOTE — PROGRESS NOTE ADULT - ASSESSMENT
87 y/o female from [facility] AAOX 1-2 at baseline with pmhx of Afib (on Eliquis), arthritis, HTN, CVA, PFO, Brain Aneurysm, Lung Cancer diagnosed five years ago who presented to ED from nursing home for hypoxia and AMS. In ED VS: T 97.6, , BP 99/52, SPo2 99% on 8LNRB. Given 20mg IV lasix, 1mg ativan, Zosyn/ Vanc, 2LNS.   Patient became hypotensive and was started on peripheral Levophed, which has since been weaned off. Patient was severely hypercarbic requiring emergent intubation. Pt admitted to ICU for acute hypercarbic respiratory failure and septic shock secondary to UTI and concern of aspiration PNA. Started on Zosyn. Urine culture growing E. coli. CT head negative for acute findings. Self extubated and passed SAT on 4/8, now breathing on 4 L NC. Course complicated by Afib w/ RVR with runs of NSVT on 4/9, started on Amiodarone drip, now rate controlled NSR. Holding home lopressor 25mg BID for hypotension, now off pressors.  Speech and Swallow evaluated patient and recommended pureed diet with thin liquids as she has history of esophageal protrusion on previous barium swallow study.

## 2024-04-12 NOTE — PROGRESS NOTE ADULT - SUBJECTIVE AND OBJECTIVE BOX
Date of Service 04-12-24 @ 13:07    CHIEF COMPLAINT:Patient is a 88y old  Female who presents with a chief complaint of AMS, hypoxia.Pt appears comfortable.    	  REVIEW OF SYSTEMS:  CONSTITUTIONAL: No fever, weight loss, or fatigue  EYES: No eye pain, visual disturbances, or discharge  ENT:  No difficulty hearing, tinnitus, vertigo; No sinus or throat pain  NECK: No pain or stiffness  RESPIRATORY: No cough, wheezing, chills or hemoptysis; No Shortness of Breath  CARDIOVASCULAR: No chest pain, palpitations, passing out, dizziness, or leg swelling  GASTROINTESTINAL: No abdominal or epigastric pain. No nausea, vomiting, or hematemesis; No diarrhea or constipation. No melena or hematochezia.  GENITOURINARY: No dysuria, frequency, hematuria, or incontinence  NEUROLOGICAL: No headaches, memory loss, loss of strength, numbness, or tremors  SKIN: No itching, burning, rashes, or lesions   LYMPH Nodes: No enlarged glands  ENDOCRINE: No heat or cold intolerance; No hair loss  MUSCULOSKELETAL: No joint pain or swelling; No muscle, back, or extremity pain  PSYCHIATRIC: No depression, anxiety, mood swings, or difficulty sleeping  HEME/LYMPH: No easy bruising, or bleeding gums  ALLERGY AND IMMUNOLOGIC: No hives or eczema	        PHYSICAL EXAM:  T(C): 36.9 (04-12-24 @ 11:12), Max: 37.3 (04-11-24 @ 15:53)  HR: 83 (04-12-24 @ 11:12) (63 - 94)  BP: 135/64 (04-12-24 @ 11:12) (100/72 - 154/67)  RR: 19 (04-12-24 @ 11:12) (18 - 20)  SpO2: 93% (04-12-24 @ 11:12) (93% - 100%)  Wt(kg): --  I&O's Summary    11 Apr 2024 07:01  -  12 Apr 2024 07:00  --------------------------------------------------------  IN: 0 mL / OUT: 550 mL / NET: -550 mL    12 Apr 2024 07:01  -  12 Apr 2024 13:07  --------------------------------------------------------  IN: 0 mL / OUT: 350 mL / NET: -350 mL        Appearance: Normal	  HEENT:   Normal oral mucosa, PERRL, EOMI	  Lymphatic: No lymphadenopathy  Cardiovascular: Normal S1 S2, No JVD, No murmurs, No edema  Respiratory: Dec BS at bases	  Psychiatry: A & O x 3, Mood & affect appropriate  Gastrointestinal:  Soft, Non-tender, + BS	  Skin: No rashes, No ecchymoses, No cyanosis	  Neurologic: Non-focal  Extremities: Normal range of motion, No clubbing, cyanosis or edema  Vascular: Peripheral pulses palpable 2+ bilaterally    MEDICATIONS  (STANDING):  albuterol/ipratropium for Nebulization 3 milliLiter(s) Nebulizer every 6 hours  aMIOdarone    Tablet 200 milliGRAM(s) Oral daily  aMIOdarone Infusion 0.5 mG/Min (16.7 mL/Hr) IV Continuous <Continuous>  apixaban 5 milliGRAM(s) Oral two times a day  artificial  tears Solution 1 Drop(s) Both EYES two times a day  ceFAZolin   IVPB 2000 milliGRAM(s) IV Intermittent every 8 hours  ceFAZolin   IVPB      furosemide    Tablet 20 milliGRAM(s) Oral daily  latanoprost 0.005% Ophthalmic Solution 1 Drop(s) Both EYES at bedtime  metoprolol tartrate 25 milliGRAM(s) Oral two times a day  mupirocin 2% Ointment 1 Application(s) Both Nostrils two times a day  pantoprazole    Tablet 40 milliGRAM(s) Oral before breakfast      	  	  LABS:	 	                       11.3   16.45 )-----------( 365      ( 12 Apr 2024 08:20 )             34.8     04-12    145  |  110<H>  |  13  ----------------------------<  115<H>  3.6   |  32<H>  |  0.84    Ca    9.1      12 Apr 2024 08:20

## 2024-04-12 NOTE — PROGRESS NOTE ADULT - SUBJECTIVE AND OBJECTIVE BOX
Patient is seen and examined at the bed side, is afebrile. She mentioned feeling better, nodded her head when I asked if she is feeling better today. The WBC count stay elevated.       REVIEW OF SYSTEMS: All other review systems are negative      ALLERGIES: No Known Allergies      Vital Signs Last 24 Hrs  T(C): 36.9 (12 Apr 2024 20:00), Max: 37.1 (11 Apr 2024 23:30)  T(F): 98.4 (12 Apr 2024 20:00), Max: 98.8 (11 Apr 2024 23:30)  HR: 92 (12 Apr 2024 20:00) (63 - 92)  BP: 156/88 (12 Apr 2024 20:00) (100/72 - 156/88)  BP(mean): --  RR: 19 (12 Apr 2024 20:00) (18 - 20)  SpO2: 94% (12 Apr 2024 20:00) (93% - 98%)    Parameters below as of 12 Apr 2024 20:00  Patient On (Oxygen Delivery Method): nasal cannula  O2 Flow (L/min): 2        PHYSICAL EXAM:  GENERAL: Appears comfortable, on oxygen via NC  CHEST/LUNG:  Not using accessory muscles   HEART: s1 and s2 present  ABDOMEN:  Nontender and  Nondistended  EXTREMITIES: No pedal  edema  CNS: Awake and Alert      LABS:                        11.3   16.45 )-----------( 365      ( 12 Apr 2024 08:20 )             34.8                           11.2   16.55 )-----------( 331      ( 11 Apr 2024 06:56 )             34.3         04-12    145  |  110<H>  |  13  ----------------------------<  115<H>  3.6   |  32<H>  |  0.84    Ca    9.1      12 Apr 2024 08:20      04-11    146<H>  |  109<H>  |  14  ----------------------------<  121<H>  3.5   |  32<H>  |  0.93    Ca    8.7      11 Apr 2024 06:56  Phos  3.0     04-10  Mg     2.4     04-10    TPro  6.7  /  Alb  2.0<L>  /  TBili  1.0  /  DBili  x   /  AST  25  /  ALT  30  /  AlkPhos  160<H>  04-09      CAPILLARY BLOOD GLUCOSE  POCT Blood Glucose.: 126 mg/dL (09 Apr 2024 17:37)        MEDICATIONS  (STANDING):    albuterol/ipratropium for Nebulization 3 milliLiter(s) Nebulizer every 6 hours  aMIOdarone    Tablet 200 milliGRAM(s) Oral daily  aMIOdarone Infusion 0.5 mG/Min (16.7 mL/Hr) IV Continuous <Continuous>  apixaban 5 milliGRAM(s) Oral two times a day  artificial  tears Solution 1 Drop(s) Both EYES two times a day  ceFAZolin   IVPB 2000 milliGRAM(s) IV Intermittent every 8 hours  ceFAZolin   IVPB      furosemide    Tablet 20 milliGRAM(s) Oral daily  latanoprost 0.005% Ophthalmic Solution 1 Drop(s) Both EYES at bedtime  metoprolol tartrate 25 milliGRAM(s) Oral two times a day  mupirocin 2% Ointment 1 Application(s) Both Nostrils two times a day  pantoprazole    Tablet 40 milliGRAM(s) Oral before breakfast        RADIOLOGY & ADDITIONAL TESTS:    4/8/24 : Xray Chest 1 View- PORTABLE-Routine (Xray Chest 1 View- PORTABLE-Routine in AM.) (04.08.24 @ 10:22) The lungs show less pulmonary congestion with similar or smaller   effusions and there is no evidence of pneumothorax.      4/7/24 : Xray Chest 1 View- PORTABLE-Routine (Xray Chest 1 View- PORTABLE-Routine in AM.) (04.07.24 @ 09:46) Lung Parenchyma/Pleura: Stable diffuse bilateral interstitial prominence,   likely due to emphysematous changes. Stable bibasilar opacities/effusions. No pneumothorax.      MICROBIOLOGY DATA:    Culture - Urine (04.06.24 @ 09:04)   - Trimethoprim/Sulfamethoxazole: S <=0.5/9.5  - Meropenem: S <=1  - Nitrofurantoin: S <=32 Should not be used to treat pyelonephritis  - Piperacillin/Tazobactam: S <=8  - Tobramycin: R 8  - Cefoxitin: S <=8  - Ceftriaxone: S <=1  - Cefuroxime: S <=4  - Ciprofloxacin: R >2  - Ertapenem: S <=0.5  - Gentamicin: R >8  - Imipenem: S <=1  - Levofloxacin: R >4  - Amoxicillin/Clavulanic Acid: S <=8/4 Consider reserving for cystitis when ampicillin/sulbactam is resistant  - Ampicillin: R >16 These ampicillin results predict results for amoxicillin  - Ampicillin/Sulbactam: R >16/8  - Aztreonam: S <=4  - Cefazolin: S 4 For uncomplicated UTI with K. pneumoniae, E. coli, or P. mirablis: EMILY <=16 is sensitive and EMILY >=32 is resistant. This also predicts results for oral agents cefaclor, cefdinir, cefpodoxime, cefprozil, cefuroxime axetil, cephalexin and locarbef for uncomplicated UTI. Note that some isolates may be susceptible to these agents while testing resistant to cefazolin.  - Cefepime: S <=2  Specimen Source: Clean Catch Clean Catch (Midstream)  Culture Results:   >100,000 CFU/ml Escherichia coli  Organism Identification: Escherichia coli  Organism: Escherichia coli  Method Type: EMILY  MRSA/MSSA PCR (04.08.24 @ 03:45)   MRSA PCR Result.: NotDetec    Culture - Sputum . (04.07.24 @ 14:00)   Gram Stain:   Few polymorphonuclear leukocytes per low power field   No Squamous epithelial cells per low power field   No organisms seen per oil power field  Specimen Source: ET Tube ET Tube  Culture Results:   Normal Respiratory Nette present

## 2024-04-12 NOTE — DISCHARGE NOTE PROVIDER - NSDCMRMEDTOKEN_GEN_ALL_CORE_FT
acetaminophen 325 mg oral tablet: 2 tab(s) orally 3 times a day  apixaban 5 mg oral tablet: 1 tab(s) orally 2 times a day  Artificial Tears ophthalmic solution: 1 drop(s) in each eye 2 times a day  Colace 100 mg oral capsule: 3 cap(s) orally once a day (at bedtime)  furosemide 20 mg oral tablet: 1 tab(s) orally 3 times a week Tuesday, Thursday, Saturday for LE edema  ipratropium-albuterol 0.5 mg-2.5 mg/3 mL inhalation solution: 3 milliliter(s) by nebulizer every 6 hours  latanoprost ophthalmic: 1 drop(s) to each affected eye once a day  Levaquin 500 mg oral tablet: 1 tab(s) orally once a day (at bedtime)  metoprolol tartrate 25 mg oral tablet: 1 tab(s) orally 2 times a day  senna leaf extract oral tablet: 2 tab(s) orally once a day (at bedtime)   acetaminophen 325 mg oral tablet: 2 tab(s) orally every 6 hours As needed Mild Pain (1 - 3)  amiodarone 200 mg oral tablet: 1 tab(s) orally once a day  apixaban 5 mg oral tablet: 1 tab(s) orally 2 times a day  Artificial Tears ophthalmic solution: 1 drop(s) in each eye 2 times a day  Augmentin 875 mg-125 mg oral tablet: 1 tab(s) orally every 12 hours Continue until 4/18  Colace 100 mg oral capsule: 3 cap(s) orally once a day (at bedtime)  guaiFENesin 100 mg/5 mL oral liquid: 5 milliliter(s) orally every 6 hours As needed Cough  ipratropium-albuterol 0.5 mg-2.5 mg/3 mL inhalation solution: 3 milliliter(s) by nebulizer every 6 hours  latanoprost ophthalmic: 1 drop(s) to each affected eye once a day  metoprolol tartrate 25 mg oral tablet: 1 tab(s) orally 2 times a day  senna leaf extract oral tablet: 2 tab(s) orally once a day (at bedtime)

## 2024-04-12 NOTE — DISCHARGE NOTE PROVIDER - CARE PROVIDER_API CALL
Jaelyn Nation  Cardiology  [address] [address]  Phone: [phone]  Fax: [phone]  Follow Up Time:

## 2024-04-13 NOTE — PROGRESS NOTE ADULT - ASSESSMENT
89 y/o female from [facility] AAOX 1-2 at baseline with pmhx of Parox Afib, arthritis, HTN, CVA, PFO, Brain Aneurysm, Lung Cancer diagnosed five years ago presenting to ED from nursing home for hypoxia,CO2 retention,respiratory failure and suspected sepsis.  1.Tele monitoring..  2.Gentle diuresis.  3.ABX for uti and  pneumonia..  4.PAF-eliquis,,on amio, b blocker.  5.ID f/u.  6.PPI.  7.Pt is DNR/DNI.  8.PT rec HEMANT.  9.Replace k+.

## 2024-04-13 NOTE — PROGRESS NOTE ADULT - ASSESSMENT
Patient is a 88y old  Female who is from [facility] AAOX 1-2 at baseline with pmhx of Afib (on Eliquis), arthritis, HTN, CVA, PFO, Brain Aneurysm, Lung Cancer diagnosed five years ago, sent in to the ER on 4/6/24 for evaluation of hypoxia and AMS. On admission, she found ot have fever, tachycardia and Given 20mg IV lasix, 1mg ativan, Zosyn/ Vanc, 2LNS.   Patient became hypotensive required pressor, which has been weaned off, but Patient became severely hypercarbic requiring emergent intubation and admitted to ICU for acute hypercarbic respiratory failure and septic shock secondary to UTI and concern of aspiration PNA. Started on Zosyn. Urine culture growing E. coli. CT head negative for acute findings. Self extubated and passed SAT on 4/8, now breathing on 4 L NC. Course complicated by Afib w/ RVR with runs of NSVT on 4/9, started on Amiodarone drip, now rate controlled NSR. Holding home lopressor 25mg BID for hypotension, now off pressors.  Speech and Swallow evaluated patient and recommended pureed diet with thin liquids as she has history of esophageal protrusion on previous barium swallow study. Now The ID consult requested to assist with further antibiotic management.     # Severe Sepsis/Septic shock On admission ( fever + tachycardia + tachypnea +Hypoxia + Leukocytosis )- resolving   # S/P Hypercarbic respiratory failure - s/p Self extubation  # UTI - Ucx grew E.coli  # Aspiration pneumonia - MSSA PCR is positive    would recommend:    1. PT/OOB to chair  2. Continue Cefazolin since MSSA PCR is positive, until 4/18/24, may change to oral Abx on discharge  3. Monitor WBC count, stay elevated  4. Supplemental oxygenation and Bronchodilator as needed  5. Aspiration precaution      Attending Attestation:    Spent more than 35 minutes on total encounter, more than 50 % of the visit was spent counseling and/or coordinating care by the Attending physician.

## 2024-04-13 NOTE — PROGRESS NOTE ADULT - SUBJECTIVE AND OBJECTIVE BOX
Patient is seen and examined at the bed side, is afebrile. She is doing much better, conversing.  The WBC count stay elevated.       REVIEW OF SYSTEMS: All other review systems are negative      ALLERGIES: No Known Allergies      Vital Signs Last 24 Hrs  T(C): 37.1 (13 Apr 2024 15:45), Max: 37.1 (13 Apr 2024 15:45)  T(F): 98.8 (13 Apr 2024 15:45), Max: 98.8 (13 Apr 2024 15:45)  HR: 76 (13 Apr 2024 15:45) (76 - 95)  BP: 141/76 (13 Apr 2024 15:45) (134/73 - 154/89)  BP(mean): --  RR: 18 (13 Apr 2024 15:45) (18 - 18)  SpO2: 100% (13 Apr 2024 15:45) (93% - 100%)    Parameters below as of 13 Apr 2024 15:45  Patient On (Oxygen Delivery Method): nasal cannula  O2 Flow (L/min): 2        PHYSICAL EXAM:  GENERAL: Appears comfortable, on oxygen via NC  CHEST/LUNG:  Not using accessory muscles   HEART: s1 and s2 present  ABDOMEN:  Nontender and  Nondistended  EXTREMITIES: No pedal  edema  CNS: Awake and Alert      LABS:                        11.1   16.29 )-----------( 359      ( 13 Apr 2024 06:13 )             33.5                           11.3   16.45 )-----------( 365      ( 12 Apr 2024 08:20 )             34.8                    04-12    145  |  110<H>  |  13  ----------------------------<  115<H>  3.6   |  32<H>  |  0.84    Ca    9.1      12 Apr 2024 08:20      04-11    146<H>  |  109<H>  |  14  ----------------------------<  121<H>  3.5   |  32<H>  |  0.93    Ca    8.7      11 Apr 2024 06:56  Phos  3.0     04-10  Mg     2.4     04-10    TPro  6.7  /  Alb  2.0<L>  /  TBili  1.0  /  DBili  x   /  AST  25  /  ALT  30  /  AlkPhos  160<H>  04-09      CAPILLARY BLOOD GLUCOSE  POCT Blood Glucose.: 126 mg/dL (09 Apr 2024 17:37)        MEDICATIONS  (STANDING):    albuterol/ipratropium for Nebulization 3 milliLiter(s) Nebulizer every 6 hours  aMIOdarone    Tablet 200 milliGRAM(s) Oral daily  aMIOdarone Infusion 0.5 mG/Min (16.7 mL/Hr) IV Continuous <Continuous>  apixaban 5 milliGRAM(s) Oral two times a day  artificial  tears Solution 1 Drop(s) Both EYES two times a day  ceFAZolin   IVPB 2000 milliGRAM(s) IV Intermittent every 8 hours  ceFAZolin   IVPB      furosemide    Tablet 20 milliGRAM(s) Oral daily  latanoprost 0.005% Ophthalmic Solution 1 Drop(s) Both EYES at bedtime  metoprolol tartrate 25 milliGRAM(s) Oral two times a day  mupirocin 2% Ointment 1 Application(s) Both Nostrils two times a day  pantoprazole    Tablet 40 milliGRAM(s) Oral before breakfast        RADIOLOGY & ADDITIONAL TESTS:    4/8/24 : Xray Chest 1 View- PORTABLE-Routine (Xray Chest 1 View- PORTABLE-Routine in AM.) (04.08.24 @ 10:22) The lungs show less pulmonary congestion with similar or smaller   effusions and there is no evidence of pneumothorax.      4/7/24 : Xray Chest 1 View- PORTABLE-Routine (Xray Chest 1 View- PORTABLE-Routine in AM.) (04.07.24 @ 09:46) Lung Parenchyma/Pleura: Stable diffuse bilateral interstitial prominence,   likely due to emphysematous changes. Stable bibasilar opacities/effusions. No pneumothorax.      MICROBIOLOGY DATA:    Culture - Urine (04.06.24 @ 09:04)   - Trimethoprim/Sulfamethoxazole: S <=0.5/9.5  - Meropenem: S <=1  - Nitrofurantoin: S <=32 Should not be used to treat pyelonephritis  - Piperacillin/Tazobactam: S <=8  - Tobramycin: R 8  - Cefoxitin: S <=8  - Ceftriaxone: S <=1  - Cefuroxime: S <=4  - Ciprofloxacin: R >2  - Ertapenem: S <=0.5  - Gentamicin: R >8  - Imipenem: S <=1  - Levofloxacin: R >4  - Amoxicillin/Clavulanic Acid: S <=8/4 Consider reserving for cystitis when ampicillin/sulbactam is resistant  - Ampicillin: R >16 These ampicillin results predict results for amoxicillin  - Ampicillin/Sulbactam: R >16/8  - Aztreonam: S <=4  - Cefazolin: S 4 For uncomplicated UTI with K. pneumoniae, E. coli, or P. mirablis: EMILY <=16 is sensitive and EMILY >=32 is resistant. This also predicts results for oral agents cefaclor, cefdinir, cefpodoxime, cefprozil, cefuroxime axetil, cephalexin and locarbef for uncomplicated UTI. Note that some isolates may be susceptible to these agents while testing resistant to cefazolin.  - Cefepime: S <=2  Specimen Source: Clean Catch Clean Catch (Midstream)  Culture Results:   >100,000 CFU/ml Escherichia coli  Organism Identification: Escherichia coli  Organism: Escherichia coli  Method Type: EMILY  MRSA/MSSA PCR (04.08.24 @ 03:45)   MRSA PCR Result.: NotDetec    Culture - Sputum . (04.07.24 @ 14:00)   Gram Stain:   Few polymorphonuclear leukocytes per low power field   No Squamous epithelial cells per low power field   No organisms seen per oil power field  Specimen Source: ET Tube ET Tube  Culture Results:   Normal Respiratory Nette present

## 2024-04-13 NOTE — PROGRESS NOTE ADULT - SUBJECTIVE AND OBJECTIVE BOX
Date of Service 04-13-24 @ 11:17    CHIEF COMPLAINT:Patient is a 88y old  Female who presents with a chief complaint of AMS, hypoxia.Pt appears comfortable.    	  REVIEW OF SYSTEMS:  CONSTITUTIONAL: No fever, weight loss, or fatigue  EYES: No eye pain, visual disturbances, or discharge  ENT:  No difficulty hearing, tinnitus, vertigo; No sinus or throat pain  NECK: No pain or stiffness  RESPIRATORY: No cough, wheezing, chills or hemoptysis; No Shortness of Breath  CARDIOVASCULAR: No chest pain, palpitations, passing out, dizziness, or leg swelling  GASTROINTESTINAL: No abdominal or epigastric pain. No nausea, vomiting, or hematemesis; No diarrhea or constipation. No melena or hematochezia.  GENITOURINARY: No dysuria, frequency, hematuria, or incontinence  NEUROLOGICAL: No headaches, memory loss, loss of strength, numbness, or tremors  SKIN: No itching, burning, rashes, or lesions   LYMPH Nodes: No enlarged glands  ENDOCRINE: No heat or cold intolerance; No hair loss  MUSCULOSKELETAL: No joint pain or swelling; No muscle, back, or extremity pain  PSYCHIATRIC: No depression, anxiety, mood swings, or difficulty sleeping  HEME/LYMPH: No easy bruising, or bleeding gums  ALLERGY AND IMMUNOLOGIC: No hives or eczema	      PHYSICAL EXAM:  T(C): 36.8 (04-13-24 @ 07:49), Max: 36.9 (04-12-24 @ 16:04)  HR: 80 (04-13-24 @ 07:49) (80 - 95)  BP: 142/76 (04-13-24 @ 07:49) (142/76 - 156/88)  RR: 18 (04-13-24 @ 07:49) (18 - 19)  SpO2: 100% (04-13-24 @ 07:49) (94% - 100%)  Wt(kg): --  I&O's Summary    12 Apr 2024 07:01  -  13 Apr 2024 07:00  --------------------------------------------------------  IN: 0 mL / OUT: 650 mL / NET: -650 mL        Appearance: Normal	  HEENT:   Normal oral mucosa, PERRL, EOMI	  Lymphatic: No lymphadenopathy  Cardiovascular: Normal S1 S2, No JVD, No murmurs, No edema  Respiratory:B/L [facility]	  Psychiatry: A & O x 3, Mood & affect appropriate  Gastrointestinal:  Soft, Non-tender, + BS	  Skin: No rashes, No ecchymoses, No cyanosis	  Neurologic: Non-focal  Extremities: Normal range of motion, No clubbing, cyanosis or edema  Vascular: Peripheral pulses palpable 2+ bilaterally    MEDICATIONS  (STANDING):  albuterol/ipratropium for Nebulization 3 milliLiter(s) Nebulizer every 6 hours  aMIOdarone    Tablet 200 milliGRAM(s) Oral daily  aMIOdarone Infusion 0.5 mG/Min (16.7 mL/Hr) IV Continuous <Continuous>  apixaban 5 milliGRAM(s) Oral two times a day  artificial  tears Solution 1 Drop(s) Both EYES two times a day  ceFAZolin   IVPB 2000 milliGRAM(s) IV Intermittent every 8 hours  ceFAZolin   IVPB      furosemide    Tablet 20 milliGRAM(s) Oral daily  latanoprost 0.005% Ophthalmic Solution 1 Drop(s) Both EYES at bedtime  metoprolol tartrate 25 milliGRAM(s) Oral two times a day  mupirocin 2% Ointment 1 Application(s) Both Nostrils two times a day  pantoprazole    Tablet 40 milliGRAM(s) Oral before breakfast    	  	  LABS:	 	                                    11.1   16.29 )-----------( 359      ( 13 Apr 2024 06:13 )             33.5     04-13    147<H>  |  109<H>  |  12  ----------------------------<  123<H>  3.4<L>   |  33<H>  |  0.79    Ca    8.9      13 Apr 2024 06:13  Phos  2.9     04-13  Mg     2.2     04-13      proBNP:   Lipid Profile:   HgA1c:   TSH:

## 2024-04-14 NOTE — PHARMACOTHERAPY INTERVENTION NOTE - COMMENTS
Patient identified by [place] [facility] for pneumonia. Pertinent medications were reviewed and no additional interventions at this time.
Clarified diet order
ID: [id number]

## 2024-04-14 NOTE — PROGRESS NOTE ADULT - ASSESSMENT
Patient is a 88y old  Female who is from [facility] AAOX 1-2 at baseline with pmhx of Afib (on Eliquis), arthritis, HTN, CVA, PFO, Brain Aneurysm, Lung Cancer diagnosed five years ago, sent in to the ER on 4/6/24 for evaluation of hypoxia and AMS. On admission, she found ot have fever, tachycardia and Given 20mg IV lasix, 1mg ativan, Zosyn/ Vanc, 2LNS.   Patient became hypotensive required pressor, which has been weaned off, but Patient became severely hypercarbic requiring emergent intubation and admitted to ICU for acute hypercarbic respiratory failure and septic shock secondary to UTI and concern of aspiration PNA. Started on Zosyn. Urine culture growing E. coli. CT head negative for acute findings. Self extubated and passed SAT on 4/8, now breathing on 4 L NC. Course complicated by Afib w/ RVR with runs of NSVT on 4/9, started on Amiodarone drip, now rate controlled NSR. Holding home lopressor 25mg BID for hypotension, now off pressors.  Speech and Swallow evaluated patient and recommended pureed diet with thin liquids as she has history of esophageal protrusion on previous barium swallow study. Now The ID consult requested to assist with further antibiotic management.     # Severe Sepsis/Septic shock On admission ( fever + tachycardia + tachypnea +Hypoxia + Leukocytosis )- resolving   # S/P Hypercarbic respiratory failure - s/p Self extubation  # UTI - Ucx grew E.coli  # Aspiration pneumonia - MSSA PCR is positive    would recommend:    1. Monitor WBC count, started trending down  2. Continue Cefazolin since MSSA PCR is positive, until 4/18/24, may change to oral Abx on discharge  3. Supplemental oxygenation and Bronchodilator as needed  4. Aspiration precaution  5. PT/OOB to chair     Attending Attestation:    Spent more than 35 minutes on total encounter, more than 50 % of the visit was spent counseling and/or coordinating care by the Attending physician.

## 2024-04-14 NOTE — PROGRESS NOTE ADULT - SUBJECTIVE AND OBJECTIVE BOX
Date of Service 04-14-24 @ 11:18    CHIEF COMPLAINT:Patient is a 88y old  Female who presents with a chief complaint of AMS, hypoxia.Pt appears comfortable.    	  REVIEW OF SYSTEMS:  CONSTITUTIONAL: No fever, weight loss, or fatigue  EYES: No eye pain, visual disturbances, or discharge  ENT:  No difficulty hearing, tinnitus, vertigo; No sinus or throat pain  NECK: No pain or stiffness  RESPIRATORY: No cough, wheezing, chills or hemoptysis; No Shortness of Breath  CARDIOVASCULAR: No chest pain, palpitations, passing out, dizziness, or leg swelling  GASTROINTESTINAL: No abdominal or epigastric pain. No nausea, vomiting, or hematemesis; No diarrhea or constipation. No melena or hematochezia.  GENITOURINARY: No dysuria, frequency, hematuria, or incontinence  NEUROLOGICAL: No headaches, memory loss, loss of strength, numbness, or tremors  SKIN: No itching, burning, rashes, or lesions   LYMPH Nodes: No enlarged glands  ENDOCRINE: No heat or cold intolerance; No hair loss  MUSCULOSKELETAL: No joint pain or swelling; No muscle, back, or extremity pain  PSYCHIATRIC: No depression, anxiety, mood swings, or difficulty sleeping  HEME/LYMPH: No easy bruising, or bleeding gums  ALLERGY AND IMMUNOLOGIC: No hives or eczema	      PHYSICAL EXAM:  T(C): 36.9 (04-14-24 @ 11:12), Max: 37.2 (04-13-24 @ 21:06)  HR: 70 (04-14-24 @ 11:12) (70 - 86)  BP: 118/81 (04-14-24 @ 11:12) (118/81 - 166/84)  RR: 19 (04-14-24 @ 11:12) (18 - 19)  SpO2: 96% (04-14-24 @ 11:12) (96% - 100%)  Wt(kg): --  I&O's Summary    13 Apr 2024 07:01  -  14 Apr 2024 07:00  --------------------------------------------------------  IN: 0 mL / OUT: 400 mL / NET: -400 mL        Appearance: Normal	  HEENT:   Normal oral mucosa, PERRL, EOMI	  Lymphatic: No lymphadenopathy  Cardiovascular: Normal S1 S2, No JVD, No murmurs, No edema  Respiratory:Dec BS at bases  Psychiatry: A & O x 3, Mood & affect appropriate  Gastrointestinal:  Soft, Non-tender, + BS	  Skin: No rashes, No ecchymoses, No cyanosis	  Neurologic: Non-focal  Extremities: Normal range of motion, No clubbing, cyanosis or edema  Vascular: Peripheral pulses palpable 2+ bilaterally    MEDICATIONS  (STANDING):  albuterol/ipratropium for Nebulization 3 milliLiter(s) Nebulizer every 6 hours  aMIOdarone    Tablet 200 milliGRAM(s) Oral daily  aMIOdarone Infusion 0.5 mG/Min (16.7 mL/Hr) IV Continuous <Continuous>  apixaban 5 milliGRAM(s) Oral two times a day  artificial  tears Solution 1 Drop(s) Both EYES two times a day  ceFAZolin   IVPB 2000 milliGRAM(s) IV Intermittent every 8 hours  ceFAZolin   IVPB      furosemide    Tablet 20 milliGRAM(s) Oral daily  furosemide   Injectable 20 milliGRAM(s) IV Push once  latanoprost 0.005% Ophthalmic Solution 1 Drop(s) Both EYES at bedtime  metoprolol tartrate 25 milliGRAM(s) Oral two times a day  pantoprazole    Tablet 40 milliGRAM(s) Oral before breakfast        LABS:	 	                       11.2   14.84 )-----------( 417      ( 14 Apr 2024 06:11 )             35.3     04-14    147<H>  |  110<H>  |  12  ----------------------------<  113<H>  3.6   |  33<H>  |  0.76    Ca    8.6      14 Apr 2024 06:11  Phos  2.9     04-13  Mg     2.2     04-13      proBNP:   Lipid Profile:   HgA1c:   TSH:

## 2024-04-14 NOTE — PROGRESS NOTE ADULT - SUBJECTIVE AND OBJECTIVE BOX
Patient is seen and examined at the bed side, is afebrile. She mentioned having a better day.  The WBC count started trending down.       REVIEW OF SYSTEMS: All other review systems are negative      ALLERGIES: No Known Allergies      Vital Signs Last 24 Hrs  T(C): 37.1 (14 Apr 2024 15:39), Max: 37.2 (13 Apr 2024 21:06)  T(F): 98.8 (14 Apr 2024 15:39), Max: 99 (13 Apr 2024 21:06)  HR: 80 (14 Apr 2024 15:39) (70 - 86)  BP: 122/87 (14 Apr 2024 15:39) (118/81 - 166/84)  BP(mean): --  RR: 19 (14 Apr 2024 15:39) (18 - 19)  SpO2: 98% (14 Apr 2024 15:39) (96% - 100%)    Parameters below as of 14 Apr 2024 15:39  Patient On (Oxygen Delivery Method): nasal cannula  O2 Flow (L/min): 2      PHYSICAL EXAM:  GENERAL: Appears comfortable, on oxygen via NC  CHEST/LUNG:  Not using accessory muscles   HEART: s1 and s2 present  ABDOMEN:  Nontender and  Nondistended  EXTREMITIES: No pedal  edema  CNS: Awake and Alert      LABS:                        11.2   14.84 )-----------( 417      ( 14 Apr 2024 06:11 )             35.3                           11.1   16.29 )-----------( 359      ( 13 Apr 2024 06:13 )             33.5             04-14    147<H>  |  110<H>  |  12  ----------------------------<  113<H>  3.6   |  33<H>  |  0.76    Ca    8.6      14 Apr 2024 06:11  Phos  2.9     04-13  Mg     2.2     04-13      04-12    145  |  110<H>  |  13  ----------------------------<  115<H>  3.6   |  32<H>  |  0.84    Ca    9.1      12 Apr 2024 08:20    TPro  6.7  /  Alb  2.0<L>  /  TBili  1.0  /  DBili  x   /  AST  25  /  ALT  30  /  AlkPhos  160<H>  04-09      CAPILLARY BLOOD GLUCOSE  POCT Blood Glucose.: 126 mg/dL (09 Apr 2024 17:37)        MEDICATIONS  (STANDING):    albuterol/ipratropium for Nebulization 3 milliLiter(s) Nebulizer every 6 hours  aMIOdarone    Tablet 200 milliGRAM(s) Oral daily  aMIOdarone Infusion 0.5 mG/Min (16.7 mL/Hr) IV Continuous <Continuous>  apixaban 5 milliGRAM(s) Oral two times a day  artificial  tears Solution 1 Drop(s) Both EYES two times a day  ceFAZolin   IVPB 2000 milliGRAM(s) IV Intermittent every 8 hours  ceFAZolin   IVPB      latanoprost 0.005% Ophthalmic Solution 1 Drop(s) Both EYES at bedtime  metoprolol tartrate 25 milliGRAM(s) Oral two times a day  pantoprazole    Tablet 40 milliGRAM(s) Oral before breakfast        RADIOLOGY & ADDITIONAL TESTS:    4/8/24 : Xray Chest 1 View- PORTABLE-Routine (Xray Chest 1 View- PORTABLE-Routine in AM.) (04.08.24 @ 10:22) The lungs show less pulmonary congestion with similar or smaller   effusions and there is no evidence of pneumothorax.      4/7/24 : Xray Chest 1 View- PORTABLE-Routine (Xray Chest 1 View- PORTABLE-Routine in AM.) (04.07.24 @ 09:46) Lung Parenchyma/Pleura: Stable diffuse bilateral interstitial prominence,   likely due to emphysematous changes. Stable bibasilar opacities/effusions. No pneumothorax.      MICROBIOLOGY DATA:    Culture - Urine (04.06.24 @ 09:04)   - Trimethoprim/Sulfamethoxazole: S <=0.5/9.5  - Meropenem: S <=1  - Nitrofurantoin: S <=32 Should not be used to treat pyelonephritis  - Piperacillin/Tazobactam: S <=8  - Tobramycin: R 8  - Cefoxitin: S <=8  - Ceftriaxone: S <=1  - Cefuroxime: S <=4  - Ciprofloxacin: R >2  - Ertapenem: S <=0.5  - Gentamicin: R >8  - Imipenem: S <=1  - Levofloxacin: R >4  - Amoxicillin/Clavulanic Acid: S <=8/4 Consider reserving for cystitis when ampicillin/sulbactam is resistant  - Ampicillin: R >16 These ampicillin results predict results for amoxicillin  - Ampicillin/Sulbactam: R >16/8  - Aztreonam: S <=4  - Cefazolin: S 4 For uncomplicated UTI with K. pneumoniae, E. coli, or P. mirablis: EMILY <=16 is sensitive and EMILY >=32 is resistant. This also predicts results for oral agents cefaclor, cefdinir, cefpodoxime, cefprozil, cefuroxime axetil, cephalexin and locarbef for uncomplicated UTI. Note that some isolates may be susceptible to these agents while testing resistant to cefazolin.  - Cefepime: S <=2  Specimen Source: Clean Catch Clean Catch (Midstream)  Culture Results:   >100,000 CFU/ml Escherichia coli  Organism Identification: Escherichia coli  Organism: Escherichia coli  Method Type: EMILY  MRSA/MSSA PCR (04.08.24 @ 03:45)   MRSA PCR Result.: NotDetec    Culture - Sputum . (04.07.24 @ 14:00)   Gram Stain:   Few polymorphonuclear leukocytes per low power field   No Squamous epithelial cells per low power field   No organisms seen per oil power field  Specimen Source: ET Tube ET Tube  Culture Results:   Normal Respiratory Nette present

## 2024-04-14 NOTE — PROGRESS NOTE ADULT - ASSESSMENT
87 y/o female from [facility] AAOX 1-2 at baseline with pmhx of Parox Afib, arthritis, HTN, CVA, PFO, Brain Aneurysm, Lung Cancer diagnosed five years ago presenting to ED from nursing home for hypoxia,CO2 retention,respiratory failure and suspected sepsis.  1.Tele monitoring..  2.Gentle diuresis.  3.ABX for uti and  pneumonia until 4/18/24..  4.PAF-eliquis,,on amio, b blocker.  5.ID f/u.  6.PPI.  7.Pt is DNR/DNI.  8.PT rec [facility].

## 2024-04-15 NOTE — PROGRESS NOTE ADULT - ASSESSMENT
88 year old, female, from [facility] AAOX 1-2 at baseline with PMH of Afib (on Eliquis), arthritis, HTN, CVA, PFO, Brain Aneurysm, & Lung Cancer diagnosed five years ago.  Presented to ED from nursing home for hypoxia and AMS.      ED VS: T 97.6, , BP 99/52, SPo2 99% on 8LNRB. Given 20mg IV lasix, 1mg ativan, Zosyn/ Vanc, 2LNS. Patient became hypotensive and was started on peripheral Levophed, which has since been weaned off. Patient was severely hypercarbic requiring emergent intubation. Pt admitted to ICU for acute hypercarbic respiratory failure and septic shock secondary to UTI and concern of aspiration PNA. Started on Zosyn. Urine culture growing E. coli. CT head negative for acute findings. Self extubated and passed SAT on 4/8, now breathing on 4 L NC. Course complicated by Afib w/ RVR with runs of NSVT on 4/9, started on Amiodarone drip, now rate controlled NSR, and off Amiodarone drip.    Holding home Lopressor 25mg BID for hypotension.    Speech and Swallow evaluated patient and recommended pureed diet with thin liquids as she has history of esophageal protrusion on previous barium swallow study.

## 2024-04-15 NOTE — PROGRESS NOTE ADULT - SUBJECTIVE AND OBJECTIVE BOX
Date of Service 04-15-24 @ 09:55    CHIEF COMPLAINT:Patient is a 88y old  Female who presents with a chief complaint of AMS, hypoxia .Pt appears comfortable.    	  REVIEW OF SYSTEMS:  CONSTITUTIONAL: No fever, weight loss, or fatigue  EYES: No eye pain, visual disturbances, or discharge  ENT:  No difficulty hearing, tinnitus, vertigo; No sinus or throat pain  NECK: No pain or stiffness  RESPIRATORY: No cough, wheezing, chills or hemoptysis; No Shortness of Breath  CARDIOVASCULAR: No chest pain, palpitations, passing out, dizziness, or leg swelling  GASTROINTESTINAL: No abdominal or epigastric pain. No nausea, vomiting, or hematemesis; No diarrhea or constipation. No melena or hematochezia.  GENITOURINARY: No dysuria, frequency, hematuria, or incontinence  NEUROLOGICAL: No headaches, memory loss, loss of strength, numbness, or tremors  SKIN: No itching, burning, rashes, or lesions   LYMPH Nodes: No enlarged glands  ENDOCRINE: No heat or cold intolerance; No hair loss  MUSCULOSKELETAL: No joint pain or swelling; No muscle, back, or extremity pain  PSYCHIATRIC: No depression, anxiety, mood swings, or difficulty sleeping  HEME/LYMPH: No easy bruising, or bleeding gums  ALLERGY AND IMMUNOLOGIC: No hives or eczema	      PHYSICAL EXAM:  T(C): 37.1 (04-15-24 @ 07:47), Max: 37.1 (04-14-24 @ 15:39)  HR: 81 (04-15-24 @ 07:47) (70 - 85)  BP: 134/80 (04-15-24 @ 07:47) (118/81 - 160/89)  RR: 18 (04-15-24 @ 07:47) (18 - 19)  SpO2: 98% (04-15-24 @ 07:47) (96% - 100%)  Wt(kg): --  I&O's Summary      Appearance: Normal	  HEENT:   Normal oral mucosa, PERRL, EOMI	  Lymphatic: No lymphadenopathy  Cardiovascular: Normal S1 S2, No JVD, No murmurs, No edema  Respiratory: Lungs clear to auscultation	  Psychiatry: A & O x 3, Mood & affect appropriate  Gastrointestinal:  Soft, Non-tender, + BS	  Skin: No rashes, No ecchymoses, No cyanosis	  Neurologic: Non-focal  Extremities: Normal range of motion, No clubbing, cyanosis or edema  Vascular: Peripheral pulses palpable 2+ bilaterally    MEDICATIONS  (STANDING):  albuterol/ipratropium for Nebulization 3 milliLiter(s) Nebulizer every 6 hours  aMIOdarone    Tablet 200 milliGRAM(s) Oral daily  aMIOdarone Infusion 0.5 mG/Min (16.7 mL/Hr) IV Continuous <Continuous>  apixaban 5 milliGRAM(s) Oral two times a day  artificial  tears Solution 1 Drop(s) Both EYES two times a day  ceFAZolin   IVPB 2000 milliGRAM(s) IV Intermittent every 8 hours  ceFAZolin   IVPB      latanoprost 0.005% Ophthalmic Solution 1 Drop(s) Both EYES at bedtime  metoprolol tartrate 25 milliGRAM(s) Oral two times a day  pantoprazole    Tablet 40 milliGRAM(s) Oral before breakfast        LABS:	 	                        12.1   14.91 )-----------( 359      ( 15 Apr 2024 06:50 )             37.3     04-15    144  |  106  |  13  ----------------------------<  114<H>  5.3   |  33<H>  |  0.79    Ca    8.8      15 Apr 2024 06:50      proBNP:   Lipid Profile:   HgA1c:   TSH:

## 2024-04-15 NOTE — PROGRESS NOTE ADULT - PROBLEM SELECTOR PLAN 6
-DVT PPX: Eliquis for afib  -GI PPX: PPI
- C/w Eliquis for DVT ppx  - C/w Protonix for GI ppx
-Pt is from [place]  PT recs [facility]
-DVT PPX: Eliquis for afib  -GI PPX: PPI

## 2024-04-15 NOTE — PROGRESS NOTE ADULT - SUBJECTIVE AND OBJECTIVE BOX
NP Note discussed with  primary attending    Patient is a 88y old  Female who presents with a chief complaint of AMS, hypoxia (15 Apr 2024 14:14)      INTERVAL HPI/OVERNIGHT EVENTS: No new complaints.  Pt aroused from sleep by name calling.      MEDICATIONS  (STANDING):  albuterol/ipratropium for Nebulization 3 milliLiter(s) Nebulizer every 6 hours  aMIOdarone    Tablet 200 milliGRAM(s) Oral daily  aMIOdarone Infusion 0.5 mG/Min (16.7 mL/Hr) IV Continuous <Continuous>  apixaban 5 milliGRAM(s) Oral two times a day  artificial  tears Solution 1 Drop(s) Both EYES two times a day  ceFAZolin   IVPB      ceFAZolin   IVPB 2000 milliGRAM(s) IV Intermittent every 8 hours  latanoprost 0.005% Ophthalmic Solution 1 Drop(s) Both EYES at bedtime  metoprolol tartrate 25 milliGRAM(s) Oral two times a day  pantoprazole    Tablet 40 milliGRAM(s) Oral before breakfast    MEDICATIONS  (PRN):  acetaminophen     Tablet .. 650 milliGRAM(s) Oral every 6 hours PRN Mild Pain (1 - 3)  guaiFENesin Oral Liquid (Sugar-Free) 100 milliGRAM(s) Oral every 6 hours PRN Cough  sodium chloride 0.9% lock flush 10 milliLiter(s) IV Push every 1 hour PRN Pre/post blood products, medications, blood draw, and to maintain line patency      __________________________________________________  REVIEW OF SYSTEMS:    CONSTITUTIONAL: No fever  EYES: No acute visual disturbances  NECK: No pain or stiffness  RESPIRATORY: No cough; No shortness of breath  CARDIOVASCULAR: No chest pain, no palpitations  GASTROINTESTINAL: No pain. No nausea or vomiting.  No diarrhea   NEUROLOGICAL: No headache or numbness, no tremors  MUSCULOSKELETAL: No joint pain, no muscle pain  GENITOURINARY: No dysuria, no frequency, no hesitancy  PSYCHIATRY: No depression , no anxiety  ALL OTHER  ROS negative        Vital Signs Last 24 Hrs  T(C): 37 (15 Apr 2024 16:35), Max: 37.1 (14 Apr 2024 20:52)  T(F): 98.6 (15 Apr 2024 16:35), Max: 98.8 (14 Apr 2024 20:52)  HR: 78 (15 Apr 2024 16:35) (77 - 85)  BP: 114/77 (15 Apr 2024 16:35) (113/71 - 160/89)  RR: 18 (15 Apr 2024 16:35) (18 - 19)  SpO2: 100% (15 Apr 2024 16:35) (96% - 100%)    Parameters below as of 15 Apr 2024 16:35  Patient On (Oxygen Delivery Method): nasal cannula  O2 Flow (L/min): 2      ________________________________________________  PHYSICAL EXAM:  GENERAL: NAD  HEENT: Normocephalic;  conjunctivae and sclerae clear; moist mucous membranes   NECK : Supple  CHEST/LUNG: Clear to auscultation bilaterally with good air entry   HEART: S1 S2  regular; no murmurs, gallops or rubs  ABDOMEN: Soft, Nontender, Nondistended; Bowel sounds present x 4 quad  EXTREMITIES: No cyanosis; no edema; no calf tenderness  SKIN: Warm and dry; no rash  NERVOUS SYSTEM:  Awake and alert; Oriented to person, not place and time; no new deficits    _________________________________________________  LABS:                        12.1   14.91 )-----------( 359      ( 15 Apr 2024 06:50 )             37.3     04-15    144  |  106  |  13  ----------------------------<  114<H>  5.3   |  33<H>  |  0.79    Ca    8.8      15 Apr 2024 06:50        Urinalysis Basic - ( 15 Apr 2024 06:50 )    Color: x / Appearance: x / SG: x / pH: x  Gluc: 114 mg/dL / Ketone: x  / Bili: x / Urobili: x   Blood: x / Protein: x / Nitrite: x   Leuk Esterase: x / RBC: x / WBC x   Sq Epi: x / Non Sq Epi: x / Bacteria: x      CAPILLARY BLOOD GLUCOSE            RADIOLOGY & ADDITIONAL TESTS:    Imaging Personally Reviewed:  YES/NO    Consultant(s) Notes Reviewed:   YES/ No    Care Discussed with Consultants :     Plan of care was discussed with patient and /or primary care giver; all questions and concerns were addressed and care was aligned with patient's wishes.

## 2024-04-15 NOTE — PROGRESS NOTE ADULT - PROBLEM SELECTOR PLAN 5
H/o AFib w/ RVR.  Noted w/ runs of NSVT.   - S/ Amiodarone drip, now rate controlled NSR.  - C/w PO Amiodarone & Eliquis   - S/p Echo: EF 61%, G1DD, mod pulm HTN  - CV-Dr. Nation
-DVT PPX: Lovenox  -GI PPX: PPI
-Afib w/ RVR with runs of NSVT   -S/P Amiodarone drip, now rate controlled NSR.   -C/W PO Amiodarone  -Start Eliquis 5mg BID  -Echo: EF 61%, G1DD, mod pulm HTN  -Cardio Dr. Nation
-Afib w/ RVR with runs of NSVT   -S/P Amiodarone drip, now rate controlled NSR.   -C/W PO Amiodarone  - Start Eliquis 5mg BID  -Echo: EF 61%, G1DD, mod pulm HTN  -Cardio Dr. Nation

## 2024-04-15 NOTE — PROGRESS NOTE ADULT - ASSESSMENT
89 y/o female from [facility] AAOX 1-2 at baseline with pmhx of Parox Afib, arthritis, HTN, CVA, PFO, Brain Aneurysm, Lung Cancer diagnosed five years ago presenting to ED from nursing home for hypoxia,CO2 retention,respiratory failure and suspected sepsis.  1.Tele monitoring..  2.Gentle diuresis.  3.ABX for uti and  pneumonia until 4/18/24..  4.PAF-eliquis,,on amio, b blocker.  5.ID f/u.  6.PPI.  7.Pt is DNR/DNI.  8.PT rec HEMANT.

## 2024-04-15 NOTE — PROGRESS NOTE ADULT - ASSESSMENT
Patient is a 88y old  Female who is from [facility] AAOX 1-2 at baseline with pmhx of Afib (on Eliquis), arthritis, HTN, CVA, PFO, Brain Aneurysm, Lung Cancer diagnosed five years ago, sent in to the ER on 4/6/24 for evaluation of hypoxia and AMS. On admission, she found ot have fever, tachycardia and Given 20mg IV lasix, 1mg ativan, Zosyn/ Vanc, 2LNS.   Patient became hypotensive required pressor, which has been weaned off, but Patient became severely hypercarbic requiring emergent intubation and admitted to ICU for acute hypercarbic respiratory failure and septic shock secondary to UTI and concern of aspiration PNA. Started on Zosyn. Urine culture growing E. coli. CT head negative for acute findings. Self extubated and passed SAT on 4/8, now breathing on 4 L NC. Course complicated by Afib w/ RVR with runs of NSVT on 4/9, started on Amiodarone drip, now rate controlled NSR. Holding home lopressor 25mg BID for hypotension, now off pressors.  Speech and Swallow evaluated patient and recommended pureed diet with thin liquids as she has history of esophageal protrusion on previous barium swallow study. Now The ID consult requested to assist with further antibiotic management.     # Severe Sepsis/Septic shock On admission ( fever + tachycardia + tachypnea +Hypoxia + Leukocytosis )- resolving   # S/P Hypercarbic respiratory failure - s/p Self extubation  # UTI - Ucx grew E.coli  # Aspiration pneumonia - MSSA PCR is positive    would recommend:    1. Monitor WBC count, stay elevated   2. Continue Cefazolin since MSSA PCR is positive, until 4/18/24, may change to oral Augmentin 875mg q 12hours on discharge  3. Supplemental oxygenation and Bronchodilator as needed  4. Aspiration precaution  5. PT/OOB to chair     d/w covering NPManjit    Attending Attestation:    Spent more than 35 minutes on total encounter, more than 50 % of the visit was spent counseling and/or coordinating care by the Attending physician.

## 2024-04-15 NOTE — PROGRESS NOTE ADULT - PROBLEM SELECTOR PROBLEM 5
Chronic atrial fibrillation
Prophylactic measure

## 2024-04-15 NOTE — PROGRESS NOTE ADULT - PROBLEM SELECTOR PLAN 7
- Pt is from [place]  - PT rec HEMANT  - Will need STAR appt
-Pt is from [place]  PT recs HEMANT
-Pt is from [place]  -PT recs HEMANT  -D/C is pending clinical improvement

## 2024-04-15 NOTE — PROGRESS NOTE ADULT - PROBLEM SELECTOR PLAN 3
- See above
-septic shock secondary to UTI and concern of aspiration PNA.   -Started on Zosyn.   -Urine culture growing E. coli.   -ID Dr. Castro
-septic shock secondary to UTI and concern of aspiration PNA.   -Abx changed from Zosyn to Cefazolin as MSSA PCR +  -Urine culture growing E. coli.   -ID Dr. Castro
-septic shock secondary to UTI and concern of aspiration PNA.   -Abx changed from Zosyn to Cefazolin as MSSA PCR +  -Urine culture growing E. coli.   -ID Dr. Castro

## 2024-04-15 NOTE — PROGRESS NOTE ADULT - PROBLEM SELECTOR PROBLEM 1
Sepsis with acute hypercapnic respiratory failure

## 2024-04-15 NOTE — PROGRESS NOTE ADULT - PROBLEM SELECTOR PLAN 1
-Pt b/w hypoxia   -ABG showing respiratory acidosis  -Pt intubated in ED  -Repeat ABG showing improvement  -Self extubated and passed SAT on 4/8, now breathing on 4 L NC.  -Pt failed SBT 4/7  - patient self extubated on 4/8 and now monitoring on NC
- P/w Hypoxia   - S/p ABG showed respiratory acidosis  - S/p intubated in ED.  Self extubated and passed SAT on 4/8  - S/p Repeat ABG showing improvement  - Currently on NC
-Pt b/w hypoxia   -ABG showing respiratory acidosis  -Pt intubated in ED  -Repeat ABG showing improvement  -Self extubated and passed SAT on 4/8, now breathing on 4 L NC.  -Pt failed SBT 4/7  -patient self extubated on 4/8 and now monitoring on NC
-Pt b/w hypoxia   -ABG showing respiratory acidosis  -Pt intubated in ED  -Repeat ABG showing improvement  -Self extubated and passed SAT on 4/8, now breathing on 4 L NC.  -Pt failed SBT 4/7  - patient self extubated on 4/8 and now monitoring on NC

## 2024-04-15 NOTE — PROGRESS NOTE ADULT - PROBLEM SELECTOR PLAN 2
- P/w Septic shock 2/2 E. Coli UTI and MSSA PNA  - S/p Zosyn.   - Currently on Cefazolin   - ID-Dr. Castro
-septic shock secondary to UTI and concern of aspiration PNA.   -Started on Zosyn.   -Urine culture growing E. coli.   -ID Dr. Castro
-septic shock secondary to UTI and concern of aspiration PNA.   -Zosyn changed to Cefazolin since MSSA PCR +  -Urine culture growing E. coli.   -ID Dr. Castro
-septic shock secondary to UTI and concern of aspiration PNA.   -Started on Zosyn.   -Urine culture growing E. coli.   -ID Dr. Castro

## 2024-04-15 NOTE — PROGRESS NOTE ADULT - SUBJECTIVE AND OBJECTIVE BOX
Patient is seen and examined at the bed side, is afebrile. She is tolerating cefazolin well.      REVIEW OF SYSTEMS: All other review systems are negative      ALLERGIES: No Known Allergies      Vital Signs Last 24 Hrs  T(C): 37 (15 Apr 2024 16:35), Max: 37.1 (14 Apr 2024 20:52)  T(F): 98.6 (15 Apr 2024 16:35), Max: 98.8 (14 Apr 2024 20:52)  HR: 78 (15 Apr 2024 16:35) (77 - 85)  BP: 114/77 (15 Apr 2024 16:35) (113/71 - 160/89)  BP(mean): --  RR: 18 (15 Apr 2024 16:35) (18 - 19)  SpO2: 100% (15 Apr 2024 16:35) (96% - 100%)    Parameters below as of 15 Apr 2024 16:35  Patient On (Oxygen Delivery Method): nasal cannula  O2 Flow (L/min): 2      PHYSICAL EXAM:  GENERAL: Appears comfortable, on oxygen via NC  CHEST/LUNG:  Not using accessory muscles   HEART: s1 and s2 present  ABDOMEN:  Nontender and  Nondistended  EXTREMITIES: No pedal  edema  CNS: Awake and Alert      LABS:                        12.1   14.91 )-----------( 359      ( 15 Apr 2024 06:50 )             37.3                           11.1   16.29 )-----------( 359      ( 13 Apr 2024 06:13 )             33.5           04-15    144  |  106  |  13  ----------------------------<  114<H>  5.3   |  33<H>  |  0.79    Ca    8.8      15 Apr 2024 06:50      04-14    147<H>  |  110<H>  |  12  ----------------------------<  113<H>  3.6   |  33<H>  |  0.76    Ca    8.6      14 Apr 2024 06:11  Phos  2.9     04-13  Mg     2.2     04-13    TPro  6.7  /  Alb  2.0<L>  /  TBili  1.0  /  DBili  x   /  AST  25  /  ALT  30  /  AlkPhos  160<H>  04-09      CAPILLARY BLOOD GLUCOSE  POCT Blood Glucose.: 126 mg/dL (09 Apr 2024 17:37)        MEDICATIONS  (STANDING):    albuterol/ipratropium for Nebulization 3 milliLiter(s) Nebulizer every 6 hours  aMIOdarone    Tablet 200 milliGRAM(s) Oral daily  aMIOdarone Infusion 0.5 mG/Min (16.7 mL/Hr) IV Continuous <Continuous>  apixaban 5 milliGRAM(s) Oral two times a day  artificial  tears Solution 1 Drop(s) Both EYES two times a day  ceFAZolin   IVPB      ceFAZolin   IVPB 2000 milliGRAM(s) IV Intermittent every 8 hours  latanoprost 0.005% Ophthalmic Solution 1 Drop(s) Both EYES at bedtime  metoprolol tartrate 25 milliGRAM(s) Oral two times a day  pantoprazole    Tablet 40 milliGRAM(s) Oral before breakfast        RADIOLOGY & ADDITIONAL TESTS:    4/8/24 : Xray Chest 1 View- PORTABLE-Routine (Xray Chest 1 View- PORTABLE-Routine in AM.) (04.08.24 @ 10:22) The lungs show less pulmonary congestion with similar or smaller   effusions and there is no evidence of pneumothorax.      4/7/24 : Xray Chest 1 View- PORTABLE-Routine (Xray Chest 1 View- PORTABLE-Routine in AM.) (04.07.24 @ 09:46) Lung Parenchyma/Pleura: Stable diffuse bilateral interstitial prominence,   likely due to emphysematous changes. Stable bibasilar opacities/effusions. No pneumothorax.      MICROBIOLOGY DATA:    Culture - Urine (04.06.24 @ 09:04)   - Trimethoprim/Sulfamethoxazole: S <=0.5/9.5  - Meropenem: S <=1  - Nitrofurantoin: S <=32 Should not be used to treat pyelonephritis  - Piperacillin/Tazobactam: S <=8  - Tobramycin: R 8  - Cefoxitin: S <=8  - Ceftriaxone: S <=1  - Cefuroxime: S <=4  - Ciprofloxacin: R >2  - Ertapenem: S <=0.5  - Gentamicin: R >8  - Imipenem: S <=1  - Levofloxacin: R >4  - Amoxicillin/Clavulanic Acid: S <=8/4 Consider reserving for cystitis when ampicillin/sulbactam is resistant  - Ampicillin: R >16 These ampicillin results predict results for amoxicillin  - Ampicillin/Sulbactam: R >16/8  - Aztreonam: S <=4  - Cefazolin: S 4 For uncomplicated UTI with K. pneumoniae, E. coli, or P. mirablis: EMILY <=16 is sensitive and EMILY >=32 is resistant. This also predicts results for oral agents cefaclor, cefdinir, cefpodoxime, cefprozil, cefuroxime axetil, cephalexin and locarbef for uncomplicated UTI. Note that some isolates may be susceptible to these agents while testing resistant to cefazolin.  - Cefepime: S <=2  Specimen Source: Clean Catch Clean Catch (Midstream)  Culture Results:   >100,000 CFU/ml Escherichia coli  Organism Identification: Escherichia coli  Organism: Escherichia coli  Method Type: EMILY  MRSA/MSSA PCR (04.08.24 @ 03:45)   MRSA PCR Result.: NotDetec    Culture - Sputum . (04.07.24 @ 14:00)   Gram Stain:   Few polymorphonuclear leukocytes per low power field   No Squamous epithelial cells per low power field   No organisms seen per oil power field  Specimen Source: ET Tube ET Tube  Culture Results:   Normal Respiratory Nette present

## 2024-04-15 NOTE — PROGRESS NOTE ADULT - PROBLEM SELECTOR PLAN 4
-Afib w/ RVR with runs of NSVT on 4/9,  -S/P Amiodarone drip, now rate controlled NSR.   -Now on PO Amiodarone  -Echo: EF 61%, G1DD, mod pulm HTN  -Cardio Dr. Nation
- See above
- septic shock secondary to UTI and concern of aspiration PNA.  -Abx changed from Zosyn to Cefazolin as MSSA PCR +  - ID Dr. Castro
- septic shock secondary to UTI and concern of aspiration PNA.  -Abx changed from Zosyn to Cefazolin as MSSA PCR +  - ID Dr. Castro

## 2024-04-16 NOTE — PROGRESS NOTE ADULT - PROVIDER SPECIALTY LIST ADULT
Critical Care
Infectious Disease
Infectious Disease
Internal Medicine
Internal Medicine
Cardiology
Critical Care
Infectious Disease
Internal Medicine
Critical Care
Internal Medicine
Cardiology
Cardiology
Internal Medicine

## 2024-04-16 NOTE — PROGRESS NOTE ADULT - REASON FOR ADMISSION
AMS, hypoxia

## 2024-04-16 NOTE — PROGRESS NOTE ADULT - SUBJECTIVE AND OBJECTIVE BOX
Patient is seen and examined at the bed side, is afebrile. She has no new complaints. The discharge plan noted.      REVIEW OF SYSTEMS: All other review systems are negative      ALLERGIES: No Known Allergies      Vital Signs Last 24 Hrs  T(C): 36.2 (16 Apr 2024 16:01), Max: 37 (15 Apr 2024 20:30)  T(F): 97.2 (16 Apr 2024 16:01), Max: 98.6 (15 Apr 2024 20:30)  HR: 81 (16 Apr 2024 16:01) (69 - 82)  BP: 126/77 (16 Apr 2024 16:01) (120/84 - 128/77)  BP(mean): --  RR: 18 (16 Apr 2024 16:01) (18 - 18)  SpO2: 97% (16 Apr 2024 16:01) (97% - 100%)    Parameters below as of 16 Apr 2024 16:01  Patient On (Oxygen Delivery Method): nasal cannula  O2 Flow (L/min): 2        PHYSICAL EXAM:  GENERAL: Appears comfortable, on oxygen via NC  CHEST/LUNG:  Not using accessory muscles   HEART: s1 and s2 present  ABDOMEN:  Nontender and  Nondistended  EXTREMITIES: No pedal  edema  CNS: Awake and Alert      LABS:                        11.2   14.45 )-----------( 479      ( 16 Apr 2024 07:09 )             35.0                           12.1   14.91 )-----------( 359      ( 15 Apr 2024 06:50 )             37.3              04-16    147<H>  |  107  |  12  ----------------------------<  114<H>  3.5   |  38<H>  |  0.79    Ca    8.5      16 Apr 2024 07:09          04-15    144  |  106  |  13  ----------------------------<  114<H>  5.3   |  33<H>  |  0.79    Ca    8.8      15 Apr 2024 06:50    TPro  6.7  /  Alb  2.0<L>  /  TBili  1.0  /  DBili  x   /  AST  25  /  ALT  30  /  AlkPhos  160<H>  04-09      CAPILLARY BLOOD GLUCOSE  POCT Blood Glucose.: 126 mg/dL (09 Apr 2024 17:37)        MEDICATIONS  (STANDING):    albuterol/ipratropium for Nebulization 3 milliLiter(s) Nebulizer every 6 hours  aMIOdarone    Tablet 200 milliGRAM(s) Oral daily  aMIOdarone Infusion 0.5 mG/Min (16.7 mL/Hr) IV Continuous <Continuous>  apixaban 5 milliGRAM(s) Oral two times a day  artificial  tears Solution 1 Drop(s) Both EYES two times a day  ceFAZolin   IVPB 2000 milliGRAM(s) IV Intermittent every 8 hours  ceFAZolin   IVPB      latanoprost 0.005% Ophthalmic Solution 1 Drop(s) Both EYES at bedtime  metoprolol tartrate 25 milliGRAM(s) Oral two times a day  pantoprazole    Tablet 40 milliGRAM(s) Oral before breakfast        RADIOLOGY & ADDITIONAL TESTS:    4/8/24 : Xray Chest 1 View- PORTABLE-Routine (Xray Chest 1 View- PORTABLE-Routine in AM.) (04.08.24 @ 10:22) The lungs show less pulmonary congestion with similar or smaller   effusions and there is no evidence of pneumothorax.      4/7/24 : Xray Chest 1 View- PORTABLE-Routine (Xray Chest 1 View- PORTABLE-Routine in AM.) (04.07.24 @ 09:46) Lung Parenchyma/Pleura: Stable diffuse bilateral interstitial prominence,   likely due to emphysematous changes. Stable bibasilar opacities/effusions. No pneumothorax.      MICROBIOLOGY DATA:    Culture - Urine (04.06.24 @ 09:04)   - Trimethoprim/Sulfamethoxazole: S <=0.5/9.5  - Meropenem: S <=1  - Nitrofurantoin: S <=32 Should not be used to treat pyelonephritis  - Piperacillin/Tazobactam: S <=8  - Tobramycin: R 8  - Cefoxitin: S <=8  - Ceftriaxone: S <=1  - Cefuroxime: S <=4  - Ciprofloxacin: R >2  - Ertapenem: S <=0.5  - Gentamicin: R >8  - Imipenem: S <=1  - Levofloxacin: R >4  - Amoxicillin/Clavulanic Acid: S <=8/4 Consider reserving for cystitis when ampicillin/sulbactam is resistant  - Ampicillin: R >16 These ampicillin results predict results for amoxicillin  - Ampicillin/Sulbactam: R >16/8  - Aztreonam: S <=4  - Cefazolin: S 4 For uncomplicated UTI with K. pneumoniae, E. coli, or P. mirablis: EMILY <=16 is sensitive and EMILY >=32 is resistant. This also predicts results for oral agents cefaclor, cefdinir, cefpodoxime, cefprozil, cefuroxime axetil, cephalexin and locarbef for uncomplicated UTI. Note that some isolates may be susceptible to these agents while testing resistant to cefazolin.  - Cefepime: S <=2  Specimen Source: Clean Catch Clean Catch (Midstream)  Culture Results:   >100,000 CFU/ml Escherichia coli  Organism Identification: Escherichia coli  Organism: Escherichia coli  Method Type: EMILY  MRSA/MSSA PCR (04.08.24 @ 03:45)   MRSA PCR Result.: NotDetec    Culture - Sputum . (04.07.24 @ 14:00)   Gram Stain:   Few polymorphonuclear leukocytes per low power field   No Squamous epithelial cells per low power field   No organisms seen per oil power field  Specimen Source: ET Tube ET Tube  Culture Results:   Normal Respiratory Nette present

## 2024-04-16 NOTE — PROGRESS NOTE ADULT - SUBJECTIVE AND OBJECTIVE BOX
Date of Service 04-16-24 @ 10:49    CHIEF COMPLAINT:Patient is a 88y old  Female who presents with a chief complaint of AMS, hypoxia.Pt appears comfortable.    	  REVIEW OF SYSTEMS:  CONSTITUTIONAL: No fever, weight loss, or fatigue  EYES: No eye pain, visual disturbances, or discharge  ENT:  No difficulty hearing, tinnitus, vertigo; No sinus or throat pain  NECK: No pain or stiffness  RESPIRATORY: No cough, wheezing, chills or hemoptysis; No Shortness of Breath  CARDIOVASCULAR: No chest pain, palpitations, passing out, dizziness, or leg swelling  GASTROINTESTINAL: No abdominal or epigastric pain. No nausea, vomiting, or hematemesis; No diarrhea or constipation. No melena or hematochezia.  GENITOURINARY: No dysuria, frequency, hematuria, or incontinence  NEUROLOGICAL: No headaches, memory loss, loss of strength, numbness, or tremors  SKIN: No itching, burning, rashes, or lesions   LYMPH Nodes: No enlarged glands  ENDOCRINE: No heat or cold intolerance; No hair loss  MUSCULOSKELETAL: No joint pain or swelling; No muscle, back, or extremity pain  PSYCHIATRIC: No depression, anxiety, mood swings, or difficulty sleeping  HEME/LYMPH: No easy bruising, or bleeding gums  ALLERGY AND IMMUNOLOGIC: No hives or eczema	        PHYSICAL EXAM:  T(C): 36.8 (04-16-24 @ 07:52), Max: 37.1 (04-15-24 @ 11:52)  HR: 72 (04-16-24 @ 07:52) (69 - 82)  BP: 128/77 (04-16-24 @ 07:52) (113/71 - 128/77)  RR: 18 (04-16-24 @ 07:52) (18 - 18)  SpO2: 98% (04-16-24 @ 07:52) (97% - 100%)  Wt(kg): --  I&O's Summary    15 Apr 2024 07:01  -  16 Apr 2024 07:00  --------------------------------------------------------  IN: 1100 mL / OUT: 800 mL / NET: 300 mL        Appearance: Normal	  HEENT:   Normal oral mucosa, PERRL, EOMI	  Lymphatic: No lymphadenopathy  Cardiovascular: Normal S1 S2, No JVD, No murmurs, No edema  Respiratory: Lungs clear to auscultation	  Psychiatry: A & O x 3, Mood & affect appropriate  Gastrointestinal:  Soft, Non-tender, + BS	  Skin: No rashes, No ecchymoses, No cyanosis	  Neurologic: Non-focal  Extremities: Normal range of motion, No clubbing, cyanosis or edema  Vascular: Peripheral pulses palpable 2+ bilaterally    MEDICATIONS  (STANDING):  albuterol/ipratropium for Nebulization 3 milliLiter(s) Nebulizer every 6 hours  aMIOdarone    Tablet 200 milliGRAM(s) Oral daily  aMIOdarone Infusion 0.5 mG/Min (16.7 mL/Hr) IV Continuous <Continuous>  apixaban 5 milliGRAM(s) Oral two times a day  artificial  tears Solution 1 Drop(s) Both EYES two times a day  ceFAZolin   IVPB 2000 milliGRAM(s) IV Intermittent every 8 hours  ceFAZolin   IVPB      latanoprost 0.005% Ophthalmic Solution 1 Drop(s) Both EYES at bedtime  metoprolol tartrate 25 milliGRAM(s) Oral two times a day  pantoprazole    Tablet 40 milliGRAM(s) Oral before breakfast      LABS:	 	                        11.2   14.45 )-----------( 479      ( 16 Apr 2024 07:09 )             35.0     04-16    147<H>  |  107  |  12  ----------------------------<  114<H>  3.5   |  38<H>  |  0.79    Ca    8.5      16 Apr 2024 07:09

## 2024-04-16 NOTE — DISCHARGE NOTE NURSING/CASE MANAGEMENT/SOCIAL WORK - PATIENT PORTAL LINK FT
You can access the FollowMyHealth Patient Portal offered by [facility] by registering at the following website: [email or web address]. By joining [facility]’s FollowMyHealth portal, you will also be able to view your health information using other applications (apps) compatible with our system.

## 2024-04-16 NOTE — DISCHARGE NOTE NURSING/CASE MANAGEMENT/SOCIAL WORK - NSDCPEFALRISK_GEN_ALL_CORE
For information on Fall & Injury Prevention, visit: [email or web address] OR  [email or web address] OR  https://www.cdc.gov/steadi/patient.html

## 2024-04-16 NOTE — PROGRESS NOTE ADULT - ASSESSMENT
Patient is a 88y old  Female who is from [facility] AAOX 1-2 at baseline with pmhx of Afib (on Eliquis), arthritis, HTN, CVA, PFO, Brain Aneurysm, Lung Cancer diagnosed five years ago, sent in to the ER on 4/6/24 for evaluation of hypoxia and AMS. On admission, she found ot have fever, tachycardia and Given 20mg IV lasix, 1mg ativan, Zosyn/ Vanc, 2LNS.   Patient became hypotensive required pressor, which has been weaned off, but Patient became severely hypercarbic requiring emergent intubation and admitted to ICU for acute hypercarbic respiratory failure and septic shock secondary to UTI and concern of aspiration PNA. Started on Zosyn. Urine culture growing E. coli. CT head negative for acute findings. Self extubated and passed SAT on 4/8, now breathing on 4 L NC. Course complicated by Afib w/ RVR with runs of NSVT on 4/9, started on Amiodarone drip, now rate controlled NSR. Holding home lopressor 25mg BID for hypotension, now off pressors.  Speech and Swallow evaluated patient and recommended pureed diet with thin liquids as she has history of esophageal protrusion on previous barium swallow study. Now The ID consult requested to assist with further antibiotic management.     # Severe Sepsis/Septic shock On admission ( fever + tachycardia + tachypnea +Hypoxia + Leukocytosis )- resolving   # S/P Hypercarbic respiratory failure - s/p Self extubation  # UTI - Ucx grew E.coli  # Aspiration pneumonia - MSSA PCR is positive    would recommend:    1. PT/OOB to chair   2. Continue Cefazolin since MSSA PCR is positive, until 4/18/24, may change to oral Augmentin 875mg q 12hours on discharge  3. Supplemental oxygenation and Bronchodilator as needed  4. Aspiration precaution  5.  Monitor WBC count, stay mildly elevated     d/w covering Manjit JASSO    Attending Attestation:    Spent more than 35 minutes on total encounter, more than 50 % of the visit was spent counseling and/or coordinating care by the Attending physician.

## 2024-04-16 NOTE — CHART NOTE - NSCHARTNOTEFT_GEN_A_CORE
NP called Mg Ritchie and provided update and informed pt was returning to [facility] today.  Desire verbalized understanding and in agreement.

## 2024-04-16 NOTE — PROGRESS NOTE ADULT - ASSESSMENT
87 y/o female from [facility] AAOX 1-2 at baseline with pmhx of Parox Afib, arthritis, HTN, CVA, PFO, Brain Aneurysm, Lung Cancer diagnosed five years ago presenting to ED from nursing home for hypoxia,CO2 retention,respiratory failure and suspected sepsis.  1.D/C back to [facility].  2.Gentle diuresis.  3.ABX for uti and  pneumonia until 4/18/24..  4.PAF-eliquis,,on amio, b blocker.  5.ID f/u.  6.PPI.  7.Pt is DNR/DNI.  \

## 2024-04-16 NOTE — CHART NOTE - NSCHARTNOTESELECT_GEN_ALL_CORE
Event Note
Medical attending/Event Note
central line discontinued/Event Note
ICU downgrade/Transfer Note
Internal Medicine/Event Note
family update/Event Note

## 2024-04-20 NOTE — H&P ADULT - PROBLEM SELECTOR PLAN 3
Likely in the setting of advance disease and severe hypercapnia  Hx as above   Initial workup noted  Patient now comfort measures only  Ativan PRN  Rubinol PRN  Dilaudid PRN  Palliative consulted

## 2024-04-20 NOTE — H&P ADULT - PROBLEM SELECTOR PLAN 1
DNR/DNI with comfort measures only.  Discussion as above  Ativan PRN  Rubinol PRN  Dilaudid PRN  Palliative consulted

## 2024-04-20 NOTE — H&P ADULT - ASSESSMENT
An 88 year old female from [facility], AAOX 1-2 at baseline, with PMH of AFib (on Eliquis), Arthritis, HTN, CVA, PFO, Brain Aneurysm, and Lung Cancer diagnosed five years ago, was brought into the ED due to AMS for the last 2 days. Found to have severe hypercapnia. Admitted to medicine for acute hypoxic and hypercapnic respiratory failure. Now comfort measures only.

## 2024-04-20 NOTE — ED PROVIDER NOTE - OBJECTIVE STATEMENT
88-year-old sent from nursing home for altered mental status patient responsive to tactile stimuli but unable to provide further history

## 2024-04-20 NOTE — ED ADULT NURSE NOTE - CHPI ED NUR DURATION
Airway    Performed by: Ned Rodriguez CRNA  Authorized by: Edmund Santoyo MD    Final Airway Type:  Supraglottic airway  Final Supraglottic Airway:  Classic  SGA Size*:  3  Attempts*:  1  Ventilation Between Attempts:  None  Number of Other Approaches Attempted:  0   Patient Identified, Procedure confirmed, Emergency equipment available and Safety protocols followed  Location:  OR  Urgency:  Elective  Difficult Airway: No    Indications for Airway Management:  Anesthesia  Spontaneous Ventilation: absent    Sedation Level:  Anesthetized  Manual In-line Stabilization Maintained Throughout: Yes    Mask Difficulty Assessment:  1 - vent by mask  Start Time: 6/30/2023 7:43 AM       day(s)

## 2024-04-20 NOTE — ED ADULT NURSE NOTE - NSFALLHARMRISKINTERV_ED_ALL_ED

## 2024-04-20 NOTE — H&P ADULT - ATTENDING COMMENTS
88 year old female from [facility], AAOX 1-2 at baseline, with PMH of AFib (on Eliquis), Arthritis, HTN, CVA, PFO, Brain Aneurysm, and Lung Cancer diagnosed five years ago, was brought into the ED due to AMS for the last 2 days,acute on chronic hypercapnic respiratory failure.  1.Pt is DNR and DNI, HCP confirmed only want comfort care.  2.Await palliative eval for hospice.  3.Supportive care.

## 2024-04-20 NOTE — ED ADULT NURSE NOTE - HISTORY OF COVID-19 VACCINATION
Vaccine status unknown tsh mildly elevated rest of labs no acute findings pt feeling better hr improved   Reevaluated patient at bedside.  Patient feeling much improved.  Discussed the results of all diagnostic testing in ED and copies of all available reports given.   An opportunity to ask questions was provided.  Discussed the importance of prompt, close medical follow-up.  Patient will return with any changes, concerns or persistent/worsening symptoms.  Understanding of all instructions verbalized.  advised fluids rest likely dehydration

## 2024-04-20 NOTE — ED PROVIDER NOTE - COVID-19  TEST TYPE
Patient : Adan Franco Age: 72 year old Sex: male   MRN: [id number] Encounter Date: 12/22/2021      History     Chief Complaint   Patient presents with   • Foot Pain     Pt had right plantar wart removed a month ago by Dr. Zambrano.  Pt said just over the few days that he started walking more and irritating the area.  Now it has some bruises it.    The history is provided by the patient and the spouse.   Foot Injury   The incident occurred more than 2 days ago. The incident occurred at home. There was no injury mechanism. The pain is mild. The symptoms are aggravated by activity and bearing weight. He has tried nothing for the symptoms.       Allergies   Allergen Reactions   • Statins MYALGIA       Current Discharge Medication List      Prior to Admission Medications    Details   metoPROLOL tartrate (LOPRESSOR) 25 MG tablet Take 1 tablet by mouth 2 times daily.  Qty: 180 tablet, Refills: 2      cilostazol (PLETAL) 50 MG tablet TAKE 1 TABLET TWICE A DAY  Qty: 180 tablet, Refills: 2      clopidogrel (PLAVIX) 75 MG tablet TAKE 1 TABLET DAILY  Qty: 90 tablet, Refills: 2      rosuvastatin (CRESTOR) 5 MG tablet TAKE 1 TABLET DAILY  Qty: 90 tablet, Refills: 1      tamsulosin (FLOMAX) 0.4 MG Cap Take 1 capsule by mouth daily after a meal.  Qty: 90 capsule, Refills: 3      metFORMIN (GLUCOPHAGE) 500 MG tablet TAKE 1 TABLET TWICE DAILY  WITH MEALS  Qty: 180 tablet, Refills: 3      Cholecalciferol (Vitamin D3) 1.25 mg (50,000 units) capsule Take 1 capsule by mouth every 7 days.  Qty: 4 capsule, Refills: 6      amoxicillin (AMOXIL) 500 MG capsule Take 500 mg by mouth as needed. As needed for dental procedures.             Past Medical History:   Diagnosis Date   • Acute MI (CMS/HCC) 2003   • Arteriosclerotic heart disease    • Basal cell cancer     left neck   • Blood clot associated with vein wall inflammation    • Diabetes mellitus (CMS/HCC)    • ED (erectile dysfunction)    • Epididymitis    • Esophageal reflux    • Essential  (primary) hypertension    • Failed moderate sedation during procedure     sensative to medication, slow to wake up   • Long term current use of systemic steroids 6/22/2020   • Nephrolithiasis    • Osteoarthritis    • Other and unspecified hyperlipidemia    • Other chronic pain     back   • PMR (polymyalgia rheumatica) (CMS/HCC) 5/8/2020   • 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)    • Wears glasses        Past Surgical History:   Procedure Laterality Date   • ARTHROSCOPY KNEE MEDIAL OR LAT  02/08/2008   • CORONARY ANGIOPLASTY WITH STENT PLACEMENT  2002    X2 LAD   • CORONARY ARTERY BYPASS GRAFT      2 vessel-9/13   • INJECTION (IA)      back injection    • JOINT REPLACEMENT  5-4-2011, 8-    bilateral TKR   • KNEE ARTHROSCOPY W/ MENISCECTOMY  06/24/2005   • REPR SUPERF WND BODY 2.5CM OR LESS  12/01/2009   • SERVICE TO GASTROENTEROLOGY      colonoscopy   • SERVICE TO GASTROENTEROLOGY  12-    esophagogastroduodenoscopy   • TONSILLECTOMY AND ADENOIDECTOMY         Family History   Problem Relation Age of Onset   • High blood pressure Mother    • Dementia/Alzheimers Mother    • Depression Father 39        suicide       Social History     Tobacco Use   • Smoking status: Former Smoker   • Smokeless tobacco: Never Used   Vaping Use   • Vaping Use: never used   Substance Use Topics   • Alcohol use: Yes     Alcohol/week: 0.0 standard drinks     Comment: couple times month   • Drug use: No       E-cigarette/Vaping   • E-Cigarette/Vaping Use Never Used      E-Cigarette/Vaping Substances & Devices   • Nicotine No    • THC No    • CBD No    • Flavoring No    • Disposable No    • Pre-filled or Refillable Cartridge No    • Refillable Tank No    • Pre-filled Pod No        Review of Systems   Constitutional: Negative for chills and fever.   Respiratory: Negative for cough and shortness of breath.    Cardiovascular: Negative for chest pain and leg swelling.   Skin: Positive for wound.        Physical Exam     ED Triage Vitals [12/22/21 1453]   ED Triage Vitals Group      Temp 98.2 °F (36.8 °C)      Heart Rate 80      Resp 16      BP (!) 140/68      SpO2 98 %      EtCO2 mmHg       Height       Weight       Weight Scale Used       BMI (Calculated)       IBW/kg (Calculated)        Physical Exam  Vitals and nursing note reviewed.   Constitutional:       Appearance: Normal appearance.   Cardiovascular:      Rate and Rhythm: Normal rate and regular rhythm.      Pulses: Normal pulses.      Heart sounds: Normal heart sounds.   Pulmonary:      Effort: Pulmonary effort is normal.      Breath sounds: Normal breath sounds.   Abdominal:      General: Abdomen is flat.      Palpations: Abdomen is soft.   Musculoskeletal:         General: Tenderness (Over the right base of the right big toe.  There is a fluctuant area of ecchymoses, consistent with a blood blister.) present. Normal range of motion.   Skin:     General: Skin is warm and dry.   Neurological:      Mental Status: He is alert.         ED Course       Radiology, Images     XR Foot 3+ View Right   Final Result   IMPRESSION:   No acute findings.                    I have reviewed radiology images and impressions, nothing acute noted, pending radiology final read.    ED Course  Discussed with patient that this is all due to a blood blister, and that there is no acute intervention.  CV to stay off of his feet, and he needs to keep it protected.  Patient can follow up with his foot doctor in a few days.      MDM  Differential diagnoses  1. Blood blister    Diagnosis  The encounter diagnosis was Blood blister.    Follow Up:  Valentino Zambrano DPM  [address]  [address]  [facility] [id number] [phone]    Call in 1 week  Try to avoid bearing weight, and use foam ring to protect the wound.         ED Medications   ED Medication Orders (From admission, onward)    None               Brissa Casper MD  12/22/21 3773     MOLECULAR PCR

## 2024-04-20 NOTE — ED PROVIDER NOTE - NSTIMEPROVIDERCAREINITIATE_GEN_ER
Routine EEG 9/3/22  Indication:  4 month old with suspected seizures    Medications: None listed    Technique: This is a 21-channel EEG recording done in the drowsy and asleep states. A digital recording was obtained placing electrodes utilizing the International 10-20 System of electrode placement.   A single channel EKG was also recorded.  Standard montages were used for review.    Background: The background activity was continuous and symmetric in drowsiness and sleep. It consisted of a complex mixture of frequencies appropriate for the age of the child.  Stage II sleep was characterized by symmetric vertex waves and synchronous sleep spindles    Slowing:  No focal slowing was noted.     Attenuation and asymmetry:  None.    Interictal Activity: None.    Activation Procedures:        EKG: No clear abnormalities were noted.    Impression: This is a normal EEG in the drowsy and asleep states.    Clinical Correlation:  A normal EEG does not rule out a seizure disorder. Clinical correlation is recommended.     Kari Pino MD  Attending, Pediatric Neurology and Epilepsy       20-Apr-2024 06:49

## 2024-04-20 NOTE — H&P ADULT - ATTENDING SUPERVISION STATEMENT
- Baseline sCr 2.5-3; at baseline now  - pRBC transfusion as above  - Avoid further nephrotoxic agents  - IV diuretics as above  - Recommend outpatient nephrology    Will need OP nephrology  He tells me he doesn't have one anymore  Refer to OP renal     Resident

## 2024-04-20 NOTE — ED PROVIDER NOTE - CLINICAL SUMMARY MEDICAL DECISION MAKING FREE TEXT BOX
Patient presenting for AMS oxygen stable patient ANO x 0 respond to tactile stimuli CT head negative lab work consistent with hypercarbia discussed case with Dr. Garber and will admit patient DNR/DNI inform patient's family of finding family endorsed they do not want intubation patient does not have mental status for BiPAP patient family aware of patient's finding and would likely deteriorate remains adamant they do not want aggressive  intervention

## 2024-04-20 NOTE — H&P ADULT - CONVERSATION DETAILS
MOLST form discussed and confirmed with health care proxy Sonya Ritchie [phone]. Patient DNR/DNI. Explained current illness and prognosis. Also explained that Ms Branch would not be able to tolerate non invasive ventilation due to reduced consciousness, confusion, and inability to clear oral secretions. Sonya understands and comprehends the information provided. She will think and discuss with family about future GOC including palliative. MOLST form discussed and confirmed with health care proxy Sonya Ritchie [phone]. Patient DNR/DNI. Explained current illness and prognosis. Also explained that Ms Branch would not be able to tolerate non invasive ventilation due to reduced consciousness, confusion, and inability to clear oral secretions. Sonya understands and comprehends the information provided including the patient's prognosis. Sonya agrees to transition Ms Branch care to comfort only. A MOLST form has been updated and placed in the chart. As per her best interest, patient is DNR/DNI with comfort measures only.

## 2024-04-20 NOTE — H&P ADULT - PROBLEM SELECTOR PLAN 2
Hx as above   Initial workup noted  Patient now comfort measures only  Ativan PRN  Rubinol PRN  Dilaudid PRN  Palliative consulted

## 2024-04-20 NOTE — ED ADULT TRIAGE NOTE - CHIEF COMPLAINT QUOTE
[facility] reports that  pt has change in mental status x2 days . pt  responding to  verbal and tactile stimuli

## 2024-04-20 NOTE — H&P ADULT - NSHPPHYSICALEXAM_GEN_ALL_CORE
PHYSICAL EXAMINATION:  GENERAL: NAD, appears confused and lethargic   HEAD:  Atraumatic, Normocephalic  ENT: Conjunctiva and sclera clear, pupils are equal, round, and reactive to light and accommodation, noted to have oral secretions   NECK: Supple, No JVD, trachea is midline, no evidence of thyroid enlargement, no lymphadenopathy or tenderness.  CHEST/LUNG: Decreased breath sounds and crackles bilaterally, minimal expiratory wheezing, no rubs  HEART: Regular rate and rhythm; No murmurs, rubs, or gallops  ABDOMEN: Soft, Nontender, Nondistended; Bowel sounds present  NERVOUS SYSTEM:  Awake & Oriented X1; follows some one step commands but not 2 step commands, does not engage on further neuro physical; Recent and remote memory is impaired  EXTREMITIES:  2+ Peripheral Pulses, No clubbing, cyanosis, or edema  SKIN: Warm, dry, and well perfused; Good turgor; No lesions, nodules or rashes are noted.     Vital Signs Last 24 Hrs  T(C): 36.5 (20 Apr 2024 16:47), Max: 36.9 (20 Apr 2024 06:06)  T(F): 97.7 (20 Apr 2024 16:47), Max: 98.5 (20 Apr 2024 06:06)  HR: 92 (20 Apr 2024 16:47) (82 - 92)  BP: 141/90 (20 Apr 2024 16:47) (112/74 - 141/90)  BP(mean): 82 (20 Apr 2024 16:16) (82 - 82)  RR: 16 (20 Apr 2024 16:47) (16 - 18)  SpO2: 97% (20 Apr 2024 16:47) (97% - 100%)    Parameters below as of 20 Apr 2024 16:47  Patient On (Oxygen Delivery Method): nasal cannula  O2 Flow (L/min): 3

## 2024-04-20 NOTE — H&P ADULT - HISTORY OF PRESENT ILLNESS
An 88 year old female from [facility], AAOX 1-2 at baseline, with PMH of AFib (on Eliquis), Arthritis, HTN, CVA, PFO, Brain Aneurysm, and Lung Cancer diagnosed five years ago, was brought into the ED due to AMS for the last 2 days. Patient AAOx1 at bedside, seems confused and unable to provide any information. However, denies dyspnea, chest pain, or palpitations. She was discharged from [facility] on 4/16/24 where she was managed for acute hypercarbic respiratory failure and septic shock secondary to UTI and concern of aspiration PNA. at that time she was treated with Zosyn w/ urine culture growing E. coli. She self extubated and passed SAT on 4/8. Discharged to [facility].    An 88 year old female from [facility], AAOX 1-2 at baseline, with PMH of AFib (on Eliquis), Arthritis, HTN, CVA, PFO, Brain Aneurysm, and Lung Cancer diagnosed five years ago, was brought into the ED due to AMS for the last 2 days. Patient AAOx1 at bedside, seems confused and unable to provide any information. However, denies dyspnea, chest pain, or palpitations. She was discharged from [facility] on 4/16/24 where she was managed for acute hypercarbic respiratory failure and septic shock secondary to UTI and concern of aspiration PNA. at that time she was treated with Zosyn w/ urine culture growing E. coli. She self extubated and passed SAT on 4/8. Discharged to [facility].

## 2024-04-21 NOTE — CHART NOTE - NSCHARTNOTEFT_GEN_A_CORE
Patient with lactate 2.7. Patient is comfort care only. No intervention needed at current time. Dr. Nation made aware

## 2024-04-21 NOTE — PROGRESS NOTE ADULT - SUBJECTIVE AND OBJECTIVE BOX
Date of Service 04-21-24 @ 12:09    CHIEF COMPLAINT:Patient is a 88y old  Female who presents with a chief complaint of Sepsis and Acute Hypoxic and Hypercapnic Respiratory .Pt awake and alert this am.  	  REVIEW OF SYSTEMS:  CONSTITUTIONAL: No fever, weight loss, or fatigue  EYES: No eye pain, visual disturbances, or discharge  ENT:  No difficulty hearing, tinnitus, vertigo; No sinus or throat pain  NECK: No pain or stiffness  RESPIRATORY: No cough, wheezing, chills or hemoptysis; No Shortness of Breath  CARDIOVASCULAR: No chest pain, palpitations, passing out, dizziness, or leg swelling  GASTROINTESTINAL: No abdominal or epigastric pain. No nausea, vomiting, or hematemesis; No diarrhea or constipation. No melena or hematochezia.  GENITOURINARY: No dysuria, frequency, hematuria, or incontinence  NEUROLOGICAL: No headaches, memory loss, loss of strength, numbness, or tremors  SKIN: No itching, burning, rashes, or lesions   LYMPH Nodes: No enlarged glands  ENDOCRINE: No heat or cold intolerance; No hair loss  MUSCULOSKELETAL: No joint pain or swelling; No muscle, back, or extremity pain  PSYCHIATRIC: No depression, anxiety, mood swings, or difficulty sleeping  HEME/LYMPH: No easy bruising, or bleeding gums  ALLERGY AND IMMUNOLOGIC: No hives or eczema	      PHYSICAL EXAM:  T(C): 36.3 (04-21-24 @ 05:18), Max: 36.6 (04-20-24 @ 16:16)  HR: 60 (04-21-24 @ 05:18) (60 - 92)  BP: 121/58 (04-21-24 @ 05:18) (110/52 - 141/90)  RR: 20 (04-21-24 @ 05:18) (16 - 20)  SpO2: 96% (04-21-24 @ 05:18) (96% - 99%)  Wt(kg): --  I&O's Summary      Appearance: Normal	  HEENT:   Normal oral mucosa, PERRL, EOMI	  Lymphatic: No lymphadenopathy  Cardiovascular: Normal S1 S2, No JVD, No murmurs, No edema  Respiratory: Lungs clear to auscultation	  Psychiatry: A & O x 3, Mood & affect appropriate  Gastrointestinal:  Soft, Non-tender, + BS	  Skin: No rashes, No ecchymoses, No cyanosis	  Neurologic: Non-focal  Extremities: Normal range of motion, No clubbing, cyanosis or edema  Vascular: Peripheral pulses palpable 2+ bilaterally    MEDICATIONS  (STANDING):  artificial  tears Solution 1 Drop(s) Both EYES two times a day  latanoprost 0.005% Ophthalmic Solution 1 Drop(s) Both EYES at bedtime      	  	  LABS:	 	                            11.1   13.59 )-----------( 663      ( 20 Apr 2024 07:45 )             36.7     04-20    147<H>  |  106  |  23<H>  ----------------------------<  89  5.1   |  40<H>  |  0.93    Ca    9.3      20 Apr 2024 07:45  Mg     2.4     04-20    TPro  7.9  /  Alb  2.3<L>  /  TBili  0.5  /  DBili  x   /  AST  22  /  ALT  10  /  AlkPhos  144<H>  04-20    proBNP:   Lipid Profile:   HgA1c:   TSH:

## 2024-04-22 NOTE — PROGRESS NOTE ADULT - PROBLEM SELECTOR PLAN 2
CT chest showed Moderate size bilateral pleural effusions and associated compressive   atelectasis superimposed over severe emphysema.  Patient now on 4L NC and tolerating.   GOC discussion during admission and patient was made DNR/DMI/CMO/[facility] exempt by HCP.   SW to follow regarding hospice.

## 2024-04-22 NOTE — PROGRESS NOTE ADULT - ASSESSMENT
88 year old female from [facility], AAOX 1-2 at baseline, with PMH of AFib (on Eliquis), Arthritis, HTN, CVA, PFO, Brain Aneurysm, and Lung Cancer diagnosed five years ago, was brought into the ED due to AMS for the last 2 days,acute on chronic hypercapnic respiratory failure.  1.Pt is DNR and DNI, HCP confirmed only want comfort care.  2.Await palliative eval for hospice.  3.Supportive care.  4.PO diet as tolerated.  
88 year old female from [facility], AAOX 1-2 at baseline, with PMH of AFib (on Eliquis), Arthritis, HTN, CVA, PFO, Brain Aneurysm, and Lung Cancer diagnosed five years ago, was brought into the ED due to AMS for the last 2 days,acute on chronic hypercapnic respiratory failure.  1.Pt is DNR and DNI, HCP confirmed only want comfort care.  2.Await palliative eval for hospice.  3.Supportive care.  4.PO diet as tolerated.  5.Above plan d/w HCP at bedside.
An 88 year old female from [facility], AAOX 1-2 at baseline, with PMH of AFib (on Eliquis), Arthritis, HTN, CVA, PFO, Brain Aneurysm, and Lung Cancer diagnosed five years ago, was brought into the ED due to AMS for the last 2 days. Patient AAOx1 at bedside. She was discharged from [facility] on 4/16/24 where she was managed for acute hypercarbic respiratory failure and septic shock secondary to UTI and concern of aspiration PNA. at that time she was treated with Zosyn w/ urine culture growing E. coli. She self extubated and passed SAT on 4/8. Discharged to [facility].   Patient this time was brought into the ED due to AMS for the last 2 days and acute on chronic hypercapnic respiratory failure. Family made patient DNR and DNI, HCP confirmed only want comfort care. SW to follow on hospice. Discussed with Palliative care and service signed off. MEWS exempt.

## 2024-04-22 NOTE — DISCHARGE NOTE FOR THE EXPIRED PATIENT - HOSPITAL COURSE
88 year old female from [facility], AAOX 1-2 at baseline, with PMH of AFib (on Eliquis), Arthritis, HTN, CVA, PFO, Brain Aneurysm, and Lung Cancer diagnosed five years ago, was brought into the ED due to AMS for the last 2 days. Patient AAOx1 at bedside, seems confused and unable to provide any information. However, denies dyspnea, chest pain, or palpitations. She was discharged from [facility] on 4/16/24 where she was managed for acute hypercarbic respiratory failure and septic shock secondary to UTI and concern of aspiration PNA. At that time, she was treated with Zosyn w/ urine culture growing E. coli. She self extubated and passed SAT on 4/8. Discharged to [facility].   MOLST form discussed and confirmed with health care proxy Sonya Ritchie [phone]. Patient DNR/DNI. Explained current illness and prognosis. Also explained that Ms Branch would not be able to tolerate non invasive ventilation due to reduced consciousness, confusion, and inability to clear oral secretions. Sonya understands and comprehends the information provided including the patient's prognosis. Sonya agrees to transition Ms Branch care to comfort only. A MOLST form was updated and placed in the chart. As per her best interest, patient is DNR/DNI with comfort measures only.

## 2024-04-22 NOTE — PROGRESS NOTE ADULT - PROBLEM SELECTOR PLAN 1
Admitted with AMS likely in the setting of hypercapnia vs infectious source.   UA + GNR  Bcx NGTD  Lactate elevated   Leukocytosis on admission   GOC discussion during admission and patient was made DNR/DMI/CMO/Mews exempt by HCP.   SW to follow regarding hospice.

## 2024-04-22 NOTE — PROGRESS NOTE ADULT - SUBJECTIVE AND OBJECTIVE BOX
NP note    HPI:  An 88 year old female from [facility], AAOX 1-2 at baseline, with PMH of AFib (on Eliquis), Arthritis, HTN, CVA, PFO, Brain Aneurysm, and Lung Cancer diagnosed five years ago, was brought into the ED due to AMS for the last 2 days. Patient AAOx1 at bedside, seems confused and unable to provide any information. However, denies dyspnea, chest pain, or palpitations. She was discharged from [facility] on 24 where she was managed for acute hypercarbic respiratory failure and septic shock secondary to UTI and concern of aspiration PNA. at that time she was treated with Zosyn w/ urine culture growing E. coli. She self extubated and passed SAT on . Discharged to [facility].  (2024 14:25)      Patient is a 88y old  Female who presents with a chief complaint of Sepsis and Acute Hypoxic and Hypercapnic Respiratory (2024 12:09)      INTERVAL HPI/OVERNIGHT EVENTS:  T(C): 36.3 (24 @ 05:22), Max: 36.8 (24 @ 21:28)  HR: 86 (24 @ 05:22) (86 - 98)  BP: 133/61 (24 @ 05:22) (91/58 - 158/85)  RR: 20 (24 @ 05:22) (20 - 20)  SpO2: 93% (24 @ 05:22) (93% - 94%)  Wt(kg): --  I&O's Summary    2024 07:01  -  2024 07:00  --------------------------------------------------------  IN: 0 mL / OUT: 200 mL / NET: -200 mL        REVIEW OF SYSTEMS: denies fever, chills, SOB, palpitations, chest pain, abdominal pain, nausea, vomitting, diarrhea, constipation, dizziness    MEDICATIONS  (STANDING):  artificial  tears Solution 1 Drop(s) Both EYES two times a day  latanoprost 0.005% Ophthalmic Solution 1 Drop(s) Both EYES at bedtime    MEDICATIONS  (PRN):  glycopyrrolate Injectable 0.4 milliGRAM(s) IV Push four times a day PRN Secretions  HYDROmorphone  Injectable 0.5 milliGRAM(s) IV Push every 2 hours PRN Moderate pain (4-6), Severe pain (7-10), Respiratory rate greater than 22  LORazepam   Injectable 0.5 milliGRAM(s) IV Push every 4 hours PRN Anxiety      PHYSICAL EXAM:  GENERAL: NAD, well-groomed, well-developed  HEAD:  Atraumatic, Normocephalic  EYES: EOMI, PERRLA, conjunctiva and sclera clear  ENMT: No tonsillar erythema, exudates, or enlargement; Moist mucous membranes, Good dentition, No lesions  NECK: Supple, No JVD, Normal thyroid  NERVOUS SYSTEM:  Alert & Oriented X3, Good concentration; Motor Strength 5/5 B/L upper and lower extremities; DTRs 2+ intact and symmetric  CHEST/LUNG: Clear to percussion bilaterally; No rales, rhonchi, wheezing, or rubs  HEART: Regular rate and rhythm; No murmurs, rubs, or gallops  ABDOMEN: Soft, Nontender, Nondistended; Bowel sounds present  EXTREMITIES:  2+ Peripheral Pulses, No clubbing, cyanosis, or edema  LYMPH: No lymphadenopathy noted  SKIN: No rashes or lesions  LABS:            Urinalysis Basic - ( 2024 20:45 )    Color: Dark Yellow / Appearance: Turbid / S.020 / pH: x  Gluc: x / Ketone: Trace mg/dL  / Bili: Negative / Urobili: 1.0 mg/dL   Blood: x / Protein: 30 mg/dL / Nitrite: Negative   Leuk Esterase: Moderate / RBC: 2 /HPF / WBC 6 /HPF   Sq Epi: x / Non Sq Epi: x / Bacteria: Many /HPF      CAPILLARY BLOOD GLUCOSE      POCT Blood Glucose.: 98 mg/dL (2024 07:50)  POCT Blood Glucose.: 91 mg/dL (2024 23:14)  POCT Blood Glucose.: 37 mg/dL (2024 23:12)  POCT Blood Glucose.: 67 mg/dL (2024 22:10)        Urinalysis Basic - ( 2024 20:45 )    Color: Dark Yellow / Appearance: Turbid / S.020 / pH: x  Gluc: x / Ketone: Trace mg/dL  / Bili: Negative / Urobili: 1.0 mg/dL   Blood: x / Protein: 30 mg/dL / Nitrite: Negative   Leuk Esterase: Moderate / RBC: 2 /HPF / WBC 6 /HPF   Sq Epi: x / Non Sq Epi: x / Bacteria: Many /HPF     NP note    HPI:  An 88 year old female from [facility], AAOX 1-2 at baseline, with PMH of AFib (on Eliquis), Arthritis, HTN, CVA, PFO, Brain Aneurysm, and Lung Cancer diagnosed five years ago, was brought into the ED due to AMS for the last 2 days. Patient AAOx1 at bedside, seems confused and unable to provide any information. However, denies dyspnea, chest pain, or palpitations. She was discharged from [facility] on 24 where she was managed for acute hypercarbic respiratory failure and septic shock secondary to UTI and concern of aspiration PNA. at that time she was treated with Zosyn w/ urine culture growing E. coli. She self extubated and passed SAT on . Discharged to [facility].  (2024 14:25)      Patient is a 88y old  Female who presents with a chief complaint of Sepsis and Acute Hypoxic and Hypercapnic Respiratory (2024 12:09)      INTERVAL HPI/OVERNIGHT EVENTS:  T(C): 36.3 (24 @ 05:22), Max: 36.8 (24 @ 21:28)  HR: 86 (24 @ 05:22) (86 - 98)  BP: 133/61 (24 @ 05:22) (91/58 - 158/85)  RR: 20 (24 @ 05:22) (20 - 20)  SpO2: 93% (24 @ 05:22) (93% - 94%)  Wt(kg): --  I&O's Summary    2024 07:01  -  2024 07:00  --------------------------------------------------------  IN: 0 mL / OUT: 200 mL / NET: -200 mL        REVIEW OF SYSTEMS: Unattainable 2/2 mentation     MEDICATIONS  (STANDING):  artificial  tears Solution 1 Drop(s) Both EYES two times a day  latanoprost 0.005% Ophthalmic Solution 1 Drop(s) Both EYES at bedtime    MEDICATIONS  (PRN):  glycopyrrolate Injectable 0.4 milliGRAM(s) IV Push four times a day PRN Secretions  HYDROmorphone  Injectable 0.5 milliGRAM(s) IV Push every 2 hours PRN Moderate pain (4-6), Severe pain (7-10), Respiratory rate greater than 22  LORazepam   Injectable 0.5 milliGRAM(s) IV Push every 4 hours PRN Anxiety      PHYSICAL EXAM:  GENERAL: NAD, well-groomed, well-developed  HEAD:  Atraumatic, Normocephalic  EYES: EOMI, PERRLA, conjunctiva and sclera clear  ENMT: No tonsillar erythema, exudates, or enlargement; Moist mucous membranes, Good dentition, No lesions  NECK: Supple, No JVD, Normal thyroid  NERVOUS SYSTEM:  Somnolent & Oriented to self. LROM of exts.   CHEST/LUNG: Diminished  bilaterally; No rales, rhonchi, wheezing, or rubs. On 3L   HEART: Regular rate and rhythm; No murmurs, rubs, or gallops  ABDOMEN: Soft, Nontender, Nondistended; Bowel sounds present  EXTREMITIES:  2+ Peripheral Pulses, No clubbing, cyanosis, or edema  SKIN: Exts cool to touch. No cyanosis     NO LABS.   CMO, DNR.DNI      Urinalysis Basic - ( 2024 20:45 )    Color: Dark Yellow / Appearance: Turbid / S.020 / pH: x  Gluc: x / Ketone: Trace mg/dL  / Bili: Negative / Urobili: 1.0 mg/dL   Blood: x / Protein: 30 mg/dL / Nitrite: Negative   Leuk Esterase: Moderate / RBC: 2 /HPF / WBC 6 /HPF   Sq Epi: x / Non Sq Epi: x / Bacteria: Many /HPF      CAPILLARY BLOOD GLUCOSE  POCT Blood Glucose.: 98 mg/dL (2024 07:50)  POCT Blood Glucose.: 91 mg/dL (2024 23:14)  POCT Blood Glucose.: 37 mg/dL (2024 23:12)  POCT Blood Glucose.: 67 mg/dL (2024 22:10)        Urinalysis Basic - ( 2024 20:45 )  Color: Dark Yellow / Appearance: Turbid / S.020 / pH: x  Gluc: x / Ketone: Trace mg/dL  / Bili: Negative / Urobili: 1.0 mg/dL   Blood: x / Protein: 30 mg/dL / Nitrite: Negative   Leuk Esterase: Moderate / RBC: 2 /HPF / WBC 6 /HPF   Sq Epi: x / Non Sq Epi: x / Bacteria: Many /HPF

## 2024-04-22 NOTE — PROGRESS NOTE ADULT - SUBJECTIVE AND OBJECTIVE BOX
Date of Service 04-22-24 @ 12:38    CHIEF COMPLAINT:Patient is a 88y old  Female who presents with a chief complaint of Sepsis and Acute Hypoxic and Hypercapnic Respiratory.Pt appears comfortable.    	  REVIEW OF SYSTEMS:  CONSTITUTIONAL: No fever, weight loss, or fatigue  EYES: No eye pain, visual disturbances, or discharge  ENT:  No difficulty hearing, tinnitus, vertigo; No sinus or throat pain  NECK: No pain or stiffness  RESPIRATORY: No cough, wheezing, chills or hemoptysis; No Shortness of Breath  CARDIOVASCULAR: No chest pain, palpitations, passing out, dizziness, or leg swelling  GASTROINTESTINAL: No abdominal or epigastric pain. No nausea, vomiting, or hematemesis; No diarrhea or constipation. No melena or hematochezia.  GENITOURINARY: No dysuria, frequency, hematuria, or incontinence  NEUROLOGICAL: No headaches, memory loss, loss of strength, numbness, or tremors  SKIN: No itching, burning, rashes, or lesions   LYMPH Nodes: No enlarged glands  ENDOCRINE: No heat or cold intolerance; No hair loss  MUSCULOSKELETAL: No joint pain or swelling; No muscle, back, or extremity pain  PSYCHIATRIC: No depression, anxiety, mood swings, or difficulty sleeping  HEME/LYMPH: No easy bruising, or bleeding gums  ALLERGY AND IMMUNOLOGIC: No hives or eczema	        PHYSICAL EXAM:  T(C): 36.3 (04-22-24 @ 05:22), Max: 36.8 (04-21-24 @ 21:28)  HR: 86 (04-22-24 @ 05:22) (86 - 98)  BP: 133/61 (04-22-24 @ 05:22) (91/58 - 158/85)  RR: 20 (04-22-24 @ 05:22) (20 - 20)  SpO2: 93% (04-22-24 @ 05:22) (93% - 94%)  Wt(kg): --  I&O's Summary    21 Apr 2024 07:01  -  22 Apr 2024 07:00  --------------------------------------------------------  IN: 0 mL / OUT: 200 mL / NET: -200 mL        Appearance: Normal	  HEENT:   Normal oral mucosa, PERRL, EOMI	  Lymphatic: No lymphadenopathy  Cardiovascular: Normal S1 S2, No JVD, No murmurs, No edema  Respiratory: Lungs clear to auscultation	  Psychiatry: A & O x 3, Mood & affect appropriate  Gastrointestinal:  Soft, Non-tender, + BS	  Skin: No rashes, No ecchymoses, No cyanosis	  Neurologic: Non-focal  Extremities: Normal range of motion, No clubbing, cyanosis or edema  Vascular: Peripheral pulses palpable 2+ bilaterally    MEDICATIONS  (STANDING):  artificial  tears Solution 1 Drop(s) Both EYES two times a day  latanoprost 0.005% Ophthalmic Solution 1 Drop(s) Both EYES at bedtime        	  LABS:	 	    none new

## 2024-04-22 NOTE — PROGRESS NOTE ADULT - PROBLEM SELECTOR PLAN 3
GOC discussion during admission and patient was made DNR/DMI/CMO/Mews exempt by HCP.   SW to follow regarding hospice.

## 2024-04-22 NOTE — DISCHARGE NOTE FOR THE EXPIRED PATIENT - OTHER SIGNIFICANT FINDINGS
I was called by the nurse to assess patient for unresponsiveness. On arrival to the room, patient was unresponsive, no spontaneous movements were observed. Patient did not respond to verbal or noxious stimuli. Absent heart and breath sounds. Pupils fixed and dilated. Patient was DNR/DNI, comfort measures only. Patient was pronounced dead at 10:15pm. Healthcare Proxy Sonya Ritchie made aware and notified. Attending Physician, Dr. Jaelyn Nation made aware. Organ donor notified.

## 2024-04-22 NOTE — PROGRESS NOTE ADULT - REASON FOR ADMISSION
Sepsis and Acute Hypoxic and Hypercapnic Respiratory

## 2024-04-22 NOTE — PROGRESS NOTE ADULT - PROBLEM SELECTOR PLAN 4
SW to follow on Hospice  Patient from [facility]  DNR/DNI/CMO  Mews Exempt   Palliative signed off  HCP updated.

## 2024-04-23 ENCOUNTER — TRANSCRIPTION ENCOUNTER (OUTPATIENT)
Age: 89
End: 2024-04-23

## 2024-04-24 LAB
-  AMIKACIN: SIGNIFICANT CHANGE UP
-  AZTREONAM: SIGNIFICANT CHANGE UP
-  CEFEPIME: SIGNIFICANT CHANGE UP
-  CEFTAZIDIME: SIGNIFICANT CHANGE UP
-  CIPROFLOXACIN: SIGNIFICANT CHANGE UP
-  IMIPENEM: SIGNIFICANT CHANGE UP
-  LEVOFLOXACIN: SIGNIFICANT CHANGE UP
-  MEROPENEM: SIGNIFICANT CHANGE UP
-  PIPERACILLIN/TAZOBACTAM: SIGNIFICANT CHANGE UP
METHOD TYPE: SIGNIFICANT CHANGE UP

## 2024-04-25 LAB
CULTURE RESULTS: ABNORMAL
CULTURE RESULTS: SIGNIFICANT CHANGE UP
CULTURE RESULTS: SIGNIFICANT CHANGE UP
ORGANISM # SPEC MICROSCOPIC CNT: ABNORMAL
SPECIMEN SOURCE: SIGNIFICANT CHANGE UP

## 2024-11-30 NOTE — SWALLOW BEDSIDE ASSESSMENT ADULT - SWALLOW EVAL: CRITERIA FOR SKILLED INTERVENTION MET
demonstrates skilled criteria for swallowing intervention
Not Applicable
demonstrates skilled criteria for swallowing intervention

## (undated) DEVICE — GOWN LG

## (undated) DEVICE — DRSG 2X2

## (undated) DEVICE — PACK IV START WITH CHG

## (undated) DEVICE — TUBING IV SET GRAVITY 1Y 78" MACRO

## (undated) DEVICE — TUBING MEDI-VAC W MAXIGRIP CONNECTORS 1/4"X6'

## (undated) DEVICE — TUBING ENDO EXT OLYMPUS 160 24HR USE GI

## (undated) DEVICE — CATH IV SAFE BC 22G X 1" (BLUE)

## (undated) DEVICE — LABELS BLANK W PEN

## (undated) DEVICE — VENODYNE/SCD SLEEVE CALF MEDIUM

## (undated) DEVICE — UNDERPAD LINEN SAVER 17 X 24"

## (undated) DEVICE — FORMALIN CONTAINER MED

## (undated) DEVICE — SOLIDIFIER 1200CC

## (undated) DEVICE — SYR LUER LOK 3CC

## (undated) DEVICE — DRSG CURITY GAUZE SPONGE 4 X 4" 12-PLY NON-STERILE

## (undated) DEVICE — WARMING BLANKET FULL ADULT

## (undated) DEVICE — ANESTHESIA CIRCUIT ADULT HMEF

## (undated) DEVICE — SOL INJ NS 0.9% 500ML 1-PORT

## (undated) DEVICE — Device

## (undated) DEVICE — SALIVA EJECTOR (BLUE)

## (undated) DEVICE — SOL IRR POUR NS 0.9% 500ML

## (undated) DEVICE — NDL HYPO SAFE 22G X 1" (BLACK)

## (undated) DEVICE — CONTAINER FORMALIN 10% 20ML

## (undated) DEVICE — BITE BLOCK ADULT 20 X 27MM (GREEN)

## (undated) DEVICE — DENTURE CUP PINK